# Patient Record
Sex: FEMALE | Race: BLACK OR AFRICAN AMERICAN | NOT HISPANIC OR LATINO | URBAN - METROPOLITAN AREA
[De-identification: names, ages, dates, MRNs, and addresses within clinical notes are randomized per-mention and may not be internally consistent; named-entity substitution may affect disease eponyms.]

---

## 2017-06-20 ENCOUNTER — INPATIENT (INPATIENT)
Facility: HOSPITAL | Age: 72
LOS: 4 days | Discharge: HOME | End: 2017-06-25
Attending: INTERNAL MEDICINE | Admitting: INTERNAL MEDICINE
Payer: MEDICARE

## 2017-06-20 VITALS
OXYGEN SATURATION: 90 % | RESPIRATION RATE: 17 BRPM | WEIGHT: 117.07 LBS | DIASTOLIC BLOOD PRESSURE: 61 MMHG | TEMPERATURE: 101 F | HEIGHT: 60 IN | HEART RATE: 94 BPM | SYSTOLIC BLOOD PRESSURE: 148 MMHG

## 2017-06-20 LAB
ALBUMIN SERPL ELPH-MCNC: 3.6 G/DL — SIGNIFICANT CHANGE UP (ref 3.3–5)
ALP SERPL-CCNC: 56 U/L — SIGNIFICANT CHANGE UP (ref 40–120)
ALT FLD-CCNC: 26 U/L — SIGNIFICANT CHANGE UP (ref 12–78)
ANION GAP SERPL CALC-SCNC: 12 MMOL/L — SIGNIFICANT CHANGE UP (ref 5–17)
APPEARANCE UR: ABNORMAL
AST SERPL-CCNC: 44 U/L — HIGH (ref 15–37)
BACTERIA # UR AUTO: ABNORMAL
BASOPHILS # BLD AUTO: 0.1 K/UL — SIGNIFICANT CHANGE UP (ref 0–0.2)
BASOPHILS NFR BLD AUTO: 1.2 % — SIGNIFICANT CHANGE UP (ref 0–2)
BILIRUB SERPL-MCNC: 1 MG/DL — SIGNIFICANT CHANGE UP (ref 0.2–1.2)
BILIRUB UR-MCNC: NEGATIVE — SIGNIFICANT CHANGE UP
BUN SERPL-MCNC: 14 MG/DL — SIGNIFICANT CHANGE UP (ref 7–23)
CALCIUM SERPL-MCNC: 8.9 MG/DL — SIGNIFICANT CHANGE UP (ref 8.5–10.1)
CHLORIDE SERPL-SCNC: 94 MMOL/L — LOW (ref 96–108)
CO2 SERPL-SCNC: 28 MMOL/L — SIGNIFICANT CHANGE UP (ref 22–31)
COLOR SPEC: YELLOW — SIGNIFICANT CHANGE UP
CREAT SERPL-MCNC: 0.98 MG/DL — SIGNIFICANT CHANGE UP (ref 0.5–1.3)
DIFF PNL FLD: ABNORMAL
EOSINOPHIL # BLD AUTO: 0 K/UL — SIGNIFICANT CHANGE UP (ref 0–0.5)
EOSINOPHIL NFR BLD AUTO: 0 % — SIGNIFICANT CHANGE UP (ref 0–6)
EPI CELLS # UR: SIGNIFICANT CHANGE UP
GLUCOSE SERPL-MCNC: 109 MG/DL — HIGH (ref 70–99)
GLUCOSE UR QL: NEGATIVE MG/DL — SIGNIFICANT CHANGE UP
HCT VFR BLD CALC: 36.7 % — SIGNIFICANT CHANGE UP (ref 34.5–45)
HGB BLD-MCNC: 12.5 G/DL — SIGNIFICANT CHANGE UP (ref 11.5–15.5)
KETONES UR-MCNC: ABNORMAL
LACTATE SERPL-SCNC: 1.6 MMOL/L — SIGNIFICANT CHANGE UP (ref 0.7–2)
LACTATE SERPL-SCNC: 3 MMOL/L — HIGH (ref 0.7–2)
LEUKOCYTE ESTERASE UR-ACNC: ABNORMAL
LYMPHOCYTES # BLD AUTO: 0.8 K/UL — LOW (ref 1–3.3)
LYMPHOCYTES # BLD AUTO: 10.5 % — LOW (ref 13–44)
MCHC RBC-ENTMCNC: 30.3 PG — SIGNIFICANT CHANGE UP (ref 27–34)
MCHC RBC-ENTMCNC: 34.2 GM/DL — SIGNIFICANT CHANGE UP (ref 32–36)
MCV RBC AUTO: 88.6 FL — SIGNIFICANT CHANGE UP (ref 80–100)
MONOCYTES # BLD AUTO: 0.9 K/UL — SIGNIFICANT CHANGE UP (ref 0–0.9)
MONOCYTES NFR BLD AUTO: 11.4 % — SIGNIFICANT CHANGE UP (ref 2–14)
NEUTROPHILS # BLD AUTO: 6.2 K/UL — SIGNIFICANT CHANGE UP (ref 1.8–7.4)
NEUTROPHILS NFR BLD AUTO: 76.9 % — SIGNIFICANT CHANGE UP (ref 43–77)
NITRITE UR-MCNC: NEGATIVE — SIGNIFICANT CHANGE UP
PH UR: 5 — SIGNIFICANT CHANGE UP (ref 5–8)
PLATELET # BLD AUTO: 225 K/UL — SIGNIFICANT CHANGE UP (ref 150–400)
POTASSIUM SERPL-MCNC: 3.2 MMOL/L — LOW (ref 3.5–5.3)
POTASSIUM SERPL-SCNC: 3.2 MMOL/L — LOW (ref 3.5–5.3)
PROT SERPL-MCNC: 7.9 GM/DL — SIGNIFICANT CHANGE UP (ref 6–8.3)
PROT UR-MCNC: 100 MG/DL
RBC # BLD: 4.14 M/UL — SIGNIFICANT CHANGE UP (ref 3.8–5.2)
RBC # FLD: 13.2 % — SIGNIFICANT CHANGE UP (ref 11–15)
RBC CASTS # UR COMP ASSIST: SIGNIFICANT CHANGE UP /HPF (ref 0–4)
SODIUM SERPL-SCNC: 134 MMOL/L — LOW (ref 135–145)
SP GR SPEC: 1.02 — SIGNIFICANT CHANGE UP (ref 1.01–1.02)
TROPONIN I SERPL-MCNC: 0.06 NG/ML — HIGH (ref 0.01–0.04)
UROBILINOGEN FLD QL: 1 MG/DL
WBC # BLD: 8 K/UL — SIGNIFICANT CHANGE UP (ref 3.8–10.5)
WBC # FLD AUTO: 8 K/UL — SIGNIFICANT CHANGE UP (ref 3.8–10.5)
WBC UR QL: SIGNIFICANT CHANGE UP

## 2017-06-20 PROCEDURE — 99285 EMERGENCY DEPT VISIT HI MDM: CPT

## 2017-06-20 PROCEDURE — 71010: CPT | Mod: 26

## 2017-06-20 RX ORDER — SODIUM CHLORIDE 9 MG/ML
500 INJECTION INTRAMUSCULAR; INTRAVENOUS; SUBCUTANEOUS ONCE
Qty: 0 | Refills: 0 | Status: COMPLETED | OUTPATIENT
Start: 2017-06-20 | End: 2017-06-20

## 2017-06-20 RX ORDER — IPRATROPIUM/ALBUTEROL SULFATE 18-103MCG
3 AEROSOL WITH ADAPTER (GRAM) INHALATION EVERY 6 HOURS
Qty: 0 | Refills: 0 | Status: DISCONTINUED | OUTPATIENT
Start: 2017-06-20 | End: 2017-06-25

## 2017-06-20 RX ORDER — CEFTRIAXONE 500 MG/1
1 INJECTION, POWDER, FOR SOLUTION INTRAMUSCULAR; INTRAVENOUS ONCE
Qty: 0 | Refills: 0 | Status: COMPLETED | OUTPATIENT
Start: 2017-06-20 | End: 2017-06-20

## 2017-06-20 RX ORDER — AZITHROMYCIN 500 MG/1
500 TABLET, FILM COATED ORAL ONCE
Qty: 0 | Refills: 0 | Status: COMPLETED | OUTPATIENT
Start: 2017-06-20 | End: 2017-06-20

## 2017-06-20 RX ORDER — POTASSIUM CHLORIDE 20 MEQ
10 PACKET (EA) ORAL
Qty: 0 | Refills: 0 | Status: COMPLETED | OUTPATIENT
Start: 2017-06-20 | End: 2017-06-20

## 2017-06-20 RX ORDER — ENOXAPARIN SODIUM 100 MG/ML
40 INJECTION SUBCUTANEOUS EVERY 24 HOURS
Qty: 0 | Refills: 0 | Status: DISCONTINUED | OUTPATIENT
Start: 2017-06-20 | End: 2017-06-25

## 2017-06-20 RX ORDER — ACETAMINOPHEN 500 MG
975 TABLET ORAL ONCE
Qty: 0 | Refills: 0 | Status: COMPLETED | OUTPATIENT
Start: 2017-06-20 | End: 2017-06-20

## 2017-06-20 RX ORDER — CEFTRIAXONE 500 MG/1
1 INJECTION, POWDER, FOR SOLUTION INTRAMUSCULAR; INTRAVENOUS EVERY 24 HOURS
Qty: 0 | Refills: 0 | Status: DISCONTINUED | OUTPATIENT
Start: 2017-06-21 | End: 2017-06-25

## 2017-06-20 RX ORDER — AZITHROMYCIN 500 MG/1
500 TABLET, FILM COATED ORAL EVERY 24 HOURS
Qty: 0 | Refills: 0 | Status: DISCONTINUED | OUTPATIENT
Start: 2017-06-20 | End: 2017-06-25

## 2017-06-20 RX ORDER — IPRATROPIUM/ALBUTEROL SULFATE 18-103MCG
3 AEROSOL WITH ADAPTER (GRAM) INHALATION ONCE
Qty: 0 | Refills: 0 | Status: COMPLETED | OUTPATIENT
Start: 2017-06-20 | End: 2017-06-20

## 2017-06-20 RX ORDER — CEFTRIAXONE 500 MG/1
INJECTION, POWDER, FOR SOLUTION INTRAMUSCULAR; INTRAVENOUS
Qty: 0 | Refills: 0 | Status: DISCONTINUED | OUTPATIENT
Start: 2017-06-20 | End: 2017-06-25

## 2017-06-20 RX ORDER — ASPIRIN/CALCIUM CARB/MAGNESIUM 324 MG
325 TABLET ORAL ONCE
Qty: 0 | Refills: 0 | Status: COMPLETED | OUTPATIENT
Start: 2017-06-20 | End: 2017-06-20

## 2017-06-20 RX ADMIN — Medication 100 MILLIEQUIVALENT(S): at 20:48

## 2017-06-20 RX ADMIN — Medication 100 MILLIEQUIVALENT(S): at 18:30

## 2017-06-20 RX ADMIN — ENOXAPARIN SODIUM 40 MILLIGRAM(S): 100 INJECTION SUBCUTANEOUS at 21:47

## 2017-06-20 RX ADMIN — Medication 3 MILLILITER(S): at 13:32

## 2017-06-20 RX ADMIN — Medication 325 MILLIGRAM(S): at 15:32

## 2017-06-20 RX ADMIN — Medication 975 MILLIGRAM(S): at 15:32

## 2017-06-20 RX ADMIN — AZITHROMYCIN 255 MILLIGRAM(S): 500 TABLET, FILM COATED ORAL at 16:09

## 2017-06-20 RX ADMIN — CEFTRIAXONE 100 GRAM(S): 500 INJECTION, POWDER, FOR SOLUTION INTRAMUSCULAR; INTRAVENOUS at 15:32

## 2017-06-20 RX ADMIN — Medication 3 MILLILITER(S): at 23:52

## 2017-06-20 RX ADMIN — SODIUM CHLORIDE 500 MILLILITER(S): 9 INJECTION INTRAMUSCULAR; INTRAVENOUS; SUBCUTANEOUS at 14:45

## 2017-06-20 RX ADMIN — SODIUM CHLORIDE 500 MILLILITER(S): 9 INJECTION INTRAMUSCULAR; INTRAVENOUS; SUBCUTANEOUS at 13:43

## 2017-06-20 RX ADMIN — Medication 100 MILLIEQUIVALENT(S): at 18:26

## 2017-06-20 NOTE — CONSULT NOTE ADULT - SUBJECTIVE AND OBJECTIVE BOX
Patient is a 72y old  Female who presents with a chief complaint of sob, cough.    HPI: 72 year female with Colon Cancer S/P Surgery with Colostomy in . Started with Cold about 4 days ago, Reports having productive cough, yellow Phlegm, poor appetite and SOB. Also reports having high fever up to 103 and poor appetite. At times it is difficult to bring up phlegm.  She is active smoker since age of 16.    PAST MEDICAL & SURGICAL HISTORY:  Rectal cancer  Port catheter in place: infusaport.  Chemotherapy follow-up examination: infusa port  S/P colostomy: for rectal ca. in 2/10/2012.  S/P colectomy: after colonoscopy in 10/4/2011.  S/P colonoscopy: .    FAMILY HISTORY: not contributory.    SOCIAL HISTORY: BMI (kg/m2): 22.9 . Active smoker since age 16.    Allergies  No Known Allergies    MEDICATIONS  (STANDING):  enoxaparin Injectable 40milliGRAM(s) Sub Cutaneous every 24 hours  potassium chloride  10 mEq/100 mL IVPB 10milliEquivalent(s) IV Intermittent every 1 hour    REVIEW OF SYSTEMS:    Constitutional:            fever, poor appeite  HEENT:                     No difficulty hearing, tinnitus, vertigo; No sinus or throat pain  Respiratory:               SOB and productive cough.  Cardiovascular:           No chest pain, palpitations  Gastrointestinal:        No abdominal or epigastric pain. No N/V/diarrhea or hematemesis  Genitourinary:            No dysuria, frequency, hematuria or incontinence  SKIN:                             no rash  Musculoskeletal:        No joint pain or swelling  Extremities:                No swelling  Neurological:              No headaches  Psychiatric:                 No depression, anxiety    Vital Signs Last 24 Hrs  T(C): 37, Max: 38.1 (- @ 12:24)  T(F): 98.6, Max: 100.5 (06-20 @ 12:24)  HR: 90 (90 - 94)  BP: 150/55 (148/61 - 150/55)  BP(mean): --  RR: 16 (16 - 17)  SpO2: 97% (90% - 97%)    PHYSICAL EXAM:  GEN:        Awake, responsive and comfortable.  HEENT:    Normal.    RESP:      no wheezing.  CVS:         Regular rate and rhythm.   ABD:         Colostomy  :             No costovertebral angle tenderness  SKIN:           Warm and dry.  EXTR:            No clubbing, cyanosis or edema  CNS:              Intact sensory and motor function.  PSYCH:        cooperative, no anxiety or depression    LABS:                        12.5   8.0   )-----------( 225      ( 2017 14:11 )             36.7     -    134<L>  |  94<L>  |  14  ----------------------------<  109<H>  3.2<L>   |  28  |  0.98    Ca    8.9      2017 14:11    TPro  7.9  /  Alb  3.6  /  TBili  1.0  /  DBili  x   /  AST  44<H>  /  ALT  26  /  AlkPhos  56          Urinalysis Basic - ( 2017 14:11 )    Color: Yellow / Appearance: Slightly Turbid / S.025 / pH: x  Gluc: x / Ketone: Small  / Bili: Negative / Urobili: 1 mg/dL   Blood: x / Protein: 100 mg/dL / Nitrite: Negative   Leuk Esterase: Moderate / RBC: 0-2 /HPF / WBC 3-5   Sq Epi: x / Non Sq Epi: Few / Bacteria: Few    EKG: sinus rhythm    RADIOLOGY & ADDITIONAL STUDIES:    EXAM:  CHEST SINGLE VIEW                            PROCEDURE DATE:  2017        INTERPRETATION:  cough    A frontal chest film demonstrates underlying interstitial lung disease   and chronic fibrotic change. No consolidation is seen.    The heart size and vascular markings are within normal limits for   technique.      No mediastinal or hilar adenopathy is suggested. .     The osseous structures appear intact intact.     IMPRESSION:  Interstitial lung disease throughout both lung fields.No consolidation   or pneumothorax.    RANDY RAO M.D., ATTENDING RADIOLOGIST  This document has been electronically signed. 2017  2:49PM      ASSESSMENT AND PLAN:  ·	SOB.  ·	Acute COPD exacerbation.  ·	Tobacco abuse.  ·	Suspect Pneumonia.  ·	Hypokalemia.  ·	Colon CA S/P Colostomy.    Admit to medical Floor.  IV antibiotics.  Nebulizer.  DVT prophylaxis.

## 2017-06-20 NOTE — ED PROVIDER NOTE - PRESENTING SYMPTOMS DETAILS
72F colorectal cancer s/p resection 2014 w a colostomy bag comes in w 4 days worrth of productive yellow cough, mild sob, fever. no cp, feels like she can't cough up the phlegm well. no n/v/d/abdpain  ROS: No fever/chills, No photophobia/eye pain/changes in vision, No ear pain/sore throat/dysphagia, No chest pain/palpitations, no wheeze/stridor, No abdominal pain/N/V/D, no dysuria/frequency/discharge, No neck/back pain, no rash, no changes in neurological status/function.

## 2017-06-20 NOTE — ED PROVIDER NOTE - CARE PLAN
Principal Discharge DX:	Pneumonia due to infectious organism, unspecified laterality, unspecified part of lung  Secondary Diagnosis:	Urinary tract infection without hematuria, site unspecified

## 2017-06-20 NOTE — ED ADULT NURSE NOTE - PSH
Chemotherapy follow-up examination  infusa port  Port catheter in place  infusaport.  S/P colectomy  after colonoscopy in 10/4/2011.  S/P colonoscopy  2011.  S/P colostomy  for rectal ca. in 2/10/2012.

## 2017-06-20 NOTE — ED ADULT NURSE REASSESSMENT NOTE - NS ED NURSE REASSESS COMMENT FT1
endorsed to [facility] next Krider given to her to send with pt awaiting transport
no c/o 2nd set orders pending
repeat lactate drawn and sent pt assisted to bedpan. colostomy minimal drainage no c/o

## 2017-06-20 NOTE — ED ADULT TRIAGE NOTE - CHIEF COMPLAINT QUOTE
SOB  , onset yesterday , cough , productive , yellowish and thick , fever 102  at home 2 hours ago, unsteady  gait

## 2017-06-20 NOTE — ED PROVIDER NOTE - PHYSICAL EXAMINATION
GEN: Alert, NAD  HEAD: atraumatic, EOMI, PERRL, normal lids/conjunctiva  ENT: normal hearing, patent oropharynx without erythema/exudate, uvula midline  NECK: +supple, no tenderness/meningismus, +Trachea midline  PULM: Bilateral BS, mild sob w mild rhonchi, no wheeze/stridor/retractions  CV: RRR, no M/R/G, +dist ext pulses  ABD: soft, NT/ND  BACK: no CVAT, no midline tenderness  MSK: no edema/erythema/cyanosis  SKIN: no rash  NEURO: AAOx3, no sensory/motor deficits, CN 2-12 intact

## 2017-06-21 DIAGNOSIS — J18.9 PNEUMONIA, UNSPECIFIED ORGANISM: ICD-10-CM

## 2017-06-21 DIAGNOSIS — C20 MALIGNANT NEOPLASM OF RECTUM: ICD-10-CM

## 2017-06-21 LAB
ALBUMIN SERPL ELPH-MCNC: 2.8 G/DL — LOW (ref 3.3–5)
ALP SERPL-CCNC: 43 U/L — SIGNIFICANT CHANGE UP (ref 40–120)
ALT FLD-CCNC: 20 U/L — SIGNIFICANT CHANGE UP (ref 12–78)
ANION GAP SERPL CALC-SCNC: 7 MMOL/L — SIGNIFICANT CHANGE UP (ref 5–17)
AST SERPL-CCNC: 43 U/L — HIGH (ref 15–37)
BASOPHILS # BLD AUTO: 0.1 K/UL — SIGNIFICANT CHANGE UP (ref 0–0.2)
BASOPHILS NFR BLD AUTO: 1.4 % — SIGNIFICANT CHANGE UP (ref 0–2)
BILIRUB SERPL-MCNC: 0.7 MG/DL — SIGNIFICANT CHANGE UP (ref 0.2–1.2)
BUN SERPL-MCNC: 8 MG/DL — SIGNIFICANT CHANGE UP (ref 7–23)
CALCIUM SERPL-MCNC: 8.6 MG/DL — SIGNIFICANT CHANGE UP (ref 8.5–10.1)
CHLORIDE SERPL-SCNC: 101 MMOL/L — SIGNIFICANT CHANGE UP (ref 96–108)
CO2 SERPL-SCNC: 30 MMOL/L — SIGNIFICANT CHANGE UP (ref 22–31)
CREAT SERPL-MCNC: 0.64 MG/DL — SIGNIFICANT CHANGE UP (ref 0.5–1.3)
CULTURE RESULTS: SIGNIFICANT CHANGE UP
EOSINOPHIL # BLD AUTO: 0 K/UL — SIGNIFICANT CHANGE UP (ref 0–0.5)
EOSINOPHIL NFR BLD AUTO: 0.1 % — SIGNIFICANT CHANGE UP (ref 0–6)
GLUCOSE SERPL-MCNC: 133 MG/DL — HIGH (ref 70–99)
HCT VFR BLD CALC: 31.6 % — LOW (ref 34.5–45)
HGB BLD-MCNC: 11 G/DL — LOW (ref 11.5–15.5)
LYMPHOCYTES # BLD AUTO: 0.5 K/UL — LOW (ref 1–3.3)
LYMPHOCYTES # BLD AUTO: 7 % — LOW (ref 13–44)
MAGNESIUM SERPL-MCNC: 1.9 MG/DL — SIGNIFICANT CHANGE UP (ref 1.6–2.6)
MCHC RBC-ENTMCNC: 30 PG — SIGNIFICANT CHANGE UP (ref 27–34)
MCHC RBC-ENTMCNC: 34.7 GM/DL — SIGNIFICANT CHANGE UP (ref 32–36)
MCV RBC AUTO: 86.7 FL — SIGNIFICANT CHANGE UP (ref 80–100)
MONOCYTES # BLD AUTO: 0.7 K/UL — SIGNIFICANT CHANGE UP (ref 0–0.9)
MONOCYTES NFR BLD AUTO: 10 % — SIGNIFICANT CHANGE UP (ref 2–14)
NEUTROPHILS # BLD AUTO: 5.7 K/UL — SIGNIFICANT CHANGE UP (ref 1.8–7.4)
NEUTROPHILS NFR BLD AUTO: 81.5 % — HIGH (ref 43–77)
PLATELET # BLD AUTO: 202 K/UL — SIGNIFICANT CHANGE UP (ref 150–400)
POTASSIUM SERPL-MCNC: 3.2 MMOL/L — LOW (ref 3.5–5.3)
POTASSIUM SERPL-SCNC: 3.2 MMOL/L — LOW (ref 3.5–5.3)
PROCALCITONIN SERPL-MCNC: 1.75 NG/ML — HIGH (ref 0–0.04)
PROT SERPL-MCNC: 6.5 GM/DL — SIGNIFICANT CHANGE UP (ref 6–8.3)
RBC # BLD: 3.65 M/UL — LOW (ref 3.8–5.2)
RBC # FLD: 13.7 % — SIGNIFICANT CHANGE UP (ref 11–15)
SODIUM SERPL-SCNC: 138 MMOL/L — SIGNIFICANT CHANGE UP (ref 135–145)
SPECIMEN SOURCE: SIGNIFICANT CHANGE UP
WBC # BLD: 7.1 K/UL — SIGNIFICANT CHANGE UP (ref 3.8–10.5)
WBC # FLD AUTO: 7.1 K/UL — SIGNIFICANT CHANGE UP (ref 3.8–10.5)

## 2017-06-21 RX ORDER — ACETAMINOPHEN 500 MG
650 TABLET ORAL EVERY 6 HOURS
Qty: 0 | Refills: 0 | Status: DISCONTINUED | OUTPATIENT
Start: 2017-06-21 | End: 2017-06-25

## 2017-06-21 RX ORDER — POTASSIUM CHLORIDE 20 MEQ
40 PACKET (EA) ORAL EVERY 4 HOURS
Qty: 0 | Refills: 0 | Status: COMPLETED | OUTPATIENT
Start: 2017-06-21 | End: 2017-06-21

## 2017-06-21 RX ADMIN — Medication 3 MILLILITER(S): at 05:50

## 2017-06-21 RX ADMIN — Medication 650 MILLIGRAM(S): at 01:00

## 2017-06-21 RX ADMIN — ENOXAPARIN SODIUM 40 MILLIGRAM(S): 100 INJECTION SUBCUTANEOUS at 22:09

## 2017-06-21 RX ADMIN — Medication 3 MILLILITER(S): at 17:11

## 2017-06-21 RX ADMIN — Medication 100 MILLIGRAM(S): at 18:33

## 2017-06-21 RX ADMIN — Medication 3 MILLILITER(S): at 23:53

## 2017-06-21 RX ADMIN — CEFTRIAXONE 100 GRAM(S): 500 INJECTION, POWDER, FOR SOLUTION INTRAMUSCULAR; INTRAVENOUS at 18:33

## 2017-06-21 RX ADMIN — AZITHROMYCIN 255 MILLIGRAM(S): 500 TABLET, FILM COATED ORAL at 16:02

## 2017-06-21 RX ADMIN — Medication 100 MILLIGRAM(S): at 12:51

## 2017-06-21 RX ADMIN — Medication 40 MILLIEQUIVALENT(S): at 18:32

## 2017-06-21 RX ADMIN — Medication 40 MILLIEQUIVALENT(S): at 12:57

## 2017-06-21 RX ADMIN — Medication 650 MILLIGRAM(S): at 12:54

## 2017-06-21 RX ADMIN — Medication 3 MILLILITER(S): at 11:01

## 2017-06-21 NOTE — H&P ADULT - HISTORY OF PRESENT ILLNESS
HPI: 72F colorectal cancer s/p resection 2014 w a colostomy bag comes in w 4 days worth of productive yellow cough, mild sob, fever. no cp, feels like she can't cough up the phlegm well. no n/v/d/abdpain.S/P chemotherapy for Ca colon.

## 2017-06-21 NOTE — H&P ADULT - ASSESSMENT
72F colorectal cancer s/p resection 2014 w a colostomy bag comes in w 4 days worth of productive yellow cough, mild sob, fever. no cp, feels like she can't cough up the phlegm well. no n/v/d/abdpain.S/P chemotherapy for Ca colon. Started on IV Rocephin and Azithromycin. Pulmonary consult noted.

## 2017-06-21 NOTE — H&P ADULT - NSHPREVIEWOFSYSTEMS_GEN_ALL_CORE
CONSTITUTIONAL: Mild fever, No weight loss, or fatigue  EYES: No eye pain, visual disturbances, or discharge  ENMT:  No difficulty hearing, tinnitus, vertigo; No sinus or throat pain  NECK: No pain or stiffness  BREASTS: No pain, masses, or nipple discharge  RESPIRATORY: Cough and wheezing, No chills or hemoptysis; Shortness of breath on exertion.  CARDIOVASCULAR: No chest pain, palpitations, dizziness, or leg swelling  GASTROINTESTINAL: No abdominal or epigastric pain. No nausea, vomiting, or hematemesis; No diarrhea or constipation. No melena or hematochezia.  GENITOURINARY: No dysuria, frequency, hematuria, or incontinence  NEUROLOGICAL: No headaches, memory loss, loss of strength, numbness, or tremors  SKIN: No itching, burning, rashes, or lesions   LYMPH NODES: No enlarged glands  ENDOCRINE: No heat or cold intolerance; No hair loss  MUSCULOSKELETAL: No joint pain or swelling; No muscle, back, or extremity pain  PSYCHIATRIC: No depression, anxiety, mood swings, or difficulty sleeping  HEME/LYMPH: No easy bruising, or bleeding gums  ALLERGY AND IMMUNOLOGIC: No hives or eczema

## 2017-06-21 NOTE — H&P ADULT - NSHPPHYSICALEXAM_GEN_ALL_CORE
GENERAL: NAD, well-groomed, Sparse built  HEAD:  Atraumatic, Normocephalic  EYES: EOMI, PERRLA, conjunctiva and sclera clear  ENMT:  Moist mucous membranes, Good dentition, No lesions  NECK: Supple, No JVD, Normal thyroid  NERVOUS SYSTEM:  Alert & Oriented X3, Good concentration; Motor Strength 5/5 B/L upper and lower extremities; DTRs 2+ intact and symmetric  CHEST/LUNG: Clear to percussion bilaterally; Bilateral Rales and rhonchi. Air flow equal bilaterally.  HEART: Regular rate and rhythm; No murmurs, rubs, or gallops  ABDOMEN: Soft, Nontender, Nondistended; Bowel sounds present. Colostomy in place, Functional.  EXTREMITIES:  2+ Peripheral Pulses, No clubbing, cyanosis, or edema  LYMPH: No lymphadenopathy noted  SKIN: No rashes or lesions

## 2017-06-22 LAB
GRAM STN FLD: SIGNIFICANT CHANGE UP
SPECIMEN SOURCE: SIGNIFICANT CHANGE UP

## 2017-06-22 PROCEDURE — 71250 CT THORAX DX C-: CPT | Mod: 26

## 2017-06-22 RX ADMIN — Medication 3 MILLILITER(S): at 18:51

## 2017-06-22 RX ADMIN — Medication 3 MILLILITER(S): at 23:16

## 2017-06-22 RX ADMIN — Medication 100 MILLIGRAM(S): at 21:53

## 2017-06-22 RX ADMIN — ENOXAPARIN SODIUM 40 MILLIGRAM(S): 100 INJECTION SUBCUTANEOUS at 21:53

## 2017-06-22 RX ADMIN — AZITHROMYCIN 255 MILLIGRAM(S): 500 TABLET, FILM COATED ORAL at 16:23

## 2017-06-22 RX ADMIN — CEFTRIAXONE 100 GRAM(S): 500 INJECTION, POWDER, FOR SOLUTION INTRAMUSCULAR; INTRAVENOUS at 17:49

## 2017-06-22 RX ADMIN — Medication 3 MILLILITER(S): at 12:18

## 2017-06-22 RX ADMIN — Medication 100 MILLIGRAM(S): at 06:19

## 2017-06-22 RX ADMIN — Medication 3 MILLILITER(S): at 05:43

## 2017-06-22 NOTE — PHYSICAL THERAPY INITIAL EVALUATION ADULT - MODIFIED CLINICAL TEST OF SENSORY INTEGRATION IN BALANCE TEST
Barthel Index: Feeding Score 10_/10__, Bathing Score 0_/5__, Grooming Score 5_/5__, Dressing Score 10_/10__, Bowels Score _10_/10_, Bladder Score 5_/10__, Toilet Score 10_/10__, Transfers Score 10_/15__, Mobility Score _0_/15_, Stairs Score 0_/10__,     Total Score __60_/100

## 2017-06-22 NOTE — PHYSICAL THERAPY INITIAL EVALUATION ADULT - TINETTI BALANCE TEST, REHAB EVAL
Sitting Balance - 1/1 , Rises From Chair - 1/2, Attempts to rise - 2/2 , Immediate Standing Balance - 1/2,  Standing Balance - 1/2, Nudged -  1/2, Eyes Closed -1 /1,  Turning 360 Deg -  1/2, Sitting down - 2/2, Balance Score - 11/16

## 2017-06-22 NOTE — PHYSICAL THERAPY INITIAL EVALUATION ADULT - IMPAIRMENTS FOUND, PT EVAL
aerobic capacity/endurance/posture/muscle strength/gait, locomotion, and balance/ergonomics and body mechanics

## 2017-06-22 NOTE — PHYSICAL THERAPY INITIAL EVALUATION ADULT - CRITERIA FOR SKILLED THERAPEUTIC INTERVENTIONS
anticipated equipment needs at discharge/impairments found/anticipated discharge recommendation/predicted duration of therapy intervention/functional limitations in following categories/therapy frequency/risk reduction/prevention

## 2017-06-22 NOTE — PHYSICAL THERAPY INITIAL EVALUATION ADULT - TINETTI GAIT TEST, REHAB EVAL
Indication of gait -1/1,   Step Length and height -2/2,   Foot Clearance -2/2,   Step Symmetry -1 /1,  Step Continuity -1/1,   Path -1/ 2,   Trunk -1/2,   Walking Time -0/1,   Total Score - 9/12

## 2017-06-23 LAB
ANION GAP SERPL CALC-SCNC: 7 MMOL/L — SIGNIFICANT CHANGE UP (ref 5–17)
BUN SERPL-MCNC: 4 MG/DL — LOW (ref 7–23)
CALCIUM SERPL-MCNC: 9 MG/DL — SIGNIFICANT CHANGE UP (ref 8.5–10.1)
CHLORIDE SERPL-SCNC: 100 MMOL/L — SIGNIFICANT CHANGE UP (ref 96–108)
CO2 SERPL-SCNC: 30 MMOL/L — SIGNIFICANT CHANGE UP (ref 22–31)
CREAT SERPL-MCNC: 0.44 MG/DL — LOW (ref 0.5–1.3)
GLUCOSE SERPL-MCNC: 103 MG/DL — HIGH (ref 70–99)
MAGNESIUM SERPL-MCNC: 1.9 MG/DL — SIGNIFICANT CHANGE UP (ref 1.6–2.6)
POTASSIUM SERPL-MCNC: 4.4 MMOL/L — SIGNIFICANT CHANGE UP (ref 3.5–5.3)
POTASSIUM SERPL-SCNC: 4.4 MMOL/L — SIGNIFICANT CHANGE UP (ref 3.5–5.3)
SODIUM SERPL-SCNC: 137 MMOL/L — SIGNIFICANT CHANGE UP (ref 135–145)

## 2017-06-23 RX ORDER — DIPHENHYDRAMINE HCL 50 MG
25 CAPSULE ORAL ONCE
Qty: 0 | Refills: 0 | Status: COMPLETED | OUTPATIENT
Start: 2017-06-23 | End: 2017-06-23

## 2017-06-23 RX ADMIN — Medication 100 MILLIGRAM(S): at 19:00

## 2017-06-23 RX ADMIN — Medication 3 MILLILITER(S): at 05:31

## 2017-06-23 RX ADMIN — Medication 3 MILLILITER(S): at 17:19

## 2017-06-23 RX ADMIN — ENOXAPARIN SODIUM 40 MILLIGRAM(S): 100 INJECTION SUBCUTANEOUS at 22:05

## 2017-06-23 RX ADMIN — Medication 25 MILLIGRAM(S): at 22:21

## 2017-06-23 RX ADMIN — AZITHROMYCIN 255 MILLIGRAM(S): 500 TABLET, FILM COATED ORAL at 16:33

## 2017-06-23 RX ADMIN — Medication 3 MILLILITER(S): at 11:56

## 2017-06-23 RX ADMIN — CEFTRIAXONE 100 GRAM(S): 500 INJECTION, POWDER, FOR SOLUTION INTRAMUSCULAR; INTRAVENOUS at 19:00

## 2017-06-24 LAB
CULTURE RESULTS: SIGNIFICANT CHANGE UP
GRAM STN FLD: SIGNIFICANT CHANGE UP
SPECIMEN SOURCE: SIGNIFICANT CHANGE UP

## 2017-06-24 RX ORDER — ZOLPIDEM TARTRATE 10 MG/1
5 TABLET ORAL AT BEDTIME
Qty: 0 | Refills: 0 | Status: DISCONTINUED | OUTPATIENT
Start: 2017-06-24 | End: 2017-06-25

## 2017-06-24 RX ORDER — IBUPROFEN 200 MG
400 TABLET ORAL EVERY 6 HOURS
Qty: 0 | Refills: 0 | Status: DISCONTINUED | OUTPATIENT
Start: 2017-06-24 | End: 2017-06-25

## 2017-06-24 RX ADMIN — AZITHROMYCIN 255 MILLIGRAM(S): 500 TABLET, FILM COATED ORAL at 16:25

## 2017-06-24 RX ADMIN — Medication 400 MILLIGRAM(S): at 19:49

## 2017-06-24 RX ADMIN — Medication 3 MILLILITER(S): at 17:11

## 2017-06-24 RX ADMIN — Medication 400 MILLIGRAM(S): at 19:06

## 2017-06-24 RX ADMIN — ZOLPIDEM TARTRATE 5 MILLIGRAM(S): 10 TABLET ORAL at 21:32

## 2017-06-24 RX ADMIN — Medication 3 MILLILITER(S): at 00:52

## 2017-06-24 RX ADMIN — Medication 3 MILLILITER(S): at 06:23

## 2017-06-24 RX ADMIN — Medication 3 MILLILITER(S): at 23:54

## 2017-06-24 RX ADMIN — CEFTRIAXONE 100 GRAM(S): 500 INJECTION, POWDER, FOR SOLUTION INTRAMUSCULAR; INTRAVENOUS at 17:32

## 2017-06-24 RX ADMIN — Medication 3 MILLILITER(S): at 11:19

## 2017-06-24 RX ADMIN — ENOXAPARIN SODIUM 40 MILLIGRAM(S): 100 INJECTION SUBCUTANEOUS at 21:32

## 2017-06-24 RX ADMIN — Medication 100 MILLIGRAM(S): at 17:36

## 2017-06-25 VITALS
SYSTOLIC BLOOD PRESSURE: 148 MMHG | TEMPERATURE: 97 F | OXYGEN SATURATION: 93 % | RESPIRATION RATE: 16 BRPM | DIASTOLIC BLOOD PRESSURE: 75 MMHG | HEART RATE: 80 BPM

## 2017-06-25 LAB
CULTURE RESULTS: SIGNIFICANT CHANGE UP
CULTURE RESULTS: SIGNIFICANT CHANGE UP
SPECIMEN SOURCE: SIGNIFICANT CHANGE UP
SPECIMEN SOURCE: SIGNIFICANT CHANGE UP

## 2017-06-25 RX ADMIN — Medication 400 MILLIGRAM(S): at 09:55

## 2017-06-25 RX ADMIN — Medication 400 MILLIGRAM(S): at 08:58

## 2017-06-25 RX ADMIN — Medication 100 MILLIGRAM(S): at 00:43

## 2017-06-25 NOTE — DISCHARGE NOTE ADULT - PATIENT PORTAL LINK FT
“You can access the [facility] Patient Portal, offered by [facility], by registering with the following website: [email or web address]”

## 2017-06-25 NOTE — DISCHARGE NOTE ADULT - CARE PROVIDER_API CALL
Karan Hannon (DO), Family Medicine  [address] [place] [address]  Phone: [phone]  Fax: [phone]    Lacho Montenegro (SONJA), Medicine  [address] [place] [address]  Phone: [phone]  Fax: [phone]

## 2017-06-25 NOTE — PROGRESS NOTE ADULT - PROBLEM SELECTOR PROBLEM 1
Pneumonia due to infectious organism, unspecified laterality, unspecified part of lung

## 2017-06-25 NOTE — PROGRESS NOTE ADULT - SUBJECTIVE AND OBJECTIVE BOX
INTERVAL HPI:    72 year female with Colon Cancer S/P Surgery with Colostomy in 2014. Started with Cold about 4 days ago, Reports having productive cough, yellow Phlegm, poor appetite and SOB. Also reports having high fever up to 103 and poor appetite. At times it is difficult to bring up phlegm.  She is active smoker since age of 16. Admitted with COPD exacerbation and suspected Pneumonia.    OVERNIGHT EVENTS:  Feels better, no distress.    Vital Signs Last 24 Hrs  T(C): 36.7, Max: 37.7 (06-23 @ 17:38)  T(F): 98, Max: 99.8 (06-23 @ 17:38)  HR: 82 (70 - 83)  BP: 133/63 (111/52 - 147/68)  BP(mean): --  RR: 17 (17 - 18)  SpO2: 98% (94% - 100%)    PHYSICAL EXAM:  GEN:         Awake, responsive and comfortable.  HEENT:    Normal.    RESP:      no wheezing.  CVS:         Regular rate and rhythm.   ABD:         Soft, non-tender, non-distended;   :             No costovertebral angle tenderness    MEDICATIONS  (STANDING):  enoxaparin Injectable 40milliGRAM(s) SubCutaneous every 24 hours  cefTRIAXone   IVPB  IV Intermittent   azithromycin  IVPB 500milliGRAM(s) IV Intermittent every 24 hours  cefTRIAXone   IVPB 1Gram(s) IV Intermittent every 24 hours  ALBUTerol/ipratropium for Nebulization 3milliLiter(s) Nebulizer every 6 hours    MEDICATIONS  (PRN):  acetaminophen   Tablet 650milliGRAM(s) Oral every 6 hours PRN For Temp greater than 38 C (100.4 F)  guaiFENesin    Syrup 100milliGRAM(s) Oral every 6 hours PRN Cough  zolpidem 5milliGRAM(s) Oral at bedtime PRN Insomnia    LABS:    06-23    137  |  100  |  4<L>  ----------------------------<  103<H>  4.4   |  30  |  0.44<L>    Ca    9.0      23 Jun 2017 07:40  Mg     1.9     06-23    ASSESSMENT AND PLAN:  ·	SOB.  ·	Multifocal Pneumonia.  ·	Acute COPD exacerbation.  ·	Tobacco abuse.  ·	Suspect Pneumonia.  ·	Hypokalemia.  ·	Colon CA S/P Colostomy.    Continue antibiotics and nebulizer.
INTERVAL HPI:   72 year female with Colon Cancer S/P Surgery with Colostomy in 2014. Started with Cold about 4 days ago, Reports having productive cough, yellow Phlegm, poor appetite and SOB. Also reports having high fever up to 103 and poor appetite. At times it is difficult to bring up phlegm.  She is active smoker since age of 16. Admitted with COPD exacerbation and suspected Pneumonia.    OVERNIGHT EVENTS:  comfortable and better.    Vital Signs Last 24 Hrs  T(C): 37.1, Max: 37.3 (06-22 @ 17:32)  T(F): 98.8, Max: 99.2 (06-22 @ 17:32)  HR: 83 (79 - 87)  BP: 130/60 (130/60 - 148/78)  BP(mean): --  RR: 19 (17 - 19)  SpO2: 98% (96% - 100%)    PHYSICAL EXAM:  GEN:         Awake, responsive and comfortable.  HEENT:    Normal.    RESP:       no wheezing.  CVS:             Regular rate and rhythm.   ABD:         Soft, non-tender, non-distended;   :             No costovertebral angle tenderness  EXTR:            No clubbing, cyanosis or edema  CNS:              Intact sensory and motor function.    MEDICATIONS  (STANDING):  enoxaparin Injectable 40milliGRAM(s) Sub Cutaneous every 24 hours  cefTRIAXone   IVPB  IV Intermittent   azithromycin  IVPB 500milliGRAM(s) IV Intermittent every 24 hours  cefTRIAXone   IVPB 1Gram(s) IV Intermittent every 24 hours  ALBUTerol/ipratropium for Nebulization 3milliLiter(s) Nebulizer every 6 hours    MEDICATIONS  (PRN):  acetaminophen   Tablet 650milliGRAM(s) Oral every 6 hours PRN For Temp greater than 38 C (100.4 F)  guaiFENesin    Syrup 100milliGRAM(s) Oral every 6 hours PRN Cough    LABS:    06-23    137  |  100  |  4<L>  ----------------------------<  103<H>  4.4   |  30  |  0.44<L>    Ca    9.0      23 Jun 2017 07:40  Mg     1.9     06-23    ASSESSMENT AND PLAN:  ·	SOB.  ·	Multifocal Pneumonia.  ·	Acute COPD exacerbation.  ·	Tobacco abuse.  ·	Suspect Pneumonia.  ·	Hypokalemia.  ·	Colon CA S/P Colostomy.    on antibiotics.  Follow up CT chest in 6-8 weeks.
INTERVAL HPI:  72 year female with Colon Cancer S/P Surgery with Colostomy in . Started with Cold about 4 days ago, Reports having productive cough, yellow Phlegm, poor appetite and SOB. Also reports having high fever up to 103 and poor appetite. At times it is difficult to bring up phlegm.  She is active smoker since age of 16. Admitted with COPD exacerbation and suspected Pneumonia.    OVERNIGHT EVENTS:  Awake, responsive, does not want to keep O2 on.    Vital Signs Last 24 Hrs  T(C): 38.7, Max: 39 ( @ 01:07)  T(F): 101.6, Max: 102.2 ( @ 01:07)  HR: 85 (74 - 96)  BP: 141/42 (117/48 - 165/63)  BP(mean): --  RR: 16 (16 - 18)  SpO2: 94% (93% - 98%)    PHYSICAL EXAM:  GEN:         Awake, responsive and comfortable.  HEENT:    Normal.    RESP:      no crackles.  CVS:          Regular rate and rhythm.   ABD:         Soft, non-tender, non-distended;     MEDICATIONS  (STANDING):  enoxaparin Injectable 40milliGRAM(s) SubCutaneous every 24 hours  cefTRIAXone   IVPB  IV Intermittent   azithromycin  IVPB 500milliGRAM(s) IV Intermittent every 24 hours  cefTRIAXone   IVPB 1Gram(s) IV Intermittent every 24 hours  ALBUTerol/ipratropium for Nebulization 3milliLiter(s) Nebulizer every 6 hours  potassium chloride    Tablet ER 40milliEquivalent(s) Oral every 4 hours    MEDICATIONS  (PRN):  acetaminophen   Tablet 650milliGRAM(s) Oral every 6 hours PRN For Temp greater than 38 C (100.4 F)  guaiFENesin    Syrup 100milliGRAM(s) Oral every 6 hours PRN Cough    LABS:                        11.0   7.1   )-----------( 202      ( 2017 08:30 )             31.6     -    138  |  101  |  8   ----------------------------<  133<H>  3.2<L>   |  30  |  0.64    Ca    8.6      2017 08:30  Mg     1.9         TPro  6.5  /  Alb  2.8<L>  /  TBili  0.7  /  DBili  x   /  AST  43<H>  /  ALT  20  /  AlkPhos  43  06-21    Urinalysis Basic - ( 2017 14:11 )    Color: Yellow / Appearance: Slightly Turbid / S.025 / pH: x  Gluc: x / Ketone: Small  / Bili: Negative / Urobili: 1 mg/dL   Blood: x / Protein: 100 mg/dL / Nitrite: Negative   Leuk Esterase: Moderate / RBC: 0-2 /HPF / WBC 3-5   Sq Epi: x / Non Sq Epi: Few / Bacteria: Few    ASSESSMENT AND PLAN:  ·	SOB.  ·	Acute COPD exacerbation.  ·	Tobacco abuse.  ·	Suspect Pneumonia.  ·	Hypokalemia.  ·	Colon CA S/P Colostomy.    Continue to have fever, will obtain CT chest.  Sent procalcitonin.  Continue nebulizer and antibiotics.
INTERVAL HPI: 72 year female with Colon Cancer S/P Surgery with Colostomy in 2014. Started with Cold about 4 days ago, Reports having productive cough, yellow Phlegm, poor appetite and SOB. Also reports having high fever up to 103 and poor appetite. At times it is difficult to bring up phlegm.  She is active smoker since age of 16. Admitted with COPD exacerbation and suspected Pneumonia.    OVERNIGHT EVENTS:  Feels better.    Vital Signs Last 24 Hrs  T(C): 37.3, Max: 37.7 (06-22 @ 00:57)  T(F): 99.2, Max: 99.8 (06-22 @ 00:57)  HR: 82 (79 - 88)  BP: 132/69 (129/53 - 148/56)  BP(mean): --  RR: 17 (16 - 17)  SpO2: 98% (93% - 100%)    PHYSICAL EXAM:  GEN:         Awake, responsive and comfortable.  HEENT:    Normal.    RESP:      no wheezing.  CVS:          Regular rate and rhythm.   ABD:         Soft, non-tender, non-distended;     MEDICATIONS  (STANDING):  enoxaparin Injectable 40milliGRAM(s) SubCutaneous every 24 hours  cefTRIAXone   IVPB  IV Intermittent   azithromycin  IVPB 500milliGRAM(s) IV Intermittent every 24 hours  cefTRIAXone   IVPB 1Gram(s) IV Intermittent every 24 hours  ALBUTerol/ipratropium for Nebulization 3milliLiter(s) Nebulizer every 6 hours    MEDICATIONS  (PRN):  acetaminophen   Tablet 650milliGRAM(s) Oral every 6 hours PRN For Temp greater than 38 C (100.4 F)  guaiFENesin    Syrup 100milliGRAM(s) Oral every 6 hours PRN Cough    LABS:                        11.0   7.1   )-----------( 202      ( 21 Jun 2017 08:30 )             31.6     06-21    138  |  101  |  8   ----------------------------<  133<H>  3.2<L>   |  30  |  0.64    Ca    8.6      21 Jun 2017 08:30  Mg     1.9     06-21    TPro  6.5  /  Alb  2.8<L>  /  TBili  0.7  /  DBili  x   /  AST  43<H>  /  ALT  20  /  AlkPhos  43  06-21      EXAM:  CT CHEST                            PROCEDURE DATE:  06/22/2017        INTERPRETATION:  CLINICAL INFORMATION: Pneumonia    COMPARISON: No prior CT studies are available for comparison.    PROCEDURE:     Serial axial sections through the chest were obtained without   administration of nonionic intravenous contrast. Sagittal, MIP and   coronal reformats were then generated from the axial images.    FINDINGS:     LUNG AND LARGE AIRWAYS: There are patchy and groundglass opacities   throughout both lungs.  PLEURA: No pleural effusion.  VESSELS: Marked atherosclerotic changes of the aorta and branching   vessels.  HEART: Normal size. Coronary artery calcifications are present. Trace   pericardial fluid, physiologic.  MEDIASTINUM AND LESLYE: A few subcentimeter mediastinal nodes are likely   reactive.  CHEST WALL AND LOWER NECK: Within normal limits.    UPPER ABDOMEN: Calcified gallstone is present. Marked atherosclerotic   changes of the abdominal aorta.    BONES: Within normal limits.    IMPRESSION:     Findings a metal multiple pneumonia.    Additional incidental findings as above.    CHAPITO ASHBY M.D., ATTENDING RADIOLOGIST  This document has been electronically signed. Jun 22 2017  9:20AM      ASSESSMENT AND PLAN:  ·	SOB.  ·	Multifocal Pneumonia.  ·	Acute COPD exacerbation.  ·	Tobacco abuse.  ·	Suspect Pneumonia.  ·	Hypokalemia.  ·	Colon CA S/P Colostomy.    Continue antibiotics and nebulizer.
Patient is a 72y old  Female who presents with a chief complaint of Cough, SOB (21 Jun 2017 10:16)      INTERVAL HPI/OVERNIGHT EVENTS: asymptomatic    MEDICATIONS  (STANDING):  enoxaparin Injectable 40milliGRAM(s) SubCutaneous every 24 hours  cefTRIAXone   IVPB  IV Intermittent   azithromycin  IVPB 500milliGRAM(s) IV Intermittent every 24 hours  cefTRIAXone   IVPB 1Gram(s) IV Intermittent every 24 hours  ALBUTerol/ipratropium for Nebulization 3milliLiter(s) Nebulizer every 6 hours    MEDICATIONS  (PRN):  acetaminophen   Tablet 650milliGRAM(s) Oral every 6 hours PRN For Temp greater than 38 C (100.4 F)  guaiFENesin    Syrup 100milliGRAM(s) Oral every 6 hours PRN Cough  zolpidem 5milliGRAM(s) Oral at bedtime PRN Insomnia      Allergies    No Known Allergies    Intolerances        REVIEW OF SYSTEMS:  CONSTITUTIONAL: No fever, weight loss, or fatigue  EYES: No eye pain, visual disturbances, or discharge  ENMT:  No difficulty hearing, tinnitus, vertigo; No sinus or throat pain  NECK: No pain or stiffness  BREASTS: No pain, masses, or nipple discharge  RESPIRATORY: No cough, wheezing, chills or hemoptysis; No shortness of breath  CARDIOVASCULAR: No chest pain, palpitations, dizziness, or leg swelling  GASTROINTESTINAL: No abdominal or epigastric pain. No nausea, vomiting, or hematemesis; No diarrhea or constipation. No melena or hematochezia.  GENITOURINARY: No dysuria, frequency, hematuria, or incontinence  NEUROLOGICAL: No headaches, memory loss, loss of strength, numbness, or tremors  SKIN: No itching, burning, rashes, or lesions   LYMPH NODES: No enlarged glands  ENDOCRINE: No heat or cold intolerance; No hair loss  MUSCULOSKELETAL: No joint pain or swelling; No muscle, back, or extremity pain  PSYCHIATRIC: No depression, anxiety, mood swings, or difficulty sleeping  HEME/LYMPH: No easy bruising, or bleeding gums  ALLERGY AND IMMUNOLOGIC: No hives or eczema    Vital Signs Last 24 Hrs  T(C): 36.7, Max: 37.7 (06-23 @ 17:38)  T(F): 98, Max: 99.8 (06-23 @ 17:38)  HR: 82 (70 - 83)  BP: 133/63 (111/52 - 147/68)  BP(mean): --  RR: 17 (17 - 18)  SpO2: 98% (97% - 100%)    PHYSICAL EXAM:  GENERAL: NAD, well-groomed, well-developed  HEAD:  Atraumatic, Normocephalic  EYES: EOMI, PERRLA, conjunctiva and sclera clear  ENMT:  Moist mucous membranes, Good dentition, No lesions  NECK: Supple, No JVD, Normal thyroid  NERVOUS SYSTEM:  Alert & Oriented X3, Good concentration; Motor Strength 5/5 B/L upper and lower extremities; DTRs 2+ intact and symmetric  CHEST/LUNG: Clear to percussion bilaterally; No rales, rhonchi, wheezing, or rubs  HEART: Regular rate and rhythm; No murmurs, rubs, or gallops  ABDOMEN: Soft, Nontender, Nondistended; Bowel sounds present  EXTREMITIES:  2+ Peripheral Pulses, No clubbing, cyanosis, or edema  LYMPH: No lymphadenopathy noted  SKIN: No rashes or lesions    LABS:    06-23    137  |  100  |  4<L>  ----------------------------<  103<H>  4.4   |  30  |  0.44<L>    Ca    9.0      23 Jun 2017 07:40  Mg     1.9     06-23          CAPILLARY BLOOD GLUCOSE          Procalcitonin, Serum: 1.75 ng/mL (06-21 @ 14:41)        Urine Culture:  06-22 @ 08:17    --      Normal Respiratory Charity present    .Sputum Sputum cup      Blood Culture:  06-22 @ 08:17    --      Normal Respiratory Charity present    .Sputum    RADIOLOGY & ADDITIONAL TESTS:    EXAM:  CT CHEST                            PROCEDURE DATE:  06/22/2017        INTERPRETATION:  CLINICAL INFORMATION: Pneumonia    COMPARISON: No prior CT studies are available for comparison.    PROCEDURE:     Serial axial sections through the chest were obtained without   administration of nonionic intravenous contrast. Sagittal, MIP and   coronal reformats were then generated from the axial images.    FINDINGS:     LUNG AND LARGE AIRWAYS: There are patchy and groundglass opacities   throughout both lungs.  PLEURA: No pleural effusion.  VESSELS: Marked atherosclerotic changes of the aorta and branching   vessels.  HEART: Normal size. Coronary artery calcifications are present. Trace   pericardial fluid, physiologic.  MEDIASTINUM AND LESLYE: A few subcentimeter mediastinal nodes are likely   reactive.  CHEST WALL AND LOWER NECK: Within normal limits.    UPPER ABDOMEN: Calcified gallstone is present. Marked atherosclerotic   changes of the abdominal aorta.    BONES: Within normal limits.    IMPRESSION:     Findings a metal multiple pneumonia.    Additional incidental findings as above.                  CHAPITO ASHBY M.D., ATTENDING RADIOLOGIST  This document has been electronically signed. Jun 22 2017  9:20AM              Imaging Personally Reviewed:  [x ] YES  [ ] NO    Consultant(s) Notes Reviewed:  [x ] YES  [ ] NO    Care Discussed with Consultants/Other Providers [ ] YES  [ ] NO    PROBLEMS:  UNSPECIFIED PNEUMONIA/ UTI WITHOUT HEMATURIA  WEAK COUGHING CHEST CONGESTION  Pneumonia due to infectious organism, unspecified laterality, unspecified part of lung  Rectal cancer  Pneumonia due to infectious organism, unspecified laterality, unspecified part of lung      Care discussed with family,         [  ]   yes  [  ]  No    imp:    stable[ ]    unstable[  ]     improving [   ]       unchanged  [  ]                Plans:  Continue present plans  [  ]               New consult [  ]   specialty  .......               order renetta[  ]    test name.                  Discharge Planning  [  ]
Patient is a 72y old  Female who presents with a chief complaint of Cough, SOB (21 Jun 2017 10:16)      INTERVAL HPI/OVERNIGHT EVENTS: asymptomatic    MEDICATIONS  (STANDING):  enoxaparin Injectable 40milliGRAM(s) SubCutaneous every 24 hours  cefTRIAXone   IVPB  IV Intermittent   azithromycin  IVPB 500milliGRAM(s) IV Intermittent every 24 hours  cefTRIAXone   IVPB 1Gram(s) IV Intermittent every 24 hours  ALBUTerol/ipratropium for Nebulization 3milliLiter(s) Nebulizer every 6 hours    MEDICATIONS  (PRN):  acetaminophen   Tablet 650milliGRAM(s) Oral every 6 hours PRN For Temp greater than 38 C (100.4 F)  guaiFENesin    Syrup 100milliGRAM(s) Oral every 6 hours PRN Cough  zolpidem 5milliGRAM(s) Oral at bedtime PRN Insomnia  ibuprofen  Tablet 400milliGRAM(s) Oral every 6 hours PRN pain      Allergies    No Known Allergies    Intolerances        REVIEW OF SYSTEMS:  CONSTITUTIONAL: No fever, weight loss, or fatigue  EYES: No eye pain, visual disturbances, or discharge  ENMT:  No difficulty hearing, tinnitus, vertigo; No sinus or throat pain  NECK: No pain or stiffness  BREASTS: No pain, masses, or nipple discharge  RESPIRATORY: No cough, wheezing, chills or hemoptysis; No shortness of breath  CARDIOVASCULAR: No chest pain, palpitations, dizziness, or leg swelling  GASTROINTESTINAL: No abdominal or epigastric pain. No nausea, vomiting, or hematemesis; No diarrhea or constipation. No melena or hematochezia.  GENITOURINARY: No dysuria, frequency, hematuria, or incontinence  NEUROLOGICAL: No headaches, memory loss, loss of strength, numbness, or tremors  SKIN: No itching, burning, rashes, or lesions   LYMPH NODES: No enlarged glands  ENDOCRINE: No heat or cold intolerance; No hair loss  MUSCULOSKELETAL: No joint pain or swelling; No muscle, back, or extremity pain  PSYCHIATRIC: No depression, anxiety, mood swings, or difficulty sleeping  HEME/LYMPH: No easy bruising, or bleeding gums  ALLERGY AND IMMUNOLOGIC: No hives or eczema    Vital Signs Last 24 Hrs  T(C): 36.6, Max: 37.7 (06-24 @ 17:36)  T(F): 97.8, Max: 99.8 (06-24 @ 17:36)  HR: 73 (73 - 87)  BP: 159/63 (133/63 - 168/74)  BP(mean): --  RR: 16 (16 - 18)  SpO2: 94% (90% - 98%)    PHYSICAL EXAM:  GENERAL: NAD, well-groomed, Sparse built  HEAD:  Atraumatic, Normocephalic  EYES: EOMI, PERRLA, conjunctiva and sclera clear  ENMT:  Moist mucous membranes, Good dentition, No lesions  NECK: Supple, No JVD, Normal thyroid  NERVOUS SYSTEM:  Alert & Oriented X3, Good concentration; Motor Strength 5/5 B/L upper and lower extremities; DTRs 2+ intact and symmetric  CHEST/LUNG: Clear to percussion bilaterally; No rales, rhonchi, wheezing, or rubs  HEART: Regular rate and rhythm; No murmurs, rubs, or gallops  ABDOMEN: Soft, Nontender, Nondistended; Bowel sounds present. Colostomy functional, No diarrheal stool.  EXTREMITIES:  2+ Peripheral Pulses, No clubbing, cyanosis, or edema  LYMPH: No lymphadenopathy noted  SKIN: No rashes or lesions    LABS:              CAPILLARY BLOOD GLUCOSE          Procalcitonin, Serum: 1.75 ng/mL (06-21 @ 14:41)        Urine Culture:  06-22 @ 08:17    --      Normal Respiratory Charity present    .Sputum Sputum cup      Blood Culture:  06-22 @ 08:17    --      Normal Respiratory Charity present    .Sputum Sputum cup      RADIOLOGY & ADDITIONAL TESTS:    EXAM:  CT CHEST                            PROCEDURE DATE:  06/22/2017        INTERPRETATION:  CLINICAL INFORMATION: Pneumonia    COMPARISON: No prior CT studies are available for comparison.    PROCEDURE:     Serial axial sections through the chest were obtained without   administration of nonionic intravenous contrast. Sagittal, MIP and   coronal reformats were then generated from the axial images.    FINDINGS:     LUNG AND LARGE AIRWAYS: There are patchy and groundglass opacities   throughout both lungs.  PLEURA: No pleural effusion.  VESSELS: Marked atherosclerotic changes of the aorta and branching   vessels.  HEART: Normal size. Coronary artery calcifications are present. Trace   pericardial fluid, physiologic.  MEDIASTINUM AND LESLYE: A few subcentimeter mediastinal nodes are likely   reactive.  CHEST WALL AND LOWER NECK: Within normal limits.    UPPER ABDOMEN: Calcified gallstone is present. Marked atherosclerotic   changes of the abdominal aorta.    BONES: Within normal limits.    IMPRESSION:     Findings a metal multiple pneumonia.    Additional incidental findings as above.      CHAPITO ASHBY M.D., ATTENDING RADIOLOGIST  This document has been electronically signed. Jun 22 2017  9:20AM      Imaging Personally Reviewed:  [x ] YES  [ ] NO    Consultant(s) Notes Reviewed:  [x ] YES  [ ] NO    Care Discussed with Consultants/Other Providers [x ] YES  [ ] NO    PROBLEMS:  UNSPECIFIED PNEUMONIA/ UTI WITHOUT HEMATURIA  WEAK COUGHING CHEST CONGESTION  Pneumonia due to infectious organism, unspecified laterality, unspecified part of lung  Rectal cancer  Pneumonia due to infectious organism, unspecified laterality, unspecified part of lung      Care discussed with family,         [  ]   yes  [  ]  No    imp:    stable[xx ]    unstable[  ]     improving [   ]       unchanged  [  ]                Plans:  Continue present plans  [  ]               New consult [  ]   specialty  .......               order renetta[  ]    test name.                  Discharge Planning  [xx  ]
Patient is a 72y old  Female who presents with a chief complaint of Cough, SOB (21 Jun 2017 10:16)      INTERVAL HPI/OVERNIGHT EVENTS: Pt. seen on 0/22/17 and 06/23/17    MEDICATIONS  (STANDING):  enoxaparin Injectable 40milliGRAM(s) SubCutaneous every 24 hours  cefTRIAXone   IVPB  IV Intermittent   azithromycin  IVPB 500milliGRAM(s) IV Intermittent every 24 hours  cefTRIAXone   IVPB 1Gram(s) IV Intermittent every 24 hours  ALBUTerol/ipratropium for Nebulization 3milliLiter(s) Nebulizer every 6 hours    MEDICATIONS  (PRN):  acetaminophen   Tablet 650milliGRAM(s) Oral every 6 hours PRN For Temp greater than 38 C (100.4 F)  guaiFENesin    Syrup 100milliGRAM(s) Oral every 6 hours PRN Cough      Allergies    No Known Allergies    Intolerances        REVIEW OF SYSTEMS:  CONSTITUTIONAL: No fever, weight loss, or fatigue  EYES: No eye pain, visual disturbances, or discharge  ENMT:  No difficulty hearing, tinnitus, vertigo; No sinus or throat pain  NECK: No pain or stiffness  BREASTS: No pain, masses, or nipple discharge  RESPIRATORY: Mild cough, wheezing, chills or hemoptysis; No shortness of breath  CARDIOVASCULAR: No chest pain, palpitations, dizziness, or leg swelling  GASTROINTESTINAL: No abdominal or epigastric pain. No nausea, vomiting, or hematemesis; No diarrhea or constipation. No melena or hematochezia.  GENITOURINARY: No dysuria, frequency, hematuria, or incontinence  NEUROLOGICAL: No headaches, memory loss, loss of strength, numbness, or tremors  SKIN: No itching, burning, rashes, or lesions   LYMPH NODES: No enlarged glands  ENDOCRINE: No heat or cold intolerance; No hair loss  MUSCULOSKELETAL: No joint pain or swelling; No muscle, back, or extremity pain  PSYCHIATRIC: No depression, anxiety, mood swings, or difficulty sleeping  HEME/LYMPH: No easy bruising, or bleeding gums  ALLERGY AND IMMUNOLOGIC: No hives or eczema    Vital Signs Last 24 Hrs  T(C): 37.1, Max: 37.3 (06-22 @ 17:32)  T(F): 98.8, Max: 99.2 (06-22 @ 17:32)  HR: 83 (79 - 87)  BP: 130/60 (130/60 - 148/78)  BP(mean): --  RR: 19 (17 - 19)  SpO2: 98% (96% - 100%)    PHYSICAL EXAM:  GENERAL: NAD, well-groomed, well-developed  HEAD:  Atraumatic, Normocephalic  EYES: EOMI, PERRLA, conjunctiva and sclera clear  ENMT:  Moist mucous membranes, Good dentition, No lesions  NECK: Supple, No JVD, Normal thyroid  NERVOUS SYSTEM:  Alert & Oriented X3, Good concentration; Motor Strength 5/5 B/L upper and lower extremities; DTRs 2+ intact and symmetric  CHEST/LUNG: Clear to percussion bilaterally; Rales left, rhonchi, wheezing, or rubs  HEART: Regular rate and rhythm; No murmurs, rubs, or gallops  ABDOMEN: Soft, Nontender, Nondistended; Bowel sounds present. Colostomy functional.  EXTREMITIES:  2+ Peripheral Pulses, No clubbing, cyanosis, or edema  LYMPH: No lymphadenopathy noted  SKIN: No rashes or lesions    LABS:    06-23    137  |  100  |  4<L>  ----------------------------<  103<H>  4.4   |  30  |  0.44<L>    Ca    9.0      23 Jun 2017 07:40  Mg     1.9     06-23          CAPILLARY BLOOD GLUCOSE          Procalcitonin, Serum: 1.75 ng/mL (06-21 @ 14:41)        Urine Culture:  06-22 @ 08:17    --      Normal Respiratory Charity present    .Sputum Sputum cup      Blood Culture:  06-22 @ 08:17    --      Normal Respiratory Charity present    .Sputum Sputum cup      RADIOLOGY & ADDITIONAL TESTS:    Imaging Personally Reviewed:  [ ] YES  [ ] NO    Consultant(s) Notes Reviewed:  [ ] YES  [ ] NO    Care Discussed with Consultants/Other Providers [ ] YES  [ ] NO    PROBLEMS:  UNSPECIFIED PNEUMONIA/ UTI WITHOUT HEMATURIA  WEAK COUGHING CHEST CONGESTION  Pneumonia due to infectious organism, unspecified laterality, unspecified part of lung  Rectal cancer  Pneumonia due to infectious organism, unspecified laterality, unspecified part of lung      Care discussed with family,         [  ]   yes  [  ]  No    imp:    stable[ ]    unstable[  ]     improving [   ]       unchanged  [  ]                Plans:  Continue present plans  [  ]               New consult [  ]   specialty  .......               order renetta[  ]    test name.                  Discharge Planning  [  ]

## 2017-06-25 NOTE — DISCHARGE NOTE ADULT - MEDICATION SUMMARY - MEDICATIONS TO TAKE
I will START or STAY ON the medications listed below when I get home from the hospital:    amoxicillin-clavulanate 875 mg-125 mg oral tablet  -- 1 tab(s) by mouth every 12 hours  -- Indication: For Resolving Pneumonia

## 2017-06-25 NOTE — DISCHARGE NOTE ADULT - CARE PLAN
Principal Discharge DX:	Pneumonia due to infectious organism, unspecified laterality, unspecified part of lung  Goal:	Resolved. F/U with PMD and Pulmonary. Repeat CAT scan Chest in 3-4 weeks.  Instructions for follow-up, activity and diet:	Augmentin x 7 days  Secondary Diagnosis:	COPD exacerbation  Goal:	Resolved  Secondary Diagnosis:	S/P colectomy  Secondary Diagnosis:	S/P colostomy  Secondary Diagnosis:	Rectal cancer

## 2017-06-25 NOTE — DISCHARGE NOTE ADULT - PLAN OF CARE
Resolved. F/U with PMD and Pulmonary. Repeat CAT scan Chest in 3-4 weeks. Resolved Augmentin x 7 days

## 2017-06-25 NOTE — PROGRESS NOTE ADULT - ASSESSMENT
72F colorectal cancer s/p resection 2014 w a colostomy bag comes in w 4 days worth of productive yellow cough, mild sob, fever. no cp, feels like she can't cough up the phlegm well. no n/v/d/abdpain.S/P chemotherapy for Ca colon. Started on IV Rocephin and Azithromycin. Pulmonary consult noted. Continue iv Antibiotics. Clinically much improved. Pulmonary f/u noted. Colostomy has liquid stool.
72F colorectal cancer s/p resection 2014 w a colostomy bag comes in w 4 days worth of productive yellow cough, mild sob, fever. no cp, feels like she can't cough up the phlegm well. no n/v/d/abdpain.S/P chemotherapy for Ca colon. Started on IV Rocephin and Azithromycin. Pulmonary consult noted. Continue iv Antibiotics. Clinically much improved. Pulmonary f/u noted. Colostomy has liquid stool. Continue IV antibiotics.
72F colorectal cancer s/p resection 2014 w a colostomy bag comes in w 4 days worth of productive yellow cough, mild sob, fever. no cp, feels like she can't cough up the phlegm well. no n/v/d/abdpain.S/P chemotherapy for Ca colon. Started on IV Rocephin and Azithromycin. Pulmonary consult noted. Continue iv Antibiotics. Clinically much improved. Pulmonary f/u noted. Colostomy has liquid stool. Continue IV antibiotics.Pt. asynptomatic, Ambulating well. No cough or SOB. Plan: discharge home on Augmentin, F/U with PMD. Advise repeat CAT scan In 3-4 weeks.

## 2017-06-25 NOTE — DISCHARGE NOTE ADULT - HOSPITAL COURSE
72F colorectal cancer s/p resection 2014 w a colostomy bag comes in w 4 days worth of productive yellow cough, mild sob, fever. no cp, feels like she can't cough up the phlegm well. no n/v/d/abdpain.S/P chemotherapy for Ca colon. Started on IV Rocephin and Azithromycin. Pulmonary consult noted. Continue iv Antibiotics. Clinically much improved. Pulmonary f/u noted. Colostomy has liquid stool. Continue IV antibiotics.Pt. asynptomatic, Ambulating well. No cough or SOB. Plan: discharge home on Augmentin, F/U with PMD. Advise repeat CAT scan In 3-4 weeks. Discussed with the daughter.

## 2017-06-28 DIAGNOSIS — F17.200 NICOTINE DEPENDENCE, UNSPECIFIED, UNCOMPLICATED: ICD-10-CM

## 2017-06-28 DIAGNOSIS — Z92.21 PERSONAL HISTORY OF ANTINEOPLASTIC CHEMOTHERAPY: ICD-10-CM

## 2017-06-28 DIAGNOSIS — J44.0 CHRONIC OBSTRUCTIVE PULMONARY DISEASE WITH ACUTE LOWER RESPIRATORY INFECTION: ICD-10-CM

## 2017-06-28 DIAGNOSIS — E87.6 HYPOKALEMIA: ICD-10-CM

## 2017-06-28 DIAGNOSIS — J18.9 PNEUMONIA, UNSPECIFIED ORGANISM: ICD-10-CM

## 2017-06-28 DIAGNOSIS — Z85.038 PERSONAL HISTORY OF OTHER MALIGNANT NEOPLASM OF LARGE INTESTINE: ICD-10-CM

## 2017-06-28 DIAGNOSIS — J44.1 CHRONIC OBSTRUCTIVE PULMONARY DISEASE WITH (ACUTE) EXACERBATION: ICD-10-CM

## 2017-06-28 DIAGNOSIS — Z93.3 COLOSTOMY STATUS: ICD-10-CM

## 2017-06-28 DIAGNOSIS — Z90.49 ACQUIRED ABSENCE OF OTHER SPECIFIED PARTS OF DIGESTIVE TRACT: ICD-10-CM

## 2017-06-28 DIAGNOSIS — N39.0 URINARY TRACT INFECTION, SITE NOT SPECIFIED: ICD-10-CM

## 2019-01-28 ENCOUNTER — OUTPATIENT (OUTPATIENT)
Dept: OUTPATIENT SERVICES | Facility: HOSPITAL | Age: 74
LOS: 1 days | Discharge: HOME | End: 2019-01-28
Payer: MEDICARE

## 2019-01-28 VITALS
RESPIRATION RATE: 18 BRPM | WEIGHT: 130.07 LBS | TEMPERATURE: 97 F | OXYGEN SATURATION: 99 % | SYSTOLIC BLOOD PRESSURE: 161 MMHG | HEIGHT: 63 IN | HEART RATE: 70 BPM | DIASTOLIC BLOOD PRESSURE: 88 MMHG

## 2019-01-28 VITALS
RESPIRATION RATE: 18 BRPM | DIASTOLIC BLOOD PRESSURE: 102 MMHG | WEIGHT: 130.07 LBS | TEMPERATURE: 98 F | HEIGHT: 63 IN | OXYGEN SATURATION: 99 % | HEART RATE: 70 BPM | SYSTOLIC BLOOD PRESSURE: 165 MMHG

## 2019-01-28 DIAGNOSIS — R03.0 ELEVATED BLOOD-PRESSURE READING, WITHOUT DIAGNOSIS OF HYPERTENSION: ICD-10-CM

## 2019-01-28 DIAGNOSIS — Z01.812 ENCOUNTER FOR PREPROCEDURAL LABORATORY EXAMINATION: ICD-10-CM

## 2019-01-28 DIAGNOSIS — B02.23 POSTHERPETIC POLYNEUROPATHY: ICD-10-CM

## 2019-01-28 DIAGNOSIS — K63.5 POLYP OF COLON: ICD-10-CM

## 2019-01-28 DIAGNOSIS — Z01.818 ENCOUNTER FOR OTHER PREPROCEDURAL EXAMINATION: ICD-10-CM

## 2019-01-28 LAB
ALBUMIN SERPL ELPH-MCNC: 4 G/DL — SIGNIFICANT CHANGE UP (ref 3.3–5)
ALP SERPL-CCNC: 72 U/L — SIGNIFICANT CHANGE UP (ref 40–120)
ALT FLD-CCNC: 16 U/L — SIGNIFICANT CHANGE UP (ref 12–78)
ANION GAP SERPL CALC-SCNC: 6 MMOL/L — SIGNIFICANT CHANGE UP (ref 5–17)
AST SERPL-CCNC: 15 U/L — SIGNIFICANT CHANGE UP (ref 15–37)
BASOPHILS # BLD AUTO: 0.06 K/UL — SIGNIFICANT CHANGE UP (ref 0–0.2)
BASOPHILS NFR BLD AUTO: 1.3 % — SIGNIFICANT CHANGE UP (ref 0–2)
BILIRUB SERPL-MCNC: 0.3 MG/DL — SIGNIFICANT CHANGE UP (ref 0.2–1.2)
BUN SERPL-MCNC: 24 MG/DL — HIGH (ref 7–23)
CALCIUM SERPL-MCNC: 9.5 MG/DL — SIGNIFICANT CHANGE UP (ref 8.5–10.1)
CHLORIDE SERPL-SCNC: 106 MMOL/L — SIGNIFICANT CHANGE UP (ref 96–108)
CO2 SERPL-SCNC: 29 MMOL/L — SIGNIFICANT CHANGE UP (ref 22–31)
CREAT SERPL-MCNC: 1.01 MG/DL — SIGNIFICANT CHANGE UP (ref 0.5–1.3)
EOSINOPHIL # BLD AUTO: 0.11 K/UL — SIGNIFICANT CHANGE UP (ref 0–0.5)
EOSINOPHIL NFR BLD AUTO: 2.4 % — SIGNIFICANT CHANGE UP (ref 0–6)
GLUCOSE SERPL-MCNC: 97 MG/DL — SIGNIFICANT CHANGE UP (ref 70–99)
HCT VFR BLD CALC: 34.1 % — LOW (ref 34.5–45)
HGB BLD-MCNC: 10.7 G/DL — LOW (ref 11.5–15.5)
IMM GRANULOCYTES NFR BLD AUTO: 0.2 % — SIGNIFICANT CHANGE UP (ref 0–1.5)
LYMPHOCYTES # BLD AUTO: 1.43 K/UL — SIGNIFICANT CHANGE UP (ref 1–3.3)
LYMPHOCYTES # BLD AUTO: 31.1 % — SIGNIFICANT CHANGE UP (ref 13–44)
MCHC RBC-ENTMCNC: 28.9 PG — SIGNIFICANT CHANGE UP (ref 27–34)
MCHC RBC-ENTMCNC: 31.4 GM/DL — LOW (ref 32–36)
MCV RBC AUTO: 92.2 FL — SIGNIFICANT CHANGE UP (ref 80–100)
MONOCYTES # BLD AUTO: 0.6 K/UL — SIGNIFICANT CHANGE UP (ref 0–0.9)
MONOCYTES NFR BLD AUTO: 13 % — SIGNIFICANT CHANGE UP (ref 2–14)
NEUTROPHILS # BLD AUTO: 2.39 K/UL — SIGNIFICANT CHANGE UP (ref 1.8–7.4)
NEUTROPHILS NFR BLD AUTO: 52 % — SIGNIFICANT CHANGE UP (ref 43–77)
NRBC # BLD: 0 /100 WBCS — SIGNIFICANT CHANGE UP (ref 0–0)
PLATELET # BLD AUTO: 281 K/UL — SIGNIFICANT CHANGE UP (ref 150–400)
POTASSIUM SERPL-MCNC: 4.3 MMOL/L — SIGNIFICANT CHANGE UP (ref 3.5–5.3)
POTASSIUM SERPL-SCNC: 4.3 MMOL/L — SIGNIFICANT CHANGE UP (ref 3.5–5.3)
PROT SERPL-MCNC: 8.2 GM/DL — SIGNIFICANT CHANGE UP (ref 6–8.3)
RBC # BLD: 3.7 M/UL — LOW (ref 3.8–5.2)
RBC # FLD: 14.5 % — SIGNIFICANT CHANGE UP (ref 10.3–14.5)
SODIUM SERPL-SCNC: 141 MMOL/L — SIGNIFICANT CHANGE UP (ref 135–145)
WBC # BLD: 4.6 K/UL — SIGNIFICANT CHANGE UP (ref 3.8–10.5)
WBC # FLD AUTO: 4.6 K/UL — SIGNIFICANT CHANGE UP (ref 3.8–10.5)

## 2019-01-28 PROCEDURE — 93010 ELECTROCARDIOGRAM REPORT: CPT | Mod: NC

## 2019-01-28 RX ORDER — SODIUM CHLORIDE 9 MG/ML
1000 INJECTION, SOLUTION INTRAVENOUS
Qty: 0 | Refills: 0 | Status: DISCONTINUED | OUTPATIENT
Start: 2019-02-04 | End: 2019-02-19

## 2019-01-28 RX ORDER — SODIUM CHLORIDE 9 MG/ML
3 INJECTION INTRAMUSCULAR; INTRAVENOUS; SUBCUTANEOUS EVERY 8 HOURS
Qty: 0 | Refills: 0 | Status: DISCONTINUED | OUTPATIENT
Start: 2019-02-04 | End: 2019-02-19

## 2019-01-28 NOTE — H&P PST ADULT - VISION (WITH CORRECTIVE LENSES IF THE PATIENT USUALLY WEARS THEM):
wears/Partially impaired: cannot see medication labels or newsprint, but can see obstacles in path, and the surrounding layout; can count fingers at arm's length

## 2019-01-28 NOTE — H&P PST ADULT - HISTORY OF PRESENT ILLNESS
72yo female with PMH of rectal cancer, and underwent rectal surgery, colostomy placement, radiation and chemotherapy 2011. Pt presents today for presurgical testing for Colonoscopy via Ostomy scheduled for 2/4/2019. 74yo female with PMH of rectal cancer, reports she underwent rectal surgery, colostomy placement, radiation and chemotherapy in 2011. Pt presents today for presurgical testing for Colonoscopy via Ostomy scheduled for 2/4/2019.

## 2019-01-28 NOTE — H&P PST ADULT - PROBLEM SELECTOR PLAN 1
Colonoscopy via Ostomy scheduled for 2/4/2019. Colonoscopy via Ostomy scheduled for 2/4/2019.  Pre-op instructions given. Pt verbalized understanding  chlorhexidine wash instructions given  Pending: Medical clearance - EKG elevated with no h/o HTN

## 2019-01-28 NOTE — H&P PST ADULT - PSH
Chemotherapy follow-up examination  infusa port  S/P colostomy  for rectal ca. in 2/10/2012.  S/P colectomy  after colonoscopy in 10/4/2011.  S/P colonoscopy  2011.

## 2019-01-28 NOTE — H&P PST ADULT - NEGATIVE GASTROINTESTINAL SYMPTOMS
no diarrhea/no flatulence/no abdominal pain/no change in bowel habits/no nausea/no melena/no constipation/no vomiting

## 2019-01-28 NOTE — H&P PST ADULT - MUSCULOSKELETAL
negative detailed exam no joint warmth/ROM intact/normal strength/no joint swelling/no joint erythema/no calf tenderness

## 2019-02-04 ENCOUNTER — OUTPATIENT (OUTPATIENT)
Dept: OUTPATIENT SERVICES | Facility: HOSPITAL | Age: 74
LOS: 1 days | Discharge: HOME | End: 2019-02-04
Payer: MEDICARE

## 2019-02-04 ENCOUNTER — RESULT REVIEW (OUTPATIENT)
Age: 74
End: 2019-02-04

## 2019-02-04 VITALS
HEART RATE: 90 BPM | SYSTOLIC BLOOD PRESSURE: 155 MMHG | OXYGEN SATURATION: 97 % | RESPIRATION RATE: 18 BRPM | DIASTOLIC BLOOD PRESSURE: 75 MMHG | TEMPERATURE: 99 F | WEIGHT: 130.07 LBS | HEIGHT: 63 IN

## 2019-02-04 VITALS
OXYGEN SATURATION: 100 % | SYSTOLIC BLOOD PRESSURE: 181 MMHG | TEMPERATURE: 97 F | RESPIRATION RATE: 19 BRPM | HEART RATE: 98 BPM | DIASTOLIC BLOOD PRESSURE: 83 MMHG

## 2019-02-04 DIAGNOSIS — Z85.048 PERSONAL HISTORY OF OTHER MALIGNANT NEOPLASM OF RECTUM, RECTOSIGMOID JUNCTION, AND ANUS: ICD-10-CM

## 2019-02-04 DIAGNOSIS — K57.30 DIVERTICULOSIS OF LARGE INTESTINE WITHOUT PERFORATION OR ABSCESS WITHOUT BLEEDING: ICD-10-CM

## 2019-02-04 DIAGNOSIS — D12.4 BENIGN NEOPLASM OF DESCENDING COLON: ICD-10-CM

## 2019-02-04 PROCEDURE — 88305 TISSUE EXAM BY PATHOLOGIST: CPT | Mod: 26

## 2019-02-04 RX ORDER — SODIUM CHLORIDE 9 MG/ML
1000 INJECTION, SOLUTION INTRAVENOUS
Qty: 0 | Refills: 0 | Status: DISCONTINUED | OUTPATIENT
Start: 2019-02-04 | End: 2019-02-04

## 2019-02-04 NOTE — PROGRESS NOTE ADULT - SUBJECTIVE AND OBJECTIVE BOX
Procedure:           Colonoscopy    Indications:             Monitored Anesthesia Care provided by :     Prep Quality :  ____________________________________________________________________________________________________  Procedure:           Pre-Anesthesia Assessment:                       - Prior to the procedure, a History and Physical was performed, and patient                        medications and allergies were reviewed. The patient is competent. The risks                        and benefits of the procedure and the sedation options and risks were                        discussed with the patient. All questions were answered and informed consent                        was obtained. Patient identification and proposed procedure were verified by                        the physician, the nurse and the anesthesiologist in the procedure room.                        Mental Status Examination: alert and oriented. Airway Examination: normal                        oropharyngeal airway and neck mobility. Respiratory Examination: clear to                        auscultation. CV Examination: normal. Prophylactic Antibiotics: The patient                        does not require prophylactic antibiotics.                        Grade Assessment: III - A patient with severe systemic disease. After                        reviewing the risks and benefits, the patient was deemed in satisfactory                        condition to undergo the procedure. The anesthesia plan was to use monitored                        anesthesia care (MAC). Immediately prior to administration of medications,                        the patient was re-assessed for adequacy to receive sedatives. The heart                        rate, respiratory rate, oxygen saturations, blood pressure, adequacy of                        pulmonary ventilation, and response to care were monitored throughout the                        procedure. The physical status of the patient was re-assessed after the   	 	     procedure.                       After I obtained informed consent, the scope was passed under direct vision.                        Throughout the procedure, the patient's blood pressure, pulse, and oxygen                        saturations were monitored continuously. The Colonoscope was introduced                        through the OSTOMY  and advanced to theCECUM with identification of                        the appendiceal orifice and IC valve. The colonoscopy was performed without                        difficulty. The patient tolerated the procedure well. The quality of the                        bowel preparation was good.  Withdrawal Time :     17 min    PREP : FAIR    DESCENDING COLON: From Ostomy at 5cm proximally a 2CM pedunculated polyp snared and removed in piece meal fashion     TRANSVERSE COLON: SEVERE DIVERTICULOSIS     ASCENDING COLON:  SEVERE DIVERTICULOSIS     CECUM:  SEVERE DIVERTICULOSIS     TERMINAL ILEUM: not entered    The patient tolerated the procedure well.

## 2019-02-05 LAB — SURGICAL PATHOLOGY STUDY: SIGNIFICANT CHANGE UP

## 2019-02-06 DIAGNOSIS — Z12.11 ENCOUNTER FOR SCREENING FOR MALIGNANT NEOPLASM OF COLON: ICD-10-CM

## 2019-02-06 DIAGNOSIS — Z90.49 ACQUIRED ABSENCE OF OTHER SPECIFIED PARTS OF DIGESTIVE TRACT: ICD-10-CM

## 2019-02-06 DIAGNOSIS — Z87.891 PERSONAL HISTORY OF NICOTINE DEPENDENCE: ICD-10-CM

## 2019-02-06 DIAGNOSIS — Z85.048 PERSONAL HISTORY OF OTHER MALIGNANT NEOPLASM OF RECTUM, RECTOSIGMOID JUNCTION, AND ANUS: ICD-10-CM

## 2019-02-06 DIAGNOSIS — I10 ESSENTIAL (PRIMARY) HYPERTENSION: ICD-10-CM

## 2019-02-06 DIAGNOSIS — D12.6 BENIGN NEOPLASM OF COLON, UNSPECIFIED: ICD-10-CM

## 2019-02-06 DIAGNOSIS — Z93.3 COLOSTOMY STATUS: ICD-10-CM

## 2019-02-06 DIAGNOSIS — K57.30 DIVERTICULOSIS OF LARGE INTESTINE WITHOUT PERFORATION OR ABSCESS WITHOUT BLEEDING: ICD-10-CM

## 2019-02-06 DIAGNOSIS — G62.9 POLYNEUROPATHY, UNSPECIFIED: ICD-10-CM

## 2019-02-06 NOTE — H&P ADULT - NSHPLABSRESULTS_GEN_ALL_CORE
11.0   7.1   )-----------( 202      ( 2017 08:30 )             31.6     06-21    138  |  101  |  8   ----------------------------<  133<H>  3.2<L>   |  30  |  0.64    Ca    8.6      2017 08:30    TPro  6.5  /  Alb  2.8<L>  /  TBili  0.7  /  DBili  x   /  AST  43<H>  /  ALT  20  /  AlkPhos  43  06-21      Urinalysis Basic - ( 2017 14:11 )    Color: Yellow / Appearance: Slightly Turbid / S.025 / pH: x  Gluc: x / Ketone: Small  / Bili: Negative / Urobili: 1 mg/dL   Blood: x / Protein: 100 mg/dL / Nitrite: Negative   Leuk Esterase: Moderate / RBC: 0-2 /HPF / WBC 3-5   Sq Epi: x / Non Sq Epi: Few / Bacteria: Few      CAPILLARY BLOOD GLUCOSE      CARDIAC MARKERS ( 2017 14:11 )  .057 ng/mL / x     / x     / x     / x            Urine Culture:      Blood Culture:    Chest X-Ray: Bilateral chronic changes. no

## 2019-04-22 NOTE — PATIENT PROFILE ADULT. - ASSIST WITH
Subjective:       Patient ID: Fatoumata Lal 36 y.o.     Chief Complaint:  No chief complaint on file.      History of Present Illness  Pt here for wound check/staple removal.  Pt doing well with normal postop complaints.        OB History   No data available       Review of Systems  Review of Systems   Constitutional: Negative for activity change, appetite change, chills, diaphoresis and fever.   Eyes: Negative for visual disturbance.   Respiratory: Negative for shortness of breath and wheezing.    Cardiovascular: Negative for chest pain.   Gastrointestinal: Negative for blood in stool, constipation, diarrhea, nausea and vomiting.   Genitourinary: Negative for dysuria, hematuria and menorrhagia.   Neurological: Negative for headaches.   Psychiatric/Behavioral: Negative for depression. The patient is not nervous/anxious.    Breast: Negative for lump, skin changes and tenderness          Objective:    Physical Exam:   Constitutional: She appears well-developed and well-nourished. No distress.    HENT:   Head: Normocephalic and atraumatic.      Cardiovascular: Exam reveals no clubbing and no cyanosis.     Pulmonary/Chest: Effort normal. No respiratory distress.        Abdominal: Soft. She exhibits abdominal incision (clean dry and intact). She exhibits no distension. There is no rebound and no guarding.             Musculoskeletal: Normal range of motion and moves all extremeties.        Skin: Skin is warm and dry. She is not diaphoretic. No cyanosis or erythema. Nails show no clubbing.    Psychiatric: She has a normal mood and affect. Her behavior is normal.          Assessment:     SP csection 1 week  Wound check normal          Plan:      Keep wound clean and dry  Pain bleeding fever precautions given  rtc for 6 wk exam       
walking

## 2019-05-30 ENCOUNTER — INPATIENT (INPATIENT)
Facility: HOSPITAL | Age: 74
LOS: 18 days | Discharge: NURSING, REHAB OR LONG-TERM CARE | End: 2019-06-18
Attending: FAMILY MEDICINE | Admitting: FAMILY MEDICINE
Payer: MEDICARE

## 2019-05-30 VITALS — RESPIRATION RATE: 21 BRPM | HEART RATE: 106 BPM | OXYGEN SATURATION: 88 %

## 2019-05-30 LAB — GLUCOSE BLDC GLUCOMTR-MCNC: 148 MG/DL — HIGH (ref 70–99)

## 2019-05-30 PROCEDURE — 99053 MED SERV 10PM-8AM 24 HR FAC: CPT

## 2019-05-30 PROCEDURE — 70450 CT HEAD/BRAIN W/O DYE: CPT | Mod: 26

## 2019-05-30 PROCEDURE — 99282 EMERGENCY DEPT VISIT SF MDM: CPT | Mod: 25

## 2019-05-30 NOTE — ED PROVIDER NOTE - CARE PLAN
Principal Discharge DX:	CVA (cerebral vascular accident)  Secondary Diagnosis:	Elevated troponin I level

## 2019-05-30 NOTE — ED PROVIDER NOTE - OBJECTIVE STATEMENT
Pertinent PMH/PSH/FHx/SHx and Review of Systems contained within:    75yo F w PMH of rectal cancer s/p surgery w colostomy presents to ED for eval of R sided weakness.  Daughter states she spoke w pt on the phone at noon and noted her speech was not the same.  Daughter then called mult times after, pt did not  phone.  Pt found at home laying on ground, unknown down time.  Pt unable to give history.

## 2019-05-30 NOTE — ED PROVIDER NOTE - CLINICAL SUMMARY MEDICAL DECISION MAKING FREE TEXT BOX
Pt w above dx, unable to give ASA, pt s/p rectal ca & failed swallow study.  Admitted for further eval/mgmt.

## 2019-05-31 DIAGNOSIS — Z29.9 ENCOUNTER FOR PROPHYLACTIC MEASURES, UNSPECIFIED: ICD-10-CM

## 2019-05-31 DIAGNOSIS — C20 MALIGNANT NEOPLASM OF RECTUM: ICD-10-CM

## 2019-05-31 DIAGNOSIS — I63.9 CEREBRAL INFARCTION, UNSPECIFIED: ICD-10-CM

## 2019-05-31 DIAGNOSIS — R74.8 ABNORMAL LEVELS OF OTHER SERUM ENZYMES: ICD-10-CM

## 2019-05-31 PROBLEM — B02.23 POSTHERPETIC POLYNEUROPATHY: Chronic | Status: ACTIVE | Noted: 2019-01-28

## 2019-05-31 LAB
ALBUMIN SERPL ELPH-MCNC: 4.1 G/DL — SIGNIFICANT CHANGE UP (ref 3.3–5)
ALP SERPL-CCNC: 58 U/L — SIGNIFICANT CHANGE UP (ref 40–120)
ALT FLD-CCNC: 31 U/L — SIGNIFICANT CHANGE UP (ref 12–78)
ANION GAP SERPL CALC-SCNC: 11 MMOL/L — SIGNIFICANT CHANGE UP (ref 5–17)
ANION GAP SERPL CALC-SCNC: 12 MMOL/L — SIGNIFICANT CHANGE UP (ref 5–17)
APTT BLD: 26.7 SEC — LOW (ref 28.5–37)
AST SERPL-CCNC: 29 U/L — SIGNIFICANT CHANGE UP (ref 15–37)
BASOPHILS # BLD AUTO: 0.04 K/UL — SIGNIFICANT CHANGE UP (ref 0–0.2)
BASOPHILS # BLD AUTO: 0.04 K/UL — SIGNIFICANT CHANGE UP (ref 0–0.2)
BASOPHILS NFR BLD AUTO: 0.4 % — SIGNIFICANT CHANGE UP (ref 0–2)
BASOPHILS NFR BLD AUTO: 0.5 % — SIGNIFICANT CHANGE UP (ref 0–2)
BILIRUB SERPL-MCNC: 0.6 MG/DL — SIGNIFICANT CHANGE UP (ref 0.2–1.2)
BUN SERPL-MCNC: 33 MG/DL — HIGH (ref 7–23)
BUN SERPL-MCNC: 37 MG/DL — HIGH (ref 7–23)
CALCIUM SERPL-MCNC: 10.3 MG/DL — HIGH (ref 8.5–10.1)
CALCIUM SERPL-MCNC: 9.8 MG/DL — SIGNIFICANT CHANGE UP (ref 8.5–10.1)
CHLORIDE SERPL-SCNC: 102 MMOL/L — SIGNIFICANT CHANGE UP (ref 96–108)
CHLORIDE SERPL-SCNC: 104 MMOL/L — SIGNIFICANT CHANGE UP (ref 96–108)
CHOLEST SERPL-MCNC: 202 MG/DL — HIGH (ref 10–199)
CK MB BLD-MCNC: 1.1 % — SIGNIFICANT CHANGE UP (ref 0–3.5)
CK MB BLD-MCNC: 1.6 % — SIGNIFICANT CHANGE UP (ref 0–3.5)
CK MB BLD-MCNC: 1.9 % — SIGNIFICANT CHANGE UP (ref 0–3.5)
CK MB CFR SERPL CALC: 4 NG/ML — HIGH (ref 0.5–3.6)
CK MB CFR SERPL CALC: 4.1 NG/ML — HIGH (ref 0.5–3.6)
CK MB CFR SERPL CALC: 5.7 NG/ML — HIGH (ref 0.5–3.6)
CK SERPL-CCNC: 215 U/L — HIGH (ref 26–192)
CK SERPL-CCNC: 362 U/L — HIGH (ref 26–192)
CK SERPL-CCNC: 363 U/L — HIGH (ref 26–192)
CO2 SERPL-SCNC: 21 MMOL/L — LOW (ref 22–31)
CO2 SERPL-SCNC: 25 MMOL/L — SIGNIFICANT CHANGE UP (ref 22–31)
CREAT SERPL-MCNC: 1.42 MG/DL — HIGH (ref 0.5–1.3)
CREAT SERPL-MCNC: 1.91 MG/DL — HIGH (ref 0.5–1.3)
EOSINOPHIL # BLD AUTO: 0 K/UL — SIGNIFICANT CHANGE UP (ref 0–0.5)
EOSINOPHIL # BLD AUTO: 0.02 K/UL — SIGNIFICANT CHANGE UP (ref 0–0.5)
EOSINOPHIL NFR BLD AUTO: 0 % — SIGNIFICANT CHANGE UP (ref 0–6)
EOSINOPHIL NFR BLD AUTO: 0.3 % — SIGNIFICANT CHANGE UP (ref 0–6)
GLUCOSE SERPL-MCNC: 104 MG/DL — HIGH (ref 70–99)
GLUCOSE SERPL-MCNC: 135 MG/DL — HIGH (ref 70–99)
HBA1C BLD-MCNC: 5.4 % — SIGNIFICANT CHANGE UP (ref 4–5.6)
HCT VFR BLD CALC: 33 % — LOW (ref 34.5–45)
HCT VFR BLD CALC: 35 % — SIGNIFICANT CHANGE UP (ref 34.5–45)
HDLC SERPL-MCNC: 94 MG/DL — SIGNIFICANT CHANGE UP
HGB BLD-MCNC: 10.9 G/DL — LOW (ref 11.5–15.5)
HGB BLD-MCNC: 11.5 G/DL — SIGNIFICANT CHANGE UP (ref 11.5–15.5)
IMM GRANULOCYTES NFR BLD AUTO: 0.4 % — SIGNIFICANT CHANGE UP (ref 0–1.5)
IMM GRANULOCYTES NFR BLD AUTO: 0.4 % — SIGNIFICANT CHANGE UP (ref 0–1.5)
INR BLD: 1.02 RATIO — SIGNIFICANT CHANGE UP (ref 0.88–1.16)
LIPID PNL WITH DIRECT LDL SERPL: 100 MG/DL — SIGNIFICANT CHANGE UP
LYMPHOCYTES # BLD AUTO: 0.66 K/UL — LOW (ref 1–3.3)
LYMPHOCYTES # BLD AUTO: 0.92 K/UL — LOW (ref 1–3.3)
LYMPHOCYTES # BLD AUTO: 11.5 % — LOW (ref 13–44)
LYMPHOCYTES # BLD AUTO: 6.1 % — LOW (ref 13–44)
MCHC RBC-ENTMCNC: 29.5 PG — SIGNIFICANT CHANGE UP (ref 27–34)
MCHC RBC-ENTMCNC: 29.5 PG — SIGNIFICANT CHANGE UP (ref 27–34)
MCHC RBC-ENTMCNC: 32.9 GM/DL — SIGNIFICANT CHANGE UP (ref 32–36)
MCHC RBC-ENTMCNC: 33 GM/DL — SIGNIFICANT CHANGE UP (ref 32–36)
MCV RBC AUTO: 89.4 FL — SIGNIFICANT CHANGE UP (ref 80–100)
MCV RBC AUTO: 89.7 FL — SIGNIFICANT CHANGE UP (ref 80–100)
MONOCYTES # BLD AUTO: 0.74 K/UL — SIGNIFICANT CHANGE UP (ref 0–0.9)
MONOCYTES # BLD AUTO: 0.96 K/UL — HIGH (ref 0–0.9)
MONOCYTES NFR BLD AUTO: 8.8 % — SIGNIFICANT CHANGE UP (ref 2–14)
MONOCYTES NFR BLD AUTO: 9.3 % — SIGNIFICANT CHANGE UP (ref 2–14)
NEUTROPHILS # BLD AUTO: 6.25 K/UL — SIGNIFICANT CHANGE UP (ref 1.8–7.4)
NEUTROPHILS # BLD AUTO: 9.18 K/UL — HIGH (ref 1.8–7.4)
NEUTROPHILS NFR BLD AUTO: 78 % — HIGH (ref 43–77)
NEUTROPHILS NFR BLD AUTO: 84.3 % — HIGH (ref 43–77)
NRBC # BLD: 0 /100 WBCS — SIGNIFICANT CHANGE UP (ref 0–0)
NRBC # BLD: 0 /100 WBCS — SIGNIFICANT CHANGE UP (ref 0–0)
PLATELET # BLD AUTO: 223 K/UL — SIGNIFICANT CHANGE UP (ref 150–400)
PLATELET # BLD AUTO: SIGNIFICANT CHANGE UP K/UL (ref 150–400)
POTASSIUM SERPL-MCNC: 4.4 MMOL/L — SIGNIFICANT CHANGE UP (ref 3.5–5.3)
POTASSIUM SERPL-MCNC: 4.7 MMOL/L — SIGNIFICANT CHANGE UP (ref 3.5–5.3)
POTASSIUM SERPL-SCNC: 4.4 MMOL/L — SIGNIFICANT CHANGE UP (ref 3.5–5.3)
POTASSIUM SERPL-SCNC: 4.7 MMOL/L — SIGNIFICANT CHANGE UP (ref 3.5–5.3)
PROT SERPL-MCNC: 7.9 GM/DL — SIGNIFICANT CHANGE UP (ref 6–8.3)
PROTHROM AB SERPL-ACNC: 11.4 SEC — SIGNIFICANT CHANGE UP (ref 10–12.9)
RBC # BLD: 3.69 M/UL — LOW (ref 3.8–5.2)
RBC # BLD: 3.9 M/UL — SIGNIFICANT CHANGE UP (ref 3.8–5.2)
RBC # FLD: 14.9 % — HIGH (ref 10.3–14.5)
RBC # FLD: 14.9 % — HIGH (ref 10.3–14.5)
SODIUM SERPL-SCNC: 137 MMOL/L — SIGNIFICANT CHANGE UP (ref 135–145)
SODIUM SERPL-SCNC: 138 MMOL/L — SIGNIFICANT CHANGE UP (ref 135–145)
TOTAL CHOLESTEROL/HDL RATIO MEASUREMENT: 2.2 RATIO — LOW (ref 3.3–7.1)
TRIGL SERPL-MCNC: 42 MG/DL — SIGNIFICANT CHANGE UP (ref 10–149)
TROPONIN I SERPL-MCNC: 0.52 NG/ML — HIGH (ref 0.01–0.04)
TROPONIN I SERPL-MCNC: 0.54 NG/ML — HIGH (ref 0.01–0.04)
TROPONIN I SERPL-MCNC: 0.73 NG/ML — HIGH (ref 0.01–0.04)
WBC # BLD: 10.88 K/UL — HIGH (ref 3.8–10.5)
WBC # BLD: 8 K/UL — SIGNIFICANT CHANGE UP (ref 3.8–10.5)
WBC # FLD AUTO: 10.88 K/UL — HIGH (ref 3.8–10.5)
WBC # FLD AUTO: 8 K/UL — SIGNIFICANT CHANGE UP (ref 3.8–10.5)

## 2019-05-31 PROCEDURE — 72125 CT NECK SPINE W/O DYE: CPT | Mod: 26

## 2019-05-31 PROCEDURE — 71045 X-RAY EXAM CHEST 1 VIEW: CPT | Mod: 26

## 2019-05-31 PROCEDURE — 70551 MRI BRAIN STEM W/O DYE: CPT | Mod: 26

## 2019-05-31 PROCEDURE — 73090 X-RAY EXAM OF FOREARM: CPT | Mod: 26,RT

## 2019-05-31 PROCEDURE — 93880 EXTRACRANIAL BILAT STUDY: CPT | Mod: 26

## 2019-05-31 PROCEDURE — 76377 3D RENDER W/INTRP POSTPROCES: CPT | Mod: 26

## 2019-05-31 PROCEDURE — 99223 1ST HOSP IP/OBS HIGH 75: CPT

## 2019-05-31 PROCEDURE — 93010 ELECTROCARDIOGRAM REPORT: CPT

## 2019-05-31 RX ORDER — SODIUM CHLORIDE 9 MG/ML
1000 INJECTION, SOLUTION INTRAVENOUS
Refills: 0 | Status: DISCONTINUED | OUTPATIENT
Start: 2019-05-31 | End: 2019-06-18

## 2019-05-31 RX ORDER — HEPARIN SODIUM 5000 [USP'U]/ML
5000 INJECTION INTRAVENOUS; SUBCUTANEOUS EVERY 8 HOURS
Refills: 0 | Status: DISCONTINUED | OUTPATIENT
Start: 2019-05-31 | End: 2019-06-18

## 2019-05-31 RX ORDER — GLUCAGON INJECTION, SOLUTION 0.5 MG/.1ML
1 INJECTION, SOLUTION SUBCUTANEOUS ONCE
Refills: 0 | Status: DISCONTINUED | OUTPATIENT
Start: 2019-05-31 | End: 2019-06-18

## 2019-05-31 RX ORDER — GABAPENTIN 400 MG/1
1 CAPSULE ORAL
Qty: 0 | Refills: 0 | DISCHARGE

## 2019-05-31 RX ORDER — DEXTROSE 50 % IN WATER 50 %
25 SYRINGE (ML) INTRAVENOUS ONCE
Refills: 0 | Status: DISCONTINUED | OUTPATIENT
Start: 2019-05-31 | End: 2019-06-18

## 2019-05-31 RX ORDER — DEXTROSE 50 % IN WATER 50 %
15 SYRINGE (ML) INTRAVENOUS ONCE
Refills: 0 | Status: DISCONTINUED | OUTPATIENT
Start: 2019-05-31 | End: 2019-06-18

## 2019-05-31 RX ORDER — DEXTROSE 50 % IN WATER 50 %
12.5 SYRINGE (ML) INTRAVENOUS ONCE
Refills: 0 | Status: DISCONTINUED | OUTPATIENT
Start: 2019-05-31 | End: 2019-06-18

## 2019-05-31 RX ORDER — ASPIRIN/CALCIUM CARB/MAGNESIUM 324 MG
81 TABLET ORAL DAILY
Refills: 0 | Status: DISCONTINUED | OUTPATIENT
Start: 2019-05-31 | End: 2019-06-01

## 2019-05-31 RX ORDER — SODIUM CHLORIDE 9 MG/ML
1000 INJECTION, SOLUTION INTRAVENOUS
Refills: 0 | Status: DISCONTINUED | OUTPATIENT
Start: 2019-05-31 | End: 2019-06-01

## 2019-05-31 RX ADMIN — HEPARIN SODIUM 5000 UNIT(S): 5000 INJECTION INTRAVENOUS; SUBCUTANEOUS at 13:33

## 2019-05-31 RX ADMIN — Medication 2 MILLIGRAM(S): at 04:51

## 2019-05-31 RX ADMIN — Medication 2 MILLIGRAM(S): at 23:07

## 2019-05-31 RX ADMIN — Medication 1 MILLIGRAM(S): at 17:35

## 2019-05-31 RX ADMIN — SODIUM CHLORIDE 80 MILLILITER(S): 9 INJECTION, SOLUTION INTRAVENOUS at 13:34

## 2019-05-31 RX ADMIN — HEPARIN SODIUM 5000 UNIT(S): 5000 INJECTION INTRAVENOUS; SUBCUTANEOUS at 05:17

## 2019-05-31 RX ADMIN — HEPARIN SODIUM 5000 UNIT(S): 5000 INJECTION INTRAVENOUS; SUBCUTANEOUS at 21:15

## 2019-05-31 NOTE — SWALLOW BEDSIDE ASSESSMENT ADULT - PHARYNGEAL PHASE
Decreased laryngeal elevation/Delayed pharyngeal swallow Within functional limits Delayed pharyngeal swallow/Decreased laryngeal elevation

## 2019-05-31 NOTE — ED ADULT NURSE NOTE - OBJECTIVE STATEMENT
pt BIBA presents to ED with alt ment status.  As per daughter her mother had slurred speech since 12pm.  Pt does not follow all commands and incoherent speech.  Pt hs rt arm bruising, rt eye swelling and crusted blood on lip. Pt BIBA presents to ED with alt ment status.  As per daughter her mother had slurred speech around 12pm.  Pt did not answer phone calls around 7pm.  When daughter got to the house, her mother was found on the floor ewith dried blood on lips.    Pt does not follow all commands and incoherent speech.  Pt hs rt arm bruising, rt eye swelling and crusted blood on lip.   As per daughter, mother lives independently in a senior citizen housing apartment. Pt BIBA presents to ED with alt ment status.  As per daughter her mother had slurred speech around 12pm.  Pt did not answer phone calls around 7pm.  When daughter got to the house, pt was found on the floor.  Pt lives independently in a senior citizen housing apartment.    Pt does not follow commands and presents with incoherent speech.  Pt hs rt arm bruising, rt eye swelling and crusted blood on lips.

## 2019-05-31 NOTE — PROGRESS NOTE ADULT - SUBJECTIVE AND OBJECTIVE BOX
Patient is a 74y old  Female who presents with a chief complaint of CVA (31 May 2019 03:45)      INTERVAL HPI/OVERNIGHT EVENTS:    MEDICATIONS  (STANDING):  aspirin enteric coated 81 milliGRAM(s) Oral daily  dextrose 5% + sodium chloride 0.45%. 1000 milliLiter(s) (80 mL/Hr) IV Continuous <Continuous>  heparin  Injectable 5000 Unit(s) SubCutaneous every 8 hours    MEDICATIONS  (PRN):      Allergies    No Known Allergies    Intolerances        REVIEW OF SYSTEMS:        HEENT - wnl  RESPIRATORY: No cough, wheezing, chills or hemoptysis; No shortness of breath  CARDIOVASCULAR: No chest pain, palpitations, dizziness, or leg swelling  GASTROINTESTINAL: No abdominal or epigastric pain. No nausea, vomiting, or hematemesis; No diarrhea or constipation. No melena or hematochezia.  GENITOURINARY: No dysuria, frequency, hematuria, or incontinence  SKIN: No itching, burning, rashes, or lesions   MUSCULOSKELETAL: No joint pain or swelling; No muscle, back, or extremity pain  PSYCHIATRIC: No depression, anxiety, mood swings, or difficulty sleeping        Vital Signs Last 24 Hrs  T(C): 37.1 (31 May 2019 04:44), Max: 37.1 (31 May 2019 04:44)  T(F): 98.7 (31 May 2019 04:44), Max: 98.7 (31 May 2019 04:44)  HR: 92 (31 May 2019 03:52) (92 - 106)  BP: 184/77 (31 May 2019 04:44) (142/62 - 184/77)  BP(mean): --  RR: 18 (31 May 2019 04:44) (17 - 21)  SpO2: 95% (31 May 2019 04:44) (88% - 95%)    PHYSICAL EXAM:  general       HEENT wnl  CHEST/LUNG: Clear to percussion bilaterally; No rales, rhonchi, wheezing, or rubs  HEART: Regular rate and rhythm; No murmurs, rubs, or gallops  ABDOMEN: Soft, Nontender, Nondistended; Bowel sounds present  EXTREMITIES:  2+ Peripheral Pulses, No clubbing, cyanosis, or edema  LYMPH: No lymphadenopathy noted  SKIN: No rashes or lesions    LABS:                        11.5   8.00  )-----------( CLUMPED    ( 31 May 2019 07:40 )             35.0     05-31    138  |  102  |  33<H>  ----------------------------<  104<H>  4.4   |  25  |  1.42<H>    Ca    10.3<H>      31 May 2019 07:40    TPro  7.9  /  Alb  4.1  /  TBili  0.6  /  DBili  x   /  AST  29  /  ALT  31  /  AlkPhos  58  05-31    PT/INR - ( 31 May 2019 01:21 )   PT: 11.4 sec;   INR: 1.02 ratio         PTT - ( 31 May 2019 01:21 )  PTT:26.7 sec    CAPILLARY BLOOD GLUCOSE      POCT Blood Glucose.: 148 mg/dL (30 May 2019 23:21)      CARDIAC MARKERS ( 31 May 2019 07:40 )  .726 ng/mL / x     / 362 U/L / x     / 5.7 ng/mL  CARDIAC MARKERS ( 31 May 2019 00:32 )  .517 ng/mL / x     / 215 U/L / x     / 4.1 ng/mL            RADIOLOGY & ADDITIONAL TESTS:    Imaging Personally Reviewed:  [y ] YES  [ ] NO    Consultant(s) Notes Reviewed:  [y ] YES  [ ] NO    Care Discussed with Consultants/Other Providers [ ] YES  [ ] NO    PROBLEMS:  CEREBRAL VASCULAR ACCIDENT,ELEVATED TROPONIN LEVEL ONE  WEAKNESS  CVA (cerebral vascular accident)  Preventive measure  Rectal cancer 2013  hx herpes Zoster  Elevated troponin I level  Cerebrovascular accident (CVA), unspecified mechanism  > troponin      Care discussed with family,         [x  ]   yes  [  ]  No c daughter at length    imp:    stable[ ]    unstable[x  ]     improving [   ]       unchanged  [  ]                Plans:  EKG stat  Neurology  Cardiology  MRI brain  Carotid doppler  ECHO  Stattin ASA heparin SC Patient is a 74y old  Female who presents with a chief complaint of CVA (31 May 2019 03:45)      INTERVAL HPI/OVERNIGHT EVENTS:    MEDICATIONS  (STANDING):  aspirin enteric coated 81 milliGRAM(s) Oral daily  dextrose 5% + sodium chloride 0.45%. 1000 milliLiter(s) (80 mL/Hr) IV Continuous <Continuous>  heparin  Injectable 5000 Unit(s) SubCutaneous every 8 hours    MEDICATIONS  (PRN):      Allergies    No Known Allergies    Intolerances        REVIEW OF SYSTEMS:        none verbal opening L eye      Vital Signs Last 24 Hrs  T(C): 37.1 (31 May 2019 04:44), Max: 37.1 (31 May 2019 04:44)  T(F): 98.7 (31 May 2019 04:44), Max: 98.7 (31 May 2019 04:44)  HR: 92 (31 May 2019 03:52) (92 - 106)  BP: 184/77 (31 May 2019 04:44) (142/62 - 184/77)  BP(mean): --  RR: 18 (31 May 2019 04:44) (17 - 21)  SpO2: 95% (31 May 2019 04:44) (88% - 95%)    PHYSICAL EXAM:  general       HEENT wnl  CHEST/LUNG: Clear to percussion bilaterally; No rales, rhonchi, wheezing, or rubs  HEART: Regular rate and rhythm; No murmurs, rubs, or gallops  ABDOMEN: Soft, Nontender, Nondistended; Bowel sounds present  EXTREMITIES:  2+ Peripheral Pulses, No clubbing, cyanosis, or edema  LYMPH: No lymphadenopathy noted  SKIN: No rashes or lesions  R cheek droop  R hemiparesis  LABS:                        11.5   8.00  )-----------( CLUMPED    ( 31 May 2019 07:40 )             35.0     05-31    138  |  102  |  33<H>  ----------------------------<  104<H>  4.4   |  25  |  1.42<H>    Ca    10.3<H>      31 May 2019 07:40    TPro  7.9  /  Alb  4.1  /  TBili  0.6  /  DBili  x   /  AST  29  /  ALT  31  /  AlkPhos  58  05-31    PT/INR - ( 31 May 2019 01:21 )   PT: 11.4 sec;   INR: 1.02 ratio         PTT - ( 31 May 2019 01:21 )  PTT:26.7 sec    CAPILLARY BLOOD GLUCOSE      POCT Blood Glucose.: 148 mg/dL (30 May 2019 23:21)      CARDIAC MARKERS ( 31 May 2019 07:40 )  .726 ng/mL / x     / 362 U/L / x     / 5.7 ng/mL  CARDIAC MARKERS ( 31 May 2019 00:32 )  .517 ng/mL / x     / 215 U/L / x     / 4.1 ng/mL            RADIOLOGY & ADDITIONAL TESTS:    Imaging Personally Reviewed:  [y ] YES  [ ] NO    Consultant(s) Notes Reviewed:  [y ] YES  [ ] NO    Care Discussed with Consultants/Other Providers [ ] YES  [ ] NO    PROBLEMS:  CEREBRAL VASCULAR ACCIDENT,ELEVATED TROPONIN LEVEL ONE  WEAKNESS  CVA (cerebral vascular accident)  Preventive measure  Rectal cancer 2013  hx herpes Zoster  Elevated troponin I level  Cerebrovascular accident (CVA), unspecified mechanism  > troponin      Care discussed with family,         [x  ]   yes  [  ]  No c daughter at length    imp:    stable[ ]    unstable[x  ]     improving [   ]       unchanged  [  ]                Plans:  EKG stat  Neurology  Cardiology  MRI brain  Carotid doppler  ECHO  Stattin ASA heparin SC

## 2019-05-31 NOTE — H&P ADULT - PROBLEM SELECTOR PLAN 4
DVT Prophylaxis with Heparin 5000units sc q8hrs  General precautions  *medications need to be reconciled, daughter will bring from home*

## 2019-05-31 NOTE — H&P ADULT - HISTORY OF PRESENT ILLNESS
Pt admitted for CVA, in progress. 74 year old woman with PMH rectal cancer s/p colostomy BIBEMS and found to have CVA.  Pt cannot provide history due to neuro deficit.  Per daughter at bedside, patient had complained of right-sided weakness earlier in the day.  She did not  her phone when called then was found lying on the floor at home.  Pt not a candidate for tPA due to unknown timing of symptoms.  Pt cannot provide history and is very agitated, yelling and crying, pulling at IV lines, climbing from bed.    In the ED, CT head shows evidence of acute infarct involving the left posterior insula and high left parietal cortex.

## 2019-05-31 NOTE — SWALLOW BEDSIDE ASSESSMENT ADULT - SWALLOW EVAL: DIAGNOSIS
pt presented with oropharyngeal phases of swallow marked by reduced cognition and refusal of po trials therefore limiting the eval, however noted decreased oral opening, slow AP transport, reduced mastication, possible delay in swallow trigger with decreased laryngeal elevation. no overt signs of aspiration with consistencies trialed

## 2019-05-31 NOTE — SWALLOW BEDSIDE ASSESSMENT ADULT - SLP GENERAL OBSERVATIONS
braintree 1 pt seen bedside, alert and oriented to self. pt inconsistently responded to autobiographical questions however most verbalizations jargon except for a few spontaneous utterances "that's enough" and "OK" or gestures for yes/no- to refuse or request po trials. she did not follow one step directions/models and noted head/visual gaze turned to the left- pt unable to visually locate to SLP

## 2019-05-31 NOTE — H&P ADULT - NSHPLABSRESULTS_GEN_ALL_CORE
Vital Signs Last 24 Hrs  T(C): 36.9 (31 May 2019 01:17), Max: 36.9 (31 May 2019 01:17)  T(F): 98.5 (31 May 2019 01:17), Max: 98.5 (31 May 2019 01:17)  HR: 92 (31 May 2019 03:52) (92 - 106)  BP: 142/62 (31 May 2019 03:52) (142/62 - 155/63)  BP(mean): --  RR: 17 (31 May 2019 03:52) (17 - 21)  SpO2: 95% (31 May 2019 03:52) (88% - 95%)          LABS:                        10.9   10.88 )-----------( 223      ( 31 May 2019 00:32 )             33.0     05-31    137  |  104  |  37<H>  ----------------------------<  135<H>  4.7   |  21<L>  |  1.91<H>    Ca    9.8      31 May 2019 00:32    TPro  7.9  /  Alb  4.1  /  TBili  0.6  /  DBili  x   /  AST  29  /  ALT  31  /  AlkPhos  58  05-31    PT/INR - ( 31 May 2019 01:21 )   PT: 11.4 sec;   INR: 1.02 ratio         PTT - ( 31 May 2019 01:21 )  PTT:26.7 sec      RADIOLOGY & ADDITIONAL STUDIES:    CT head:  IMPRESSION:  -No acute intracranial hemorrhage.  -Hyperdensity in the left sylvian inferior division middle cerebral artery   M2 branches reflective of thrombus. Evidence of acute infarct involving   the left posterior insula and high left parietal cortex.  -Moderate to severe chronic small vessel ischemic changes in the   frontoparietal white matter.

## 2019-05-31 NOTE — SWALLOW BEDSIDE ASSESSMENT ADULT - ORAL PREPARATORY PHASE
Decreased mastication ability Refuses to accept bolus into oral cavity/decreased oral opening which improved as eval progressed

## 2019-05-31 NOTE — CONSULT NOTE ADULT - SUBJECTIVE AND OBJECTIVE BOX
PATIENT OFF FLOOR AT MRI  CHART REVIEWED  CONSULT TO FOLLOW ELDERLY FEMALE CVA ?TIME OF ONSET/NO TPA  EQUIVOCAL TROPONIN MARKER(S)  ECG NORMAL SINUS RHYTHM; NO DYSRHYTHMIA AGE INDETERMINANT AWMI  ECHO PENDING        VARIABLE BLOOD PRESSURE  TELEMETRY: NORMAL SINUS RHYTHM     DYSARTHRIC  NO JVD  REGULAR RATE_&_RHYTHM;NORMAL_S1&S2_NO_SIGNIFICANT_MURMURS,RUBS,OR_GALLOPS.   COARSE BREATH SOUNDS  ABDOMEN SOFT, NONTENDER, NO DISTENSION   NO CLUBBING CYANOSIS OR EDEMA    TO REVIEW ECHO  CONTINUE TELEMETRY:   BLOOD PRESSURE CONTROL AS NEEDED  STATIN  NEURO  RECOMMENDATIONS  PENDING HOSPITAL COURSE     SAVANNAH POOL MD, FACC

## 2019-05-31 NOTE — H&P ADULT - NSHPPHYSICALEXAM_GEN_ALL_CORE
GENERAL: Elderly agitated woman screaming and moving frequently in bed, attempting to pull at IV lines and climbing  HEAD:  Atraumatic, Normocephalic  EYES: conjunctiva and sclera clear  ENMT: No tonsillar erythema, exudates, or enlargement; Moist mucous membranes, No lesions  NECK: Supple, No JVD, Normal thyroid  NERVOUS SYSTEM:  Awake, alert, not oriented, cannot participate in exam  CHEST/LUNG: Clear to ascultation bilaterally; No rales, rhonchi, wheezing, or rubs  HEART: Regular rate and rhythm; No murmurs, rubs, or gallops  ABDOMEN: Soft, Nondistended, colostomy in place  EXTREMITIES: no clubbing, cyanosis, or edema  SKIN: no rashes or lesions  PSYCH: agitated, anxious, tearful

## 2019-05-31 NOTE — SWALLOW BEDSIDE ASSESSMENT ADULT - COMMENTS
CT head no contrast 5/30/2019 IMPRESSION:No acute intracranial hemorrhage.  Hyperdensity in the left sylvian inferior division middle cerebral artery M2 branches reflective of thrombus. Evidence of acute infarct involving   the left posterior insula and high left parietal cortex.Moderate to severe chronic small vessel ischemic changes in the frontoparietal white matter.    CXR 5/30/2019 IMPRESSION: Clear lungs at this time.

## 2019-05-31 NOTE — H&P ADULT - PROBLEM SELECTOR PLAN 1
Neurochecks Q 4 hrly.  Aspirin daily  TTE, carotid doppler, MRI brain  Neurology consult.  PT Evaluation.  Speech & swallow evaluation in am.

## 2019-05-31 NOTE — SWALLOW BEDSIDE ASSESSMENT ADULT - H & P REVIEW
yes/74 year old woman with PMH rectal cancer s/p colostomy BIBEMS and found to have CVA.  Pt cannot provide history due to neuro deficit.  Per daughter at bedside, patient had complained of right-sided weakness earlier in the day.  She did not  her phone when called then was found lying on the floor at home.  Pt not a candidate for tPA due to unknown timing of symptoms.  Pt cannot provide history and is very agitated, yelling and crying, pulling at IV lines, climbing from bed

## 2019-05-31 NOTE — H&P ADULT - ASSESSMENT
74 year old woman with PMH rectal cancer s/p colostomy BIBEMS and found to have CVA.  Pt cannot provide history due to neuro deficit.  Patient will require admission for at least 2 midnights as detailed below:    IMPROVE VTE Individual Risk Assessment          RISK                                                          Points    [  ] Previous VTE                                                3    [  ] Thrombophilia                                             2    [  ] Lower limb paralysis                                    2        (unable to hold up >15 seconds)      [  ] Current Cancer                                             2         (within 6 months)    [x  ] Immobilization > 24 hrs                              1    [  ] ICU/CCU stay > 24 hours                            1    [x  ] Age > 60                                                    1    IMPROVE VTE Score ______2___

## 2019-06-01 LAB
ANION GAP SERPL CALC-SCNC: 12 MMOL/L — SIGNIFICANT CHANGE UP (ref 5–17)
BUN SERPL-MCNC: 19 MG/DL — SIGNIFICANT CHANGE UP (ref 7–23)
CALCIUM SERPL-MCNC: 9.7 MG/DL — SIGNIFICANT CHANGE UP (ref 8.5–10.1)
CHLORIDE SERPL-SCNC: 103 MMOL/L — SIGNIFICANT CHANGE UP (ref 96–108)
CO2 SERPL-SCNC: 23 MMOL/L — SIGNIFICANT CHANGE UP (ref 22–31)
CREAT SERPL-MCNC: 1.03 MG/DL — SIGNIFICANT CHANGE UP (ref 0.5–1.3)
GLUCOSE BLDC GLUCOMTR-MCNC: 100 MG/DL — HIGH (ref 70–99)
GLUCOSE BLDC GLUCOMTR-MCNC: 121 MG/DL — HIGH (ref 70–99)
GLUCOSE BLDC GLUCOMTR-MCNC: 131 MG/DL — HIGH (ref 70–99)
GLUCOSE BLDC GLUCOMTR-MCNC: 153 MG/DL — HIGH (ref 70–99)
GLUCOSE BLDC GLUCOMTR-MCNC: 97 MG/DL — SIGNIFICANT CHANGE UP (ref 70–99)
GLUCOSE SERPL-MCNC: 105 MG/DL — HIGH (ref 70–99)
HCT VFR BLD CALC: 34.3 % — LOW (ref 34.5–45)
HGB BLD-MCNC: 11.3 G/DL — LOW (ref 11.5–15.5)
MCHC RBC-ENTMCNC: 29.3 PG — SIGNIFICANT CHANGE UP (ref 27–34)
MCHC RBC-ENTMCNC: 32.9 GM/DL — SIGNIFICANT CHANGE UP (ref 32–36)
MCV RBC AUTO: 88.9 FL — SIGNIFICANT CHANGE UP (ref 80–100)
NRBC # BLD: 0 /100 WBCS — SIGNIFICANT CHANGE UP (ref 0–0)
PLATELET # BLD AUTO: 231 K/UL — SIGNIFICANT CHANGE UP (ref 150–400)
POTASSIUM SERPL-MCNC: 3.8 MMOL/L — SIGNIFICANT CHANGE UP (ref 3.5–5.3)
POTASSIUM SERPL-SCNC: 3.8 MMOL/L — SIGNIFICANT CHANGE UP (ref 3.5–5.3)
RBC # BLD: 3.86 M/UL — SIGNIFICANT CHANGE UP (ref 3.8–5.2)
RBC # FLD: 14.9 % — HIGH (ref 10.3–14.5)
SODIUM SERPL-SCNC: 138 MMOL/L — SIGNIFICANT CHANGE UP (ref 135–145)
WBC # BLD: 5.92 K/UL — SIGNIFICANT CHANGE UP (ref 3.8–10.5)
WBC # FLD AUTO: 5.92 K/UL — SIGNIFICANT CHANGE UP (ref 3.8–10.5)

## 2019-06-01 RX ORDER — ASPIRIN/CALCIUM CARB/MAGNESIUM 324 MG
300 TABLET ORAL DAILY
Refills: 0 | Status: DISCONTINUED | OUTPATIENT
Start: 2019-06-02 | End: 2019-06-18

## 2019-06-01 RX ORDER — LISINOPRIL 2.5 MG/1
10 TABLET ORAL DAILY
Refills: 0 | Status: DISCONTINUED | OUTPATIENT
Start: 2019-06-01 | End: 2019-06-04

## 2019-06-01 RX ORDER — ASPIRIN/CALCIUM CARB/MAGNESIUM 324 MG
600 TABLET ORAL ONCE
Refills: 0 | Status: COMPLETED | OUTPATIENT
Start: 2019-06-01 | End: 2019-06-01

## 2019-06-01 RX ORDER — METOPROLOL TARTRATE 50 MG
25 TABLET ORAL
Refills: 0 | Status: DISCONTINUED | OUTPATIENT
Start: 2019-06-01 | End: 2019-06-18

## 2019-06-01 RX ORDER — SODIUM CHLORIDE 9 MG/ML
1000 INJECTION, SOLUTION INTRAVENOUS
Refills: 0 | Status: DISCONTINUED | OUTPATIENT
Start: 2019-06-01 | End: 2019-06-05

## 2019-06-01 RX ADMIN — HEPARIN SODIUM 5000 UNIT(S): 5000 INJECTION INTRAVENOUS; SUBCUTANEOUS at 21:21

## 2019-06-01 RX ADMIN — HEPARIN SODIUM 5000 UNIT(S): 5000 INJECTION INTRAVENOUS; SUBCUTANEOUS at 13:10

## 2019-06-01 RX ADMIN — Medication 2 MILLIGRAM(S): at 21:21

## 2019-06-01 RX ADMIN — HEPARIN SODIUM 5000 UNIT(S): 5000 INJECTION INTRAVENOUS; SUBCUTANEOUS at 05:01

## 2019-06-01 RX ADMIN — Medication 0.5 MILLIGRAM(S): at 15:55

## 2019-06-01 RX ADMIN — Medication 600 MILLIGRAM(S): at 11:10

## 2019-06-01 RX ADMIN — Medication 25 MILLIGRAM(S): at 17:08

## 2019-06-01 RX ADMIN — SODIUM CHLORIDE 60 MILLILITER(S): 9 INJECTION, SOLUTION INTRAVENOUS at 08:50

## 2019-06-01 NOTE — PHYSICAL THERAPY INITIAL EVALUATION ADULT - PATIENT/FAMILY/SIGNIFICANT OTHER GOALS STATEMENT, PT EVAL
Pt did not state any goals, her daughter wants to see her mother be able to walk by herself if possible using walking device..

## 2019-06-01 NOTE — PHYSICAL THERAPY INITIAL EVALUATION ADULT - IMPAIRMENTS CONTRIBUTING TO GAIT DEVIATIONS, PT EVAL
impaired motor control/ataxic/abnormal muscle tone/impaired balance/decreased flexibility/impaired coordination/decreased strength

## 2019-06-01 NOTE — PHYSICAL THERAPY INITIAL EVALUATION ADULT - GENERAL OBSERVATIONS, REHAB EVAL
Pt found supine, alert, slurred speech, incoherent, confused, not following instructions consistenly.

## 2019-06-01 NOTE — PHYSICAL THERAPY INITIAL EVALUATION ADULT - IMPAIRMENTS FOUND, PT EVAL
gross motor/ROM/reflex integrity/gait, locomotion, and balance/muscle strength/ergonomics and body mechanics/aerobic capacity/endurance/arousal, attention, and cognition/fine motor

## 2019-06-01 NOTE — PHYSICAL THERAPY INITIAL EVALUATION ADULT - STRENGTHENING, PT EVAL
Improve strength in the all extremities to 5/5 and be able to perform functional tasks-bed mobility, sitting, standing, transfers and ambulate in a safe manner using assistive device if needed and prevent falls.

## 2019-06-01 NOTE — PHYSICAL THERAPY INITIAL EVALUATION ADULT - PERTINENT HX OF CURRENT PROBLEM, REHAB EVAL
Pt admitted with dx CVA 5/31 MRI: Acute Ischemia is documented with diffusion restriction in the Left MCA;

## 2019-06-01 NOTE — PHYSICAL THERAPY INITIAL EVALUATION ADULT - IMPAIRED TRANSFERS: SIT/STAND, REHAB EVAL
decreased flexibility/impaired balance/impaired coordination/narrow base of support/impaired postural control

## 2019-06-01 NOTE — PHYSICAL THERAPY INITIAL EVALUATION ADULT - COORDINATION ASSESSED, REHAB EVAL
unable to test patient not following directions/finger to nose/heel to shin heel to shin/finger to nose/unable to test patient not following directions

## 2019-06-01 NOTE — PHYSICAL THERAPY INITIAL EVALUATION ADULT - GAIT TRAINING, PT EVAL
Improve standing and ambulation balance with contact guard to min assist  up to 100 feet using rolling walker or cane with

## 2019-06-01 NOTE — CONSULT NOTE ADULT - SUBJECTIVE AND OBJECTIVE BOX
Subjective Complaints:  Historian:       Consult requested by ER doctor:                  Attending:     HPI:  74 year old woman with PMH rectal cancer s/p colostomy BIBEMS and found to have CVA.  Pt cannot provide history due to neuro deficit.  Per daughter at bedside, patient had complained of right-sided weakness earlier in the day.  She did not  her phone when called then was found lying on the floor at home.  Pt not a candidate for tPA due to unknown timing of symptoms.  Pt cannot provide history and is very agitated, yelling and crying, pulling at IV lines, climbing from bed.Patient has difficulty in word findings     In the ED, CT head shows evidence of acute infarct involving the left posterior insula and high left parietal cortex. (31 May 2019 03:45)    RENAN DE LEON    PAST MEDICAL & SURGICAL HISTORY:  Shingles (herpes zoster) polyneuropathy  Rectal cancer  Port catheter in place: infusaport.  Chemotherapy follow-up examination: infusa port  S/P colostomy: for rectal ca. in 2/10/2012.  S/P colectomy: after colonoscopy in 10/4/2011.  S/P colonoscopy: 2011.  74yFemale    MEDICATIONS  (STANDING):  aspirin Suppository 600 milliGRAM(s) Rectal daily  dextrose 5% + sodium chloride 0.9%. 1000 milliLiter(s) (60 mL/Hr) IV Continuous <Continuous>  dextrose 5%. 1000 milliLiter(s) (50 mL/Hr) IV Continuous <Continuous>  dextrose 50% Injectable 12.5 Gram(s) IV Push once  dextrose 50% Injectable 25 Gram(s) IV Push once  dextrose 50% Injectable 25 Gram(s) IV Push once  heparin  Injectable 5000 Unit(s) SubCutaneous every 8 hours  lisinopril 10 milliGRAM(s) Oral daily  metoprolol tartrate 25 milliGRAM(s) Oral two times a day    MEDICATIONS  (PRN):  dextrose 40% Gel 15 Gram(s) Oral once PRN Blood Glucose LESS THAN 70 milliGRAM(s)/deciLiter  glucagon  Injectable 1 milliGRAM(s) IntraMuscular once PRN Glucose <70 milliGRAM(s)/deciLiter      Allergies    No Known Allergies    Intolerances      FAMILY HISTORY:  No pertinent family history in first degree relatives      REVIEW OF SYSTEMS:  General:  No wt loss, fevers, chills, night sweats  Eyes:  Good vision, no reported pain  ENT:  No sore throat, pain, runny nose, dysphagia  CV:  No pain, palpitatioins, hypo/hypertension  Resp:  No dyspnea, cough, tachypnea, wheezing  GI:  No pain, nausea, vomiting, diarrhea, constipatiion  :  No pain, bleeding, incontinence, nocturia  Muscle:  No pain, weakness  Breast:  No pain, abscess, mass, discharge  Neuro:  No weakness, tingling, memory problems  Psych:  No fatigue, insomnia, mood problems, depression  Endocrine:  No polyuria, polydypsia, cold/heat intolerance  Heme:  No petechiae, ecchymosis, easy bruisability  Skin:  No rash, tattoos, scars, edema      Vital Signs Last 24 Hrs  T(C): 36.3 (01 Jun 2019 05:20), Max: 36.8 (31 May 2019 17:37)  T(F): 97.3 (01 Jun 2019 05:20), Max: 98.2 (31 May 2019 17:37)  HR: 95 (01 Jun 2019 05:20) (82 - 95)  BP: 161/69 (01 Jun 2019 05:20) (152/69 - 175/79)  BP(mean): --  RR: 17 (01 Jun 2019 05:20) (17 - 18)  SpO2: 93% (01 Jun 2019 05:20) (93% - 97%)    GENERAL PHYSICAL EXAM:  General:  Appears stated age, well-groomed, well-nourished, no distress  HEENT:  NC/AT, patent nares w/ pink mucosa, OP clear w/o lesions, PERRL, EOMI, conjunctivae clear, no thyromegaly, nodules, adenopathy, no JVD  Chest:  Full & symmetric excursion, no increased effort, breath sounds clear  Cardiovascular:  Regular rhythm, S1, S2, no murmur/rub/S3/S4, no carotid/femoral/abdominal bruit, radial/pedal pulses 2+, no edema  Abdomen:  Soft, non-tender, non-distended, normoactive bowel sounds, no HSM  Extremities:  Gait & station:   Digits:   Nails:   Joints, Bones, Muscles:   ROM:   Stability:  Skin:  No rash/erythema/ecchymoses/petechiae/wounds/abscess/warm/dry  Musculoskeletal:  Full ROM in all joints w/o swelling/tenderness/effusion    NEUROLOGICAL EXAM:  HENT:  Normocephalic head; atraumatic head.  Neck supple.  ENT: normal looking.  Mental State:    Awake,  oriented to person,only  Speech incoherent with word finding difficulty   Cooperative.  Responds appropriately.    Cranial Nerves:  II-XII:   Pupils round and reactive to light and accommodation.  Extraocular movements full.  Visual fields full (no homonymous hemianopsia).  Visual acuity wnl.  Facial symmetry intact.  Tongue midline.Swallowing reflexe is impaired   Motor Functions:  Motor strength is 5/5 on the left and 4+/5 on the right   Sensory Functions:   Intact to touch and pinprick to face and extremities.    Reflexes:  Deep tendon reflexes normoactive to biceps, knees and ankles.  Babinski absent (present).  Cerebellar Testing:    Finger to nose intact.  Nystagmus absent.  Gait: difficulty standing     LABS:                        11.3   5.92  )-----------( 231      ( 01 Jun 2019 08:10 )             34.3     06-01    138  |  103  |  19  ----------------------------<  105<H>  3.8   |  23  |  1.03    Ca    9.7      01 Jun 2019 08:10    TPro  7.9  /  Alb  4.1  /  TBili  0.6  /  DBili  x   /  AST  29  /  ALT  31  /  AlkPhos  58  05-31    PT/INR - ( 31 May 2019 01:21 )   PT: 11.4 sec;   INR: 1.02 ratio         PTT - ( 31 May 2019 01:21 )  PTT:26.7 sec        RADIOLOGY & ADDITIONAL STUDIES:    Complete Blood Count: AM Sched. Collection: 01-Jun-2019 05:00 (05-31 @ 09:57)  Basic Metabolic Panel: AM Sched. Collection: 01-Jun-2019 05:00 (05-31 @ 09:57)  Blood Glucose Point Of Care Testing:     Frequency:  every 6 hours    Additional Instructions:  while npo (05-31 @ 10:33)  dextrose 5%.: Solution, 1000 milliLiter(s) infuse at 50 mL/Hr  Special Instructions: Conditional Order: RESPONSIVE patient and Blood Glucose LESS THAN 70 milliGRAM(s)/deciLiter AFTER 2 oral (PO) treatments. NOTIFY Provider and Recheck Blood Glucose every 15 minutes until Blood Glucose is GREATER THAN or EQUAL  milliGRAM(s)/deciLiter  Provider's Contact #: [phone] (05-31 @ 10:34)  Administer Carbohydrates:     Additional Instructions:  RESPONSIVE patient and Blood Glucose is LESS THAN 70 milliGRAM(s)/deciLiter: Administer 15 grams of fast acting carbohydrate [e.g., Give 4 ounces of APPLE Juice OR 6 ounces of NON-DIET soda OR 1 tube (15 grams) oral glucose gel (available in Automated Dispensing Machine)] and recheck capillary blood glucose in 15 minutes.  Repeat treatment and recheck glucose every 15 minutes until Blood Glucose is GREATER THAN or EQUAL  milliGRAM(s)/deciLiter and NOTIFY Provider.  HYPOGLYCEMIA PROTOCOL (05-31 @ 10:34)  dextrose 40% Gel: [Known as GLUTOSE 15]  15 Gram(s), Oral, once, PRN for Blood Glucose LESS THAN 70 milliGRAM(s)/deciLiter, Stop After 1 Doses  Special Instructions: Conditional Order: RESPONSIVE patient and Blood Glucose is LESS THAN 70 milliGRAM(s)/deciLiter: Administer 1 tube (15 grams) oral glucose gel (available in Automated Dispensing Machine) and recheck capillary blood glucose in 15 minutes.  Repeat treatment  Administration Instructions: Contents of 37.5 Gram(s) tube delivers 15 Gram(s) dextrose  Provider's Contact #: [phone] (05-31 @ 10:34)  dextrose 50% Injectable:   12.5 Gram(s), IV Push, once, Stop After 1 Doses  Special Instructions: Conditional Order: INITIAL Dose: RESPONSIVE and NPO patient WITH intravenous (IV) access and Blood Glucose BETWEEN 50-69 milliGRAM(s)/deciLiter.  NOTIFY Provider and recheck Blood Glucose in 15 minutes.  HYPOGLYCEMIA PROTOCOL  Provider's Contact #: [phone] (05-31 @ 10:34)  dextrose 50% Injectable:   25 Gram(s), IV Push, once, Stop After 1 Doses  Special Instructions: Conditional Order: INITIAL Dose: RESPONSIVE and NPO patient WITH intravenous (IV) access and Blood Glucose LESS THAN 50 milliGRAM(s)/deciLiter.  NOTIFY Provider and recheck Blood Glucose in 15 minutes.  HYPOGLYCEMIA PROTOCOL  Provider's Contact #: [phone] (05-31 @ 10:34)  dextrose 50% Injectable:   25 Gram(s), IV Push, once, Stop After 1 Doses  Special Instructions: Conditional Order: REPEAT Dose: RESPONSIVE and NPO patient WITH intravenous (IV) access and REPEAT Blood Glucose LESS THAN 100  milliGRAM(s)/deciLiter AFTER First treatment of D50.  NOTIFY Provider and recheck Blood Glucose in 15 minutes.  HYPOGLYCEMIA NM  Provider's Contact #: [phone] (05-31 @ 10:34)  glucagon  Injectable:   1 milliGRAM(s), IntraMuscular, once, PRN for Glucose <70 milliGRAM(s)/deciLiter, Stop After 1 Doses  Special Instructions: Conditional Order: RESPONSIVE and NPO patient with NO intravenous (IV) access and Blood Glucose LESS THAN 70 milliGRAM(s)/deciLiter  and NOTIFY Provider immediately for further instructions. Recheck Blood Glucose in 15 minutes.  HYPOGLYCEMIA PROTOCOL  Provider's Contact #: [phone] (05-31 @ 10:34)  Provider to RN:       UNRESPONSIVE patient and Blood Glucose LESS THAN 70 milliGRAM(s)/deciLiter call Rapid Response. HYPOGLYCEMIA PROTOCOL (05-31 @ 10:34)  LORazepam   Injectable: [Ordered as ATIVAN Injectable]  1 milliGRAM(s), IntraMuscular, once, Stop After 1 Doses  Indication: agitation  Administration Instructions: This is a Look-alike/Sound-alike Medication  Provider's Contact #: [phone] (05-31 @ 17:14)  LORazepam   Injectable: [Known as ATIVAN Injectable]  2 milliGRAM(s), IV Push, once, PRN for Agitation, Stop After 1 Doses  Administration Instructions: This is a Look-alike/Sound-alike Medication  Provider's Contact #: [phone] (05-31 @ 22:59)  POCT  Blood Glucose: 02:56 (06-01 @ 02:58)  POCT  Blood Glucose: 07:34 (06-01 @ 07:37)  lisinopril: [Known as ZESTRIL]  10 milliGRAM(s), Oral, daily  Provider's Contact #: [phone] (06-01 @ 08:03)  metoprolol tartrate: [Known as LOPRESSOR]  25 milliGRAM(s), Oral, two times a day  Special Instructions: hold SBP < 110 HR < 60  Provider's Contact #: [phone] (06-01 @ 08:03)  Provider to RN:       Acu checks q 6 hours. Call if BS < 75 or > 250 (06-01 @ 08:03)  Complete Blood Count: AM Sched. Collection: 02-Jun-2019 05:00 (06-01 @ 08:03)  Basic Metabolic Panel: AM Sched. Collection: 02-Jun-2019 05:00 (06-01 @ 08:03)  Consult- Registered Dietitian:    Reason for Consult: Signif. decrease of oral intake > 5d prior to admit (06-01 @ 08:14)  dextrose 5% + sodium chloride 0.9%.: Solution, 1000 milliLiter(s) infuse at 60 mL/Hr  Provider's Contact #: [phone] (06-01 @ 08:24)  aspirin Suppository:   600 milliGRAM(s), Rectal, daily  Administration Instructions: for rectal use  Provider's Contact #: [phone] (06-01 @ 09:55)      Assessment & Opinion:74 y o woman admitted for cva is found to have left M2 OCCLUSION IN THE INSULAR CORTEX ,WAS NOT A CANDIDATE FOR TPA BECAUSE TIME OF ONSET OF SYMPTOMS CAN NOT BE ASSESSED     Recommendations:  Brain MRI.  Carotid doppler.  Echocardiogram.    DVT prophylaxis as ordered. FORMAL swallowing evaluation   Medications:  ASA SUPPOSITORY

## 2019-06-01 NOTE — PHYSICAL THERAPY INITIAL EVALUATION ADULT - GAIT DEVIATIONS NOTED, PT EVAL
decreased velocity of limb motion/decreased stride length/decreased dawna/decreased step length/increased stride width

## 2019-06-01 NOTE — PHYSICAL THERAPY INITIAL EVALUATION ADULT - BED MOBILITY TRAINING, PT EVAL
Contact guard to min  assist of 1 in bed mobility- rolling side to side, supine to sit and vice versa.

## 2019-06-01 NOTE — PHYSICAL THERAPY INITIAL EVALUATION ADULT - FOLLOWS COMMANDS/ANSWERS QUESTIONS, REHAB EVAL
unable to follow multi-step instructions/incoherent, confused/unable to answer questions/unable to follow commands

## 2019-06-01 NOTE — PROGRESS NOTE ADULT - SUBJECTIVE AND OBJECTIVE BOX
Patient is a 74y old  Female who presents with a chief complaint of CVA (31 May 2019 15:37)      INTERVAL HPI/OVERNIGHT EVENTS: AA aphasia    MEDICATIONS  (STANDING):  aspirin enteric coated 81 milliGRAM(s) Oral daily  dextrose 5% + sodium chloride 0.45%. 1000 milliLiter(s) (80 mL/Hr) IV Continuous <Continuous>  dextrose 5%. 1000 milliLiter(s) (50 mL/Hr) IV Continuous <Continuous>  dextrose 50% Injectable 12.5 Gram(s) IV Push once  dextrose 50% Injectable 25 Gram(s) IV Push once  dextrose 50% Injectable 25 Gram(s) IV Push once  heparin  Injectable 5000 Unit(s) SubCutaneous every 8 hours  lisinopril 10 milliGRAM(s) Oral daily  metoprolol tartrate 25 milliGRAM(s) Oral two times a day    MEDICATIONS  (PRN):  dextrose 40% Gel 15 Gram(s) Oral once PRN Blood Glucose LESS THAN 70 milliGRAM(s)/deciLiter  glucagon  Injectable 1 milliGRAM(s) IntraMuscular once PRN Glucose <70 milliGRAM(s)/deciLiter      Allergies    No Known Allergies    Intolerances        REVIEW OF SYSTEMS:    impossible, appears comfortable          Vital Signs Last 24 Hrs  T(C): 36.3 (01 Jun 2019 05:20), Max: 36.8 (31 May 2019 17:37)  T(F): 97.3 (01 Jun 2019 05:20), Max: 98.2 (31 May 2019 17:37)  HR: 95 (01 Jun 2019 05:20) (82 - 95)  BP: 161/69 (01 Jun 2019 05:20) (152/69 - 175/79)  BP(mean): --  RR: 17 (01 Jun 2019 05:20) (17 - 18)  SpO2: 93% (01 Jun 2019 05:20) (93% - 97%)    PHYSICAL EXAM:  general       HEENT wnl  CHEST/LUNG: Clear to percussion bilaterally; No rales, rhonchi, wheezing, or rubs  HEART: Regular rate and rhythm; No murmurs, rubs, or gallops  ABDOMEN: Soft, Nontender, Nondistended; Bowel sounds present  EXTREMITIES:  2+ Peripheral Pulses, No clubbing, cyanosis, or edema  LYMPH: No lymphadenopathy noted  SKIN: No rashes or lesions      MTI L MCA infarction  LABS:                        11.5   8.00  )-----------( CLUMPED    ( 31 May 2019 07:40 )             35.0     05-31    138  |  102  |  33<H>  ----------------------------<  104<H>  4.4   |  25  |  1.42<H>    Ca    10.3<H>      31 May 2019 07:40    TPro  7.9  /  Alb  4.1  /  TBili  0.6  /  DBili  x   /  AST  29  /  ALT  31  /  AlkPhos  58  05-31    PT/INR - ( 31 May 2019 01:21 )   PT: 11.4 sec;   INR: 1.02 ratio         PTT - ( 31 May 2019 01:21 )  PTT:26.7 sec    CAPILLARY BLOOD GLUCOSE      POCT Blood Glucose.: 97 mg/dL (01 Jun 2019 07:34)  POCT Blood Glucose.: 121 mg/dL (01 Jun 2019 02:56)      CARDIAC MARKERS ( 31 May 2019 15:04 )  .544 ng/mL / x     / 363 U/L / x     / 4.0 ng/mL  CARDIAC MARKERS ( 31 May 2019 07:40 )  .726 ng/mL / x     / 362 U/L / x     / 5.7 ng/mL  CARDIAC MARKERS ( 31 May 2019 00:32 )  .517 ng/mL / x     / 215 U/L / x     / 4.1 ng/mL      Hemoglobin A1C, Whole Blood: 5.4 % (05-31 @ 11:32)    Cholesterol, Serum: 202 mg/dL (05-31 @ 11:37)  HDL Cholesterol, Serum: 94 mg/dL (05-31 @ 11:37)  Triglycerides, Serum: 42 mg/dL (05-31 @ 11:37)    Carotid doppler neg  ECHO nl EF wnl    RADIOLOGY & ADDITIONAL TESTS:    Imaging Personally Reviewed:  [y ] YES  [ ] NO    Consultant(s) Notes Reviewed:  [y ] YES  [ ] NO    Care Discussed with Consultants/Other Providers [ ] YES  [ ] NO    PROBLEMS:  CEREBRAL VASCULAR ACCIDENT,ELEVATED TROPONIN LEVEL ONE  WEAKNESS  CVA (cerebral vascular accident)  Preventive measure  Rectal cancer  Elevated troponin I level  Cerebrovascular accident (CVA), unspecified mechanism  HTN  > troponin      Care discussed with family,         [ x ]   yes  [  ]  No    imp:    stable[ ]    unstable[ x ]     improving [   ]       unchanged  [  ]                  Plans:  Continue present plans  [  ]  Lisinopril 10mg po qd  Metoprolol 25 mg po bid  Neurology consult P

## 2019-06-02 LAB
ANION GAP SERPL CALC-SCNC: 9 MMOL/L — SIGNIFICANT CHANGE UP (ref 5–17)
BUN SERPL-MCNC: 13 MG/DL — SIGNIFICANT CHANGE UP (ref 7–23)
CALCIUM SERPL-MCNC: 9 MG/DL — SIGNIFICANT CHANGE UP (ref 8.5–10.1)
CHLORIDE SERPL-SCNC: 109 MMOL/L — HIGH (ref 96–108)
CO2 SERPL-SCNC: 24 MMOL/L — SIGNIFICANT CHANGE UP (ref 22–31)
CREAT SERPL-MCNC: 0.85 MG/DL — SIGNIFICANT CHANGE UP (ref 0.5–1.3)
GLUCOSE BLDC GLUCOMTR-MCNC: 102 MG/DL — HIGH (ref 70–99)
GLUCOSE BLDC GLUCOMTR-MCNC: 107 MG/DL — HIGH (ref 70–99)
GLUCOSE BLDC GLUCOMTR-MCNC: 118 MG/DL — HIGH (ref 70–99)
GLUCOSE BLDC GLUCOMTR-MCNC: 122 MG/DL — HIGH (ref 70–99)
GLUCOSE BLDC GLUCOMTR-MCNC: 95 MG/DL — SIGNIFICANT CHANGE UP (ref 70–99)
GLUCOSE SERPL-MCNC: 114 MG/DL — HIGH (ref 70–99)
HCT VFR BLD CALC: 30.7 % — LOW (ref 34.5–45)
HGB BLD-MCNC: 9.9 G/DL — LOW (ref 11.5–15.5)
MCHC RBC-ENTMCNC: 29.2 PG — SIGNIFICANT CHANGE UP (ref 27–34)
MCHC RBC-ENTMCNC: 32.2 GM/DL — SIGNIFICANT CHANGE UP (ref 32–36)
MCV RBC AUTO: 90.6 FL — SIGNIFICANT CHANGE UP (ref 80–100)
NRBC # BLD: 0 /100 WBCS — SIGNIFICANT CHANGE UP (ref 0–0)
PLATELET # BLD AUTO: 224 K/UL — SIGNIFICANT CHANGE UP (ref 150–400)
POTASSIUM SERPL-MCNC: 3.3 MMOL/L — LOW (ref 3.5–5.3)
POTASSIUM SERPL-SCNC: 3.3 MMOL/L — LOW (ref 3.5–5.3)
RBC # BLD: 3.39 M/UL — LOW (ref 3.8–5.2)
RBC # FLD: 14.9 % — HIGH (ref 10.3–14.5)
SODIUM SERPL-SCNC: 142 MMOL/L — SIGNIFICANT CHANGE UP (ref 135–145)
WBC # BLD: 5.45 K/UL — SIGNIFICANT CHANGE UP (ref 3.8–10.5)
WBC # FLD AUTO: 5.45 K/UL — SIGNIFICANT CHANGE UP (ref 3.8–10.5)

## 2019-06-02 RX ORDER — METOPROLOL TARTRATE 50 MG
5 TABLET ORAL ONCE
Refills: 0 | Status: COMPLETED | OUTPATIENT
Start: 2019-06-02 | End: 2019-06-02

## 2019-06-02 RX ORDER — POTASSIUM CHLORIDE 20 MEQ
10 PACKET (EA) ORAL
Refills: 0 | Status: COMPLETED | OUTPATIENT
Start: 2019-06-02 | End: 2019-06-02

## 2019-06-02 RX ADMIN — HEPARIN SODIUM 5000 UNIT(S): 5000 INJECTION INTRAVENOUS; SUBCUTANEOUS at 12:59

## 2019-06-02 RX ADMIN — Medication 5 MILLIGRAM(S): at 06:14

## 2019-06-02 RX ADMIN — Medication 100 MILLIEQUIVALENT(S): at 21:52

## 2019-06-02 RX ADMIN — Medication 1 MILLIGRAM(S): at 16:13

## 2019-06-02 RX ADMIN — HEPARIN SODIUM 5000 UNIT(S): 5000 INJECTION INTRAVENOUS; SUBCUTANEOUS at 21:53

## 2019-06-02 RX ADMIN — Medication 1 MILLIGRAM(S): at 19:51

## 2019-06-02 RX ADMIN — SODIUM CHLORIDE 60 MILLILITER(S): 9 INJECTION, SOLUTION INTRAVENOUS at 16:13

## 2019-06-02 RX ADMIN — Medication 1 MILLIGRAM(S): at 23:40

## 2019-06-02 RX ADMIN — Medication 300 MILLIGRAM(S): at 11:30

## 2019-06-02 RX ADMIN — Medication 100 MILLIEQUIVALENT(S): at 22:52

## 2019-06-02 RX ADMIN — Medication 100 MILLIEQUIVALENT(S): at 23:43

## 2019-06-02 RX ADMIN — Medication 25 MILLIGRAM(S): at 18:06

## 2019-06-02 RX ADMIN — Medication 2 MILLIGRAM(S): at 02:46

## 2019-06-02 RX ADMIN — SODIUM CHLORIDE 60 MILLILITER(S): 9 INJECTION, SOLUTION INTRAVENOUS at 02:45

## 2019-06-02 RX ADMIN — SODIUM CHLORIDE 60 MILLILITER(S): 9 INJECTION, SOLUTION INTRAVENOUS at 23:49

## 2019-06-02 RX ADMIN — Medication 1 MILLIGRAM(S): at 10:15

## 2019-06-02 RX ADMIN — HEPARIN SODIUM 5000 UNIT(S): 5000 INJECTION INTRAVENOUS; SUBCUTANEOUS at 06:14

## 2019-06-02 NOTE — PROGRESS NOTE ADULT - SUBJECTIVE AND OBJECTIVE BOX
TELEMETRY: NORMAL SINUS RHYTHM SINUS BRADYCARDIA   ECHO NORMAL LVEF  VARIABLE BLOOD PRESSURE : ON ACE INHIBITOR ,BB   RESTING AFTER SEDATION    CVA  BLOOD PRESSURE MGT AS PER NEURO (PERMISSIVE)  NO EVIDENCE OF ATRIAL FIBRILLATION   SUPPORTIVE CARE  FAMILY PRESENT AT BEDSIDE     SAVANNAH POOL MD, FACC

## 2019-06-02 NOTE — PROGRESS NOTE ADULT - SUBJECTIVE AND OBJECTIVE BOX
Patient is a 74y old  Female who presents with a chief complaint of CVA (01 Jun 2019 09:56)      INTERVAL HPI/OVERNIGHT EVENTS: pt was agitated all night sedated p Ativan 2mg IVP  daughter at bedside    MEDICATIONS  (STANDING):  aspirin Suppository 300 milliGRAM(s) Rectal daily  dextrose 5% + sodium chloride 0.9%. 1000 milliLiter(s) (60 mL/Hr) IV Continuous <Continuous>  dextrose 5%. 1000 milliLiter(s) (50 mL/Hr) IV Continuous <Continuous>  dextrose 50% Injectable 12.5 Gram(s) IV Push once  dextrose 50% Injectable 25 Gram(s) IV Push once  dextrose 50% Injectable 25 Gram(s) IV Push once  heparin  Injectable 5000 Unit(s) SubCutaneous every 8 hours  lisinopril 10 milliGRAM(s) Oral daily  metoprolol tartrate 25 milliGRAM(s) Oral two times a day    MEDICATIONS  (PRN):  dextrose 40% Gel 15 Gram(s) Oral once PRN Blood Glucose LESS THAN 70 milliGRAM(s)/deciLiter  glucagon  Injectable 1 milliGRAM(s) IntraMuscular once PRN Glucose <70 milliGRAM(s)/deciLiter      Allergies    No Known Allergies    Intolerances        REVIEW OF SYSTEMS:        Vital Signs Last 24 Hrs  T(C): 36.8 (02 Jun 2019 05:11), Max: 37 (01 Jun 2019 11:10)  T(F): 98.2 (02 Jun 2019 05:11), Max: 98.6 (01 Jun 2019 11:10)  HR: 67 (02 Jun 2019 05:11) (67 - 94)  BP: 185/60 (02 Jun 2019 05:11) (134/74 - 185/60)  BP(mean): --  RR: 19 (02 Jun 2019 05:11) (17 - 19)  SpO2: 93% (02 Jun 2019 05:11) (93% - 99%)    PHYSICAL EXAM:  general       HEENT wnl  CHEST/LUNG: b/l clear  HEART: Regular rate and rhythm  ABDOMEN: Soft, Nontender, Nondistended; Bowel sounds present  EXTREMITIES:  2+ Peripheral Pulses,   LYMPH: No lymphadenopathy noted  SKIN: No rashes or lesions    LABS:                        9.9    5.45  )-----------( 224      ( 02 Jun 2019 07:29 )             30.7     06-02    142  |  109<H>  |  13  ----------------------------<  114<H>  3.3<L>   |  24  |  0.85    Ca    9.0      02 Jun 2019 07:29          CAPILLARY BLOOD GLUCOSE      POCT Blood Glucose.: 102 mg/dL (02 Jun 2019 06:36)  POCT Blood Glucose.: 122 mg/dL (02 Jun 2019 00:47)  POCT Blood Glucose.: 100 mg/dL (01 Jun 2019 22:33)  POCT Blood Glucose.: 131 mg/dL (01 Jun 2019 16:27)  POCT Blood Glucose.: 153 mg/dL (01 Jun 2019 11:18)      CARDIAC MARKERS ( 31 May 2019 15:04 )  .544 ng/mL / x     / 363 U/L / x     / 4.0 ng/mL      Hemoglobin A1C, Whole Blood: 5.4 % (05-31 @ 11:32)    Cholesterol, Serum: 202 mg/dL (05-31 @ 11:37)  HDL Cholesterol, Serum: 94 mg/dL (05-31 @ 11:37)  Triglycerides, Serum: 42 mg/dL (05-31 @ 11:37)      RADIOLOGY & ADDITIONAL TESTS:    Imaging Personally Reviewed:  [y ] YES  [ ] NO    Consultant(s) Notes Reviewed:  [y ] YES  [ ] NO    Care Discussed with Consultants/Other Providers [ ] YES  [ ] NO    PROBLEMS:  CEREBRAL VASCULAR ACCIDENT,ELEVATED TROPONIN LEVEL ONE  WEAKNESS  CVA (cerebral vascular accident)  Preventive measure  Rectal cancer  Elevated troponin I level  Cerebrovascular accident (CVA), unspecified mechanism  Acute psychosis      Care discussed with family,         [ x ]   yes  [  ]  No long discussion c daughter at bedside    imp:    stable[ ]    unstable[x  ]     improving [   ]       unchanged  [  ]                Plans:  Ativan 1mg IVP q 6 h prn agitation  Psychiatry consult  neurology f/u

## 2019-06-03 DIAGNOSIS — F05 DELIRIUM DUE TO KNOWN PHYSIOLOGICAL CONDITION: ICD-10-CM

## 2019-06-03 LAB
ANION GAP SERPL CALC-SCNC: 9 MMOL/L — SIGNIFICANT CHANGE UP (ref 5–17)
BUN SERPL-MCNC: 8 MG/DL — SIGNIFICANT CHANGE UP (ref 7–23)
CALCIUM SERPL-MCNC: 8.6 MG/DL — SIGNIFICANT CHANGE UP (ref 8.5–10.1)
CHLORIDE SERPL-SCNC: 113 MMOL/L — HIGH (ref 96–108)
CO2 SERPL-SCNC: 22 MMOL/L — SIGNIFICANT CHANGE UP (ref 22–31)
CREAT SERPL-MCNC: 0.91 MG/DL — SIGNIFICANT CHANGE UP (ref 0.5–1.3)
GLUCOSE BLDC GLUCOMTR-MCNC: 100 MG/DL — HIGH (ref 70–99)
GLUCOSE BLDC GLUCOMTR-MCNC: 105 MG/DL — HIGH (ref 70–99)
GLUCOSE BLDC GLUCOMTR-MCNC: 113 MG/DL — HIGH (ref 70–99)
GLUCOSE BLDC GLUCOMTR-MCNC: 91 MG/DL — SIGNIFICANT CHANGE UP (ref 70–99)
GLUCOSE BLDC GLUCOMTR-MCNC: 97 MG/DL — SIGNIFICANT CHANGE UP (ref 70–99)
GLUCOSE SERPL-MCNC: 106 MG/DL — HIGH (ref 70–99)
HCT VFR BLD CALC: 31.3 % — LOW (ref 34.5–45)
HGB BLD-MCNC: 10.1 G/DL — LOW (ref 11.5–15.5)
MAGNESIUM SERPL-MCNC: 1.7 MG/DL — SIGNIFICANT CHANGE UP (ref 1.6–2.6)
MCHC RBC-ENTMCNC: 29 PG — SIGNIFICANT CHANGE UP (ref 27–34)
MCHC RBC-ENTMCNC: 32.3 GM/DL — SIGNIFICANT CHANGE UP (ref 32–36)
MCV RBC AUTO: 89.9 FL — SIGNIFICANT CHANGE UP (ref 80–100)
NRBC # BLD: 0 /100 WBCS — SIGNIFICANT CHANGE UP (ref 0–0)
PLATELET # BLD AUTO: 246 K/UL — SIGNIFICANT CHANGE UP (ref 150–400)
POTASSIUM SERPL-MCNC: 3.5 MMOL/L — SIGNIFICANT CHANGE UP (ref 3.5–5.3)
POTASSIUM SERPL-SCNC: 3.5 MMOL/L — SIGNIFICANT CHANGE UP (ref 3.5–5.3)
RBC # BLD: 3.48 M/UL — LOW (ref 3.8–5.2)
RBC # FLD: 14.8 % — HIGH (ref 10.3–14.5)
SODIUM SERPL-SCNC: 144 MMOL/L — SIGNIFICANT CHANGE UP (ref 135–145)
WBC # BLD: 6.44 K/UL — SIGNIFICANT CHANGE UP (ref 3.8–10.5)
WBC # FLD AUTO: 6.44 K/UL — SIGNIFICANT CHANGE UP (ref 3.8–10.5)

## 2019-06-03 PROCEDURE — 90792 PSYCH DIAG EVAL W/MED SRVCS: CPT

## 2019-06-03 RX ORDER — ACETAMINOPHEN 500 MG
650 TABLET ORAL EVERY 6 HOURS
Refills: 0 | Status: DISCONTINUED | OUTPATIENT
Start: 2019-06-03 | End: 2019-06-17

## 2019-06-03 RX ORDER — MIRTAZAPINE 45 MG/1
7.5 TABLET, ORALLY DISINTEGRATING ORAL AT BEDTIME
Refills: 0 | Status: DISCONTINUED | OUTPATIENT
Start: 2019-06-03 | End: 2019-06-05

## 2019-06-03 RX ORDER — METOPROLOL TARTRATE 50 MG
5 TABLET ORAL ONCE
Refills: 0 | Status: COMPLETED | OUTPATIENT
Start: 2019-06-03 | End: 2019-06-03

## 2019-06-03 RX ADMIN — Medication 1 MILLIGRAM(S): at 05:32

## 2019-06-03 RX ADMIN — HEPARIN SODIUM 5000 UNIT(S): 5000 INJECTION INTRAVENOUS; SUBCUTANEOUS at 05:31

## 2019-06-03 RX ADMIN — Medication 5 MILLIGRAM(S): at 05:31

## 2019-06-03 RX ADMIN — Medication 650 MILLIGRAM(S): at 05:50

## 2019-06-03 RX ADMIN — Medication 650 MILLIGRAM(S): at 04:50

## 2019-06-03 RX ADMIN — Medication 300 MILLIGRAM(S): at 11:47

## 2019-06-03 RX ADMIN — Medication 1 MILLIGRAM(S): at 14:58

## 2019-06-03 RX ADMIN — HEPARIN SODIUM 5000 UNIT(S): 5000 INJECTION INTRAVENOUS; SUBCUTANEOUS at 22:08

## 2019-06-03 RX ADMIN — SODIUM CHLORIDE 60 MILLILITER(S): 9 INJECTION, SOLUTION INTRAVENOUS at 11:48

## 2019-06-03 RX ADMIN — Medication 1 MILLIGRAM(S): at 20:55

## 2019-06-03 RX ADMIN — HEPARIN SODIUM 5000 UNIT(S): 5000 INJECTION INTRAVENOUS; SUBCUTANEOUS at 13:11

## 2019-06-03 NOTE — BEHAVIORAL HEALTH ASSESSMENT NOTE - NSBHCHARTREVIEWIMAGING_PSY_A_CORE FT
MRI of head diffusion restriction in the left MCA distribution. CTA imaging may be considered for further assessment. 2)  chronic ischemic changes also noted in both hemispheres with volume loss.  CT head Hyperdensity in the left sylvian inferior division middle cerebral artery M2 branches reflective of thrombus. Evidence of acute infarct involving   the left posterior insula and high left parietal cortex.

## 2019-06-03 NOTE — DIETITIAN INITIAL EVALUATION ADULT. - OTHER INFO
PT seen for nutrition consult, decreased PO intake. Pt with aphagia and polypharmcy unable to communicate. Daughter at bedside. Per daughter. Pt live alone and independent with ADL PTA. PT did not have any N/V with colostomy with no difficulties. Pt s/p CVA; SLP 05-31 dx Pureed with nectar thick liquids. Per RN & daughter pt with agitation unable to feed. Discussed with daughter nutrition support. Daughter agreed with MD's plan for PEG placement for supplemental feedings.

## 2019-06-03 NOTE — DIETITIAN INITIAL EVALUATION ADULT. - PERTINENT LABORATORY DATA
06-03 Na 144 mmol/L Glu 106 mg/dL<H> K+ 3.5 mmol/L Cr  0.91 mg/dL BUN 8 mg/dL Phos n/a   Alb n/a   PAB n/a   Hgb 10.1 g/dL<L> Hct 31.3 %<L>

## 2019-06-03 NOTE — BEHAVIORAL HEALTH ASSESSMENT NOTE - PRIMARY DX
Delirium due to another medical condition, acute, hyperactive Cerebrovascular accident (CVA), unspecified mechanism

## 2019-06-03 NOTE — DIETITIAN INITIAL EVALUATION ADULT. - PERTINENT MEDS FT
MEDICATIONS  (STANDING):  aspirin Suppository 300 milliGRAM(s) Rectal daily  dextrose 5% + sodium chloride 0.9%. 1000 milliLiter(s) (60 mL/Hr) IV Continuous <Continuous>  dextrose 5%. 1000 milliLiter(s) (50 mL/Hr) IV Continuous <Continuous>  dextrose 50% Injectable 12.5 Gram(s) IV Push once  dextrose 50% Injectable 25 Gram(s) IV Push once  dextrose 50% Injectable 25 Gram(s) IV Push once  heparin  Injectable 5000 Unit(s) SubCutaneous every 8 hours  lisinopril 10 milliGRAM(s) Oral daily  metoprolol tartrate 25 milliGRAM(s) Oral two times a day    MEDICATIONS  (PRN):  acetaminophen  Suppository .. 650 milliGRAM(s) Rectal every 6 hours PRN Temp greater or equal to 38C (100.4F), Mild Pain (1 - 3)  dextrose 40% Gel 15 Gram(s) Oral once PRN Blood Glucose LESS THAN 70 milliGRAM(s)/deciLiter  glucagon  Injectable 1 milliGRAM(s) IntraMuscular once PRN Glucose <70 milliGRAM(s)/deciLiter  LORazepam   Injectable 1 milliGRAM(s) IV Push every 6 hours PRN Agitation

## 2019-06-03 NOTE — BEHAVIORAL HEALTH ASSESSMENT NOTE - SUMMARY
73 yo female with hyperactive delirium secondary to acute CVA.  PLAN:  - tolerating Ativan IVP w/ reduction in agitation when used  - continue Ativan 1mg IVP q6hrs PRN for severe agitation  - Ativan 0.5mg PO q6hrs prn for anxiety / mild agitation   - Remeorn 7.5mg PO qhs prn for sleep

## 2019-06-03 NOTE — PROGRESS NOTE ADULT - SUBJECTIVE AND OBJECTIVE BOX
VARIABLE BLOOD PRESSURE  TELEMETRY: NORMAL SINUS RHYTHM  RIGHT SIDED PLEGIA  NO JVD  COARSE BREATH SOUNDS  DISTANT HEART SOUNDS  ABDOMEN SOFT, NONTENDER, NO DISTENSION  NO CLUBBING CYANOSIS OR EDEMA    STABLE RHYTHM  ELEVATED BLOOD PRESSURE ; MGT AS PER NEUROLOGY STATUS  POST CVA    SAVANNAH POOL MD, FACC

## 2019-06-03 NOTE — DIETITIAN INITIAL EVALUATION ADULT. - NS AS NUTRI INTERV ENTERAL NUTRITION
Volume/Schedule/Route/Composition/Insert enteral feeding tube/Concentration/Rate/If PO not medically feasible consider EN via PEG: Jevity 1.2; total volume 1320mL/24hr; Goal Rate 55mL/hr.; Start at rate 30mL increase 10mL q8hr until goal rate reached. (Goal provides 1580kcal,  80 grams of pro, 1100mL free H2O) + Provided 150mL H2O q6h

## 2019-06-03 NOTE — DIETITIAN INITIAL EVALUATION ADULT. - ETIOLOGY
altered GI status- s/p CVA  dysphagia & altered mental status in the context of confusion and agitation

## 2019-06-03 NOTE — PROGRESS NOTE ADULT - SUBJECTIVE AND OBJECTIVE BOX
Patient is a 74y old  Female who presents with a chief complaint of CVA (03 Jun 2019 07:42)      INTERVAL HPI/OVERNIGHT EVENTS: afebrile. less agitated, confused, garbled speech    MEDICATIONS  (STANDING):  aspirin Suppository 300 milliGRAM(s) Rectal daily  dextrose 5% + sodium chloride 0.9%. 1000 milliLiter(s) (60 mL/Hr) IV Continuous <Continuous>  dextrose 5%. 1000 milliLiter(s) (50 mL/Hr) IV Continuous <Continuous>  dextrose 50% Injectable 12.5 Gram(s) IV Push once  dextrose 50% Injectable 25 Gram(s) IV Push once  dextrose 50% Injectable 25 Gram(s) IV Push once  heparin  Injectable 5000 Unit(s) SubCutaneous every 8 hours  lisinopril 10 milliGRAM(s) Oral daily  metoprolol tartrate 25 milliGRAM(s) Oral two times a day    MEDICATIONS  (PRN):  acetaminophen  Suppository .. 650 milliGRAM(s) Rectal every 6 hours PRN Temp greater or equal to 38C (100.4F), Mild Pain (1 - 3)  dextrose 40% Gel 15 Gram(s) Oral once PRN Blood Glucose LESS THAN 70 milliGRAM(s)/deciLiter  glucagon  Injectable 1 milliGRAM(s) IntraMuscular once PRN Glucose <70 milliGRAM(s)/deciLiter  LORazepam   Injectable 1 milliGRAM(s) IV Push every 6 hours PRN Agitation      Allergies    No Known Allergies    Intolerances        REVIEW OF SYSTEMS:              Vital Signs Last 24 Hrs  T(C): 36.7 (03 Jun 2019 05:15), Max: 37.1 (02 Jun 2019 11:41)  T(F): 98 (03 Jun 2019 05:15), Max: 98.8 (02 Jun 2019 23:54)  HR: 78 (03 Jun 2019 05:15) (75 - 81)  BP: 154/93 (03 Jun 2019 05:15) (154/93 - 188/68)  BP(mean): --  RR: 18 (03 Jun 2019 05:15) (18 - 18)  SpO2: 98% (03 Jun 2019 05:15) (96% - 98%)    PHYSICAL EXAM:  general       HEENT wnl  CHEST/LUNG: Clear to percussion bilaterally; No rales, rhonchi, wheezing, or rubs  HEART: Regular rate and rhythm; No murmurs, rubs, or gallops  ABDOMEN: Soft, Nontender, Nondistended; Bowel sounds present  EXTREMITIES:  R hand contracted  LYMPH: No lymphadenopathy noted  SKIN: No rashes or lesions    LABS:                        10.1   6.44  )-----------( 246      ( 03 Jun 2019 06:59 )             31.3     06-03    144  |  113<H>  |  8   ----------------------------<  106<H>  3.5   |  22  |  0.91    Ca    8.6      03 Jun 2019 06:59  Mg     1.7     06-03          CAPILLARY BLOOD GLUCOSE      POCT Blood Glucose.: 105 mg/dL (03 Jun 2019 03:17)  POCT Blood Glucose.: 95 mg/dL (02 Jun 2019 21:51)  POCT Blood Glucose.: 118 mg/dL (02 Jun 2019 15:56)  POCT Blood Glucose.: 107 mg/dL (02 Jun 2019 11:39)          Hemoglobin A1C, Whole Blood: 5.4 % (05-31 @ 11:32)    Cholesterol, Serum: 202 mg/dL (05-31 @ 11:37)  HDL Cholesterol, Serum: 94 mg/dL (05-31 @ 11:37)  Triglycerides, Serum: 42 mg/dL (05-31 @ 11:37)      RADIOLOGY & ADDITIONAL TESTS:    Imaging Personally Reviewed:  [y ] YES  [ ] NO    Consultant(s) Notes Reviewed:  [y ] YES  [ ] NO    Care Discussed with Consultants/Other Providers [v YES  [ ] NO    PROBLEMS:  CEREBRAL VASCULAR ACCIDENT,ELEVATED TROPONIN LEVEL ONE  WEAKNESS  CVA (cerebral vascular accident)  Preventive measure  Rectal cancer  Elevated troponin I level  Cerebrovascular accident (CVA), unspecified mechanism  Agitation psychosis  Dysphagia      Care discussed with family,         [  ]   yes  [  ]  No    imp:    stable[ ]    unstable[ x ]     improving [   ]       unchanged  [  ]                Plans:  Continue present plans  [ x ]  Neurology f/u  Prognosis guarded  On ASA heparin Statin  Ativan prn agitation  Psych consult called

## 2019-06-03 NOTE — BEHAVIORAL HEALTH ASSESSMENT NOTE - RISK ASSESSMENT
low risk for intentional, planned out and executed harm to self or others given advanced age, physical frailty and confusion. More likely to unintentionally/accidentally lash out at caretakers during ADL assistance or hurt self by trying to climb out of bed/pulls lines etc secondary to her confused state.

## 2019-06-03 NOTE — BEHAVIORAL HEALTH ASSESSMENT NOTE - DESCRIPTION
acute infarct involving the left posterior insula and high left parietal cortexhistory of rectal cancer w/ resection in 2014, + colostomy bag, required surgery and chemotherapy via infusa port (port removed March '13); hx of UTI, hx of pneumonia; Colonoscopy via Ostomy 2/4/2019; hx of Shingles (herpes zoster) polyneuropathy

## 2019-06-03 NOTE — BEHAVIORAL HEALTH ASSESSMENT NOTE - HPI (INCLUDE ILLNESS QUALITY, SEVERITY, DURATION, TIMING, CONTEXT, MODIFYING FACTORS, ASSOCIATED SIGNS AND SYMPTOMS)
75yo AAF, , lives alone, admitted on 5/31/19 for acute CVA from home. (Daughter states she spoke with Patient on the phone at noon and Pt's speech was different. Daughter then called back several times, got no answer, and went over to Pt's house and found her laying on ground). Patient has been agitated in the hospital.     Patient is a poor historian - dozing off in bed s/p Ativan PRN and unable to provide history or engage in conversation. As per nursing staff, she has been yelling, pulling lines, trying to climb out of bed intermittently.

## 2019-06-04 LAB
GLUCOSE BLDC GLUCOMTR-MCNC: 101 MG/DL — HIGH (ref 70–99)
GLUCOSE BLDC GLUCOMTR-MCNC: 119 MG/DL — HIGH (ref 70–99)
GLUCOSE BLDC GLUCOMTR-MCNC: 94 MG/DL — SIGNIFICANT CHANGE UP (ref 70–99)
GLUCOSE BLDC GLUCOMTR-MCNC: 99 MG/DL — SIGNIFICANT CHANGE UP (ref 70–99)

## 2019-06-04 PROCEDURE — 99233 SBSQ HOSP IP/OBS HIGH 50: CPT

## 2019-06-04 RX ORDER — METOPROLOL TARTRATE 50 MG
5 TABLET ORAL ONCE
Refills: 0 | Status: COMPLETED | OUTPATIENT
Start: 2019-06-04 | End: 2019-06-04

## 2019-06-04 RX ORDER — HALOPERIDOL DECANOATE 100 MG/ML
2.5 INJECTION INTRAMUSCULAR ONCE
Refills: 0 | Status: COMPLETED | OUTPATIENT
Start: 2019-06-04 | End: 2019-06-04

## 2019-06-04 RX ORDER — LISINOPRIL 2.5 MG/1
20 TABLET ORAL DAILY
Refills: 0 | Status: DISCONTINUED | OUTPATIENT
Start: 2019-06-04 | End: 2019-06-14

## 2019-06-04 RX ORDER — HYDRALAZINE HCL 50 MG
25 TABLET ORAL THREE TIMES A DAY
Refills: 0 | Status: DISCONTINUED | OUTPATIENT
Start: 2019-06-04 | End: 2019-06-18

## 2019-06-04 RX ADMIN — Medication 0.5 MILLIGRAM(S): at 16:23

## 2019-06-04 RX ADMIN — HEPARIN SODIUM 5000 UNIT(S): 5000 INJECTION INTRAVENOUS; SUBCUTANEOUS at 13:06

## 2019-06-04 RX ADMIN — HALOPERIDOL DECANOATE 2.5 MILLIGRAM(S): 100 INJECTION INTRAMUSCULAR at 19:05

## 2019-06-04 RX ADMIN — HEPARIN SODIUM 5000 UNIT(S): 5000 INJECTION INTRAVENOUS; SUBCUTANEOUS at 21:56

## 2019-06-04 RX ADMIN — SODIUM CHLORIDE 60 MILLILITER(S): 9 INJECTION, SOLUTION INTRAVENOUS at 05:41

## 2019-06-04 RX ADMIN — Medication 5 MILLIGRAM(S): at 05:42

## 2019-06-04 RX ADMIN — SODIUM CHLORIDE 60 MILLILITER(S): 9 INJECTION, SOLUTION INTRAVENOUS at 02:39

## 2019-06-04 RX ADMIN — Medication 25 MILLIGRAM(S): at 13:57

## 2019-06-04 RX ADMIN — Medication 300 MILLIGRAM(S): at 13:05

## 2019-06-04 RX ADMIN — SODIUM CHLORIDE 60 MILLILITER(S): 9 INJECTION, SOLUTION INTRAVENOUS at 22:17

## 2019-06-04 RX ADMIN — Medication 0.5 MILLIGRAM(S): at 23:21

## 2019-06-04 RX ADMIN — HEPARIN SODIUM 5000 UNIT(S): 5000 INJECTION INTRAVENOUS; SUBCUTANEOUS at 05:42

## 2019-06-04 RX ADMIN — Medication 25 MILLIGRAM(S): at 17:30

## 2019-06-04 RX ADMIN — Medication 1 MILLIGRAM(S): at 02:27

## 2019-06-04 NOTE — PROGRESS NOTE ADULT - SUBJECTIVE AND OBJECTIVE BOX
Patient is a 74y old  Female who presents with a chief complaint of CVA (04 Jun 2019 11:32)      INTERVAL HPI/OVERNIGHT EVENTS: psych eval noted  poor oral intake  Cleared for pureed diet c nectar fluids consistency  BP >    MEDICATIONS  (STANDING):  aspirin Suppository 300 milliGRAM(s) Rectal daily  dextrose 5% + sodium chloride 0.9%. 1000 milliLiter(s) (60 mL/Hr) IV Continuous <Continuous>  dextrose 5%. 1000 milliLiter(s) (50 mL/Hr) IV Continuous <Continuous>  dextrose 50% Injectable 12.5 Gram(s) IV Push once  dextrose 50% Injectable 25 Gram(s) IV Push once  dextrose 50% Injectable 25 Gram(s) IV Push once  heparin  Injectable 5000 Unit(s) SubCutaneous every 8 hours  hydrALAZINE 25 milliGRAM(s) Oral three times a day  lisinopril 20 milliGRAM(s) Oral daily  metoprolol tartrate 25 milliGRAM(s) Oral two times a day    MEDICATIONS  (PRN):  acetaminophen  Suppository .. 650 milliGRAM(s) Rectal every 6 hours PRN Temp greater or equal to 38C (100.4F), Mild Pain (1 - 3)  dextrose 40% Gel 15 Gram(s) Oral once PRN Blood Glucose LESS THAN 70 milliGRAM(s)/deciLiter  glucagon  Injectable 1 milliGRAM(s) IntraMuscular once PRN Glucose <70 milliGRAM(s)/deciLiter  LORazepam     Tablet 0.5 milliGRAM(s) Oral every 6 hours PRN anxiety / mild agitation  LORazepam   Injectable 1 milliGRAM(s) IV Push every 6 hours PRN severe agitation  mirtazapine 7.5 milliGRAM(s) Oral at bedtime PRN insomnia      Allergies    No Known Allergies    Intolerances        REVIEW OF SYSTEMS:        HEENT - wnl  RESPIRATORY: No cough, wheezing, chills or hemoptysis; No shortness of breath  CARDIOVASCULAR: No chest pain, palpitations, dizziness, or leg swelling  GASTROINTESTINAL: No abdominal or epigastric pain. No nausea, vomiting, or hematemesis; No diarrhea or constipation. No melena or hematochezia.  GENITOURINARY: No dysuria, frequency, hematuria, or incontinence  SKIN: No itching, burning, rashes, or lesions   MUSCULOSKELETAL: No joint pain or swelling; No muscle, back, or extremity pain  PSYCHIATRIC: No depression, anxiety, mood swings, or difficulty sleeping        Vital Signs Last 24 Hrs  T(C): 36.2 (04 Jun 2019 10:58), Max: 36.6 (03 Jun 2019 17:42)  T(F): 97.2 (04 Jun 2019 10:58), Max: 97.9 (03 Jun 2019 17:42)  HR: 66 (04 Jun 2019 10:58) (66 - 86)  BP: 152/66 (04 Jun 2019 10:58) (141/59 - 186/76)  BP(mean): 82 (03 Jun 2019 17:42) (82 - 82)  RR: 18 (04 Jun 2019 10:58) (17 - 18)  SpO2: 96% (04 Jun 2019 10:58) (95% - 96%)    PHYSICAL EXAM:  general       HEENT wnl  CHEST/LUNG: Clear to percussion bilaterally; No rales, rhonchi, wheezing, or rubs  HEART: Regular rate and rhythm; No murmurs, rubs, or gallops  ABDOMEN: Soft, Nontender, Nondistended; Bowel sounds present  EXTREMITIES:  2+ Peripheral Pulses, No clubbing, cyanosis, or edema  LYMPH: No lymphadenopathy noted  SKIN: No rashes or lesions    LABS:                        10.1   6.44  )-----------( 246      ( 03 Jun 2019 06:59 )             31.3     06-03    144  |  113<H>  |  8   ----------------------------<  106<H>  3.5   |  22  |  0.91    Ca    8.6      03 Jun 2019 06:59  Mg     1.7     06-03          CAPILLARY BLOOD GLUCOSE      POCT Blood Glucose.: 119 mg/dL (04 Jun 2019 11:01)  POCT Blood Glucose.: 94 mg/dL (04 Jun 2019 04:51)  POCT Blood Glucose.: 113 mg/dL (03 Jun 2019 22:12)  POCT Blood Glucose.: 91 mg/dL (03 Jun 2019 17:05)  POCT Blood Glucose.: 97 mg/dL (03 Jun 2019 16:40)          Hemoglobin A1C, Whole Blood: 5.4 % (05-31 @ 11:32)    Cholesterol, Serum: 202 mg/dL (05-31 @ 11:37)  HDL Cholesterol, Serum: 94 mg/dL (05-31 @ 11:37)  Triglycerides, Serum: 42 mg/dL (05-31 @ 11:37)      RADIOLOGY & ADDITIONAL TESTS:    Imaging Personally Reviewed:  [y ] YES  [ ] NO    Consultant(s) Notes Reviewed:  [y ] YES  [ ] NO    Care Discussed with Consultants/Other Providers [ ] YES  [ ] NO    PROBLEMS:  CEREBRAL VASCULAR ACCIDENT,ELEVATED TROPONIN LEVEL ONE  WEAKNESS  CVA (cerebral vascular accident)  Delirium due to another medical condition, acute, hyperactive  Preventive measure  Rectal cancer  Elevated troponin I level  Cerebrovascular accident (CVA), unspecified mechanism  Delirium  Dysphagia      Care discussed with family,         [  ]   yes  [  ]  No    imp:    stable[ ]    unstable[ x ]     improving [   ]       unchanged  [  ]                Plans:  > Lisinopril to 20 mg qd  Start Hydralazin 25 mg tid hold SBP < 140  Ativan and REmeron started  Discussed c daughter possibility of NG tube feeding

## 2019-06-04 NOTE — PROGRESS NOTE BEHAVIORAL HEALTH - NSBHFUPINTERVALHXFT_PSY_A_CORE
Patient is sleeping deeply in her bed s/p Ativan IVP for agitation. Looks comfortable and not in any distress. As per staff, she is tolerating th Ativan and it is helping her agitation.

## 2019-06-04 NOTE — PROGRESS NOTE BEHAVIORAL HEALTH - SUMMARY
75 yo female with hyperactive delirium secondary to acute CVA who seems to be oversedated from Ativan 1mg IVP.  NEW RECOMMENDATIONS  - trying lower dose of PRN Ativan at 0.5mg IVP q6hrs PRN for severe agitation to minimize excessive daytime sedation with goal for patient to be awake, alert, able to engage with her environment

## 2019-06-04 NOTE — PROGRESS NOTE ADULT - SUBJECTIVE AND OBJECTIVE BOX
HEMODYNAMICALLY STABLE  TELEMETRY: DC  NO CHANGE CLINICALLY  EUVOLEMIC     STABLE CARDIOVASCULAR STATUS; CONTINUING WITH PRESENT MANAGEMENT.   SUPPORTIVE CARE  NO CV CONTRA INDICATION TO PEG WITH ANESTHESIA  FAMILY PRESENT AT BEDSIDE

## 2019-06-05 LAB
ANION GAP SERPL CALC-SCNC: 10 MMOL/L — SIGNIFICANT CHANGE UP (ref 5–17)
BUN SERPL-MCNC: 5 MG/DL — LOW (ref 7–23)
CALCIUM SERPL-MCNC: 9.1 MG/DL — SIGNIFICANT CHANGE UP (ref 8.5–10.1)
CHLORIDE SERPL-SCNC: 110 MMOL/L — HIGH (ref 96–108)
CO2 SERPL-SCNC: 21 MMOL/L — LOW (ref 22–31)
CREAT SERPL-MCNC: 0.67 MG/DL — SIGNIFICANT CHANGE UP (ref 0.5–1.3)
GLUCOSE BLDC GLUCOMTR-MCNC: 118 MG/DL — HIGH (ref 70–99)
GLUCOSE SERPL-MCNC: 110 MG/DL — HIGH (ref 70–99)
POTASSIUM SERPL-MCNC: 3 MMOL/L — LOW (ref 3.5–5.3)
POTASSIUM SERPL-SCNC: 3 MMOL/L — LOW (ref 3.5–5.3)
SODIUM SERPL-SCNC: 141 MMOL/L — SIGNIFICANT CHANGE UP (ref 135–145)

## 2019-06-05 PROCEDURE — 99233 SBSQ HOSP IP/OBS HIGH 50: CPT

## 2019-06-05 PROCEDURE — 71045 X-RAY EXAM CHEST 1 VIEW: CPT | Mod: 26

## 2019-06-05 RX ORDER — MIRTAZAPINE 45 MG/1
7.5 TABLET, ORALLY DISINTEGRATING ORAL AT BEDTIME
Refills: 0 | Status: DISCONTINUED | OUTPATIENT
Start: 2019-06-05 | End: 2019-06-18

## 2019-06-05 RX ORDER — POTASSIUM CHLORIDE 20 MEQ
20 PACKET (EA) ORAL
Refills: 0 | Status: COMPLETED | OUTPATIENT
Start: 2019-06-05 | End: 2019-06-05

## 2019-06-05 RX ORDER — SODIUM CHLORIDE 9 MG/ML
1000 INJECTION, SOLUTION INTRAVENOUS
Refills: 0 | Status: DISCONTINUED | OUTPATIENT
Start: 2019-06-05 | End: 2019-06-10

## 2019-06-05 RX ORDER — HYDRALAZINE HCL 50 MG
10 TABLET ORAL ONCE
Refills: 0 | Status: COMPLETED | OUTPATIENT
Start: 2019-06-05 | End: 2019-06-05

## 2019-06-05 RX ORDER — HYDRALAZINE HCL 50 MG
10 TABLET ORAL EVERY 6 HOURS
Refills: 0 | Status: DISCONTINUED | OUTPATIENT
Start: 2019-06-05 | End: 2019-06-14

## 2019-06-05 RX ADMIN — Medication 10 MILLIGRAM(S): at 01:03

## 2019-06-05 RX ADMIN — Medication 300 MILLIGRAM(S): at 11:45

## 2019-06-05 RX ADMIN — HEPARIN SODIUM 5000 UNIT(S): 5000 INJECTION INTRAVENOUS; SUBCUTANEOUS at 06:11

## 2019-06-05 RX ADMIN — HEPARIN SODIUM 5000 UNIT(S): 5000 INJECTION INTRAVENOUS; SUBCUTANEOUS at 21:57

## 2019-06-05 RX ADMIN — HEPARIN SODIUM 5000 UNIT(S): 5000 INJECTION INTRAVENOUS; SUBCUTANEOUS at 13:02

## 2019-06-05 RX ADMIN — SODIUM CHLORIDE 60 MILLILITER(S): 9 INJECTION, SOLUTION INTRAVENOUS at 11:01

## 2019-06-05 RX ADMIN — Medication 10 MILLIGRAM(S): at 13:15

## 2019-06-05 RX ADMIN — Medication 10 MILLIGRAM(S): at 22:15

## 2019-06-05 RX ADMIN — Medication 20 MILLIEQUIVALENT(S): at 11:45

## 2019-06-05 RX ADMIN — Medication 20 MILLIEQUIVALENT(S): at 10:19

## 2019-06-05 RX ADMIN — Medication 0.5 MILLIGRAM(S): at 13:25

## 2019-06-05 NOTE — PROGRESS NOTE BEHAVIORAL HEALTH - OTHER
n/a - sleeping looks older than stated age dozing off deferred at this time moaning, yelling on and off n/a none unable to describe in some distress but does not seem to be in pain unable to ascertain at this time

## 2019-06-05 NOTE — PROGRESS NOTE ADULT - SUBJECTIVE AND OBJECTIVE BOX
Patient is a 74y old  Female who presents with a chief complaint of CVA (05 Jun 2019 07:47)      INTERVAL HPI/OVERNIGHT EVENTS: AA moving L arm, trying to speak. Not eating  Low grade fever    MEDICATIONS  (STANDING):  aspirin Suppository 300 milliGRAM(s) Rectal daily  dextrose 5% + sodium chloride 0.9%. 1000 milliLiter(s) (60 mL/Hr) IV Continuous <Continuous>  dextrose 5%. 1000 milliLiter(s) (50 mL/Hr) IV Continuous <Continuous>  dextrose 50% Injectable 12.5 Gram(s) IV Push once  dextrose 50% Injectable 25 Gram(s) IV Push once  dextrose 50% Injectable 25 Gram(s) IV Push once  heparin  Injectable 5000 Unit(s) SubCutaneous every 8 hours  hydrALAZINE 25 milliGRAM(s) Oral three times a day  lisinopril 20 milliGRAM(s) Oral daily  metoprolol tartrate 25 milliGRAM(s) Oral two times a day    MEDICATIONS  (PRN):  acetaminophen  Suppository .. 650 milliGRAM(s) Rectal every 6 hours PRN Temp greater or equal to 38C (100.4F), Mild Pain (1 - 3)  dextrose 40% Gel 15 Gram(s) Oral once PRN Blood Glucose LESS THAN 70 milliGRAM(s)/deciLiter  glucagon  Injectable 1 milliGRAM(s) IntraMuscular once PRN Glucose <70 milliGRAM(s)/deciLiter  LORazepam     Tablet 0.5 milliGRAM(s) Oral every 6 hours PRN anxiety / mild agitation  LORazepam   Injectable 0.5 milliGRAM(s) IV Push every 6 hours PRN severe agitation  mirtazapine 7.5 milliGRAM(s) Oral at bedtime PRN insomnia      Allergies    No Known Allergies    Intolerances        REVIEW OF SYSTEMS:            Vital Signs Last 24 Hrs  T(C): 37.6 (05 Jun 2019 05:15), Max: 37.9 (04 Jun 2019 23:45)  T(F): 99.6 (05 Jun 2019 05:15), Max: 100.3 (04 Jun 2019 23:45)  HR: 101 (05 Jun 2019 05:15) (66 - 104)  BP: 162/68 (05 Jun 2019 05:15) (142/75 - 186/66)  BP(mean): --  RR: 18 (05 Jun 2019 05:15) (18 - 18)  SpO2: 95% (05 Jun 2019 05:15) (95% - 97%)    PHYSICAL EXAM:  general       HEENT R eye edema , closed  CHEST/LUNG: Clear to percussion bilaterally; No rales, rhonchi, wheezing, or rubs  HEART: Regular rate and rhythm; No murmurs, rubs, or gallops  ABDOMEN: Soft, Nontender, Nondistended; Bowel sounds present  EXTREMITIES:  2+ Peripheral Pulses, No clubbing, cyanosis, or edema  LYMPH: No lymphadenopathy noted  SKIN: No rashes or lesions  Nate R hemiparesis  LABS:    06-05    141  |  110<H>  |  5<L>  ----------------------------<  110<H>  3.0<L>   |  21<L>  |  0.67    Ca    9.1      05 Jun 2019 08:07          CAPILLARY BLOOD GLUCOSE      POCT Blood Glucose.: 118 mg/dL (05 Jun 2019 07:32)  POCT Blood Glucose.: 101 mg/dL (04 Jun 2019 23:32)  POCT Blood Glucose.: 99 mg/dL (04 Jun 2019 17:02)  POCT Blood Glucose.: 119 mg/dL (04 Jun 2019 11:01)          Hemoglobin A1C, Whole Blood: 5.4 % (05-31 @ 11:32)    Cholesterol, Serum: 202 mg/dL (05-31 @ 11:37)  HDL Cholesterol, Serum: 94 mg/dL (05-31 @ 11:37)  Triglycerides, Serum: 42 mg/dL (05-31 @ 11:37)      RADIOLOGY & ADDITIONAL TESTS:    Imaging Personally Reviewed:  [y ] YES  [ ] NO    Consultant(s) Notes Reviewed:  [y ] YES  [ ] NO    Care Discussed with Consultants/Other Providers [ ] YES  [ ] NO    PROBLEMS:  CEREBRAL VASCULAR ACCIDENT,ELEVATED TROPONIN LEVEL ONE  WEAKNESS  CVA (cerebral vascular accident) L MCA  Delirium due to another medical condition, acute, hyperactive  Preventive measure  Rectal cancer  Elevated troponin I level  Cerebrovascular accident (CVA), unspecified mechanism  Fever  Dysphagia      Care discussed with family,         [x  ]   yes  [  ]  No    imp:    stable[ ]    unstable[  ]     improving [   ]       unchanged  [  ]                Plans:  CXR r/o aspiration PNA  PT OT  Consider NG feeding

## 2019-06-05 NOTE — PROGRESS NOTE BEHAVIORAL HEALTH - SUMMARY
75 yo female with hyperactive delirium secondary to acute CVA who seems to be oversedated from Ativan 1mg IVP.  NEW RECOMMENDATIONS  - start Ativan 0.5mg PO bid standing for agitation 73 yo female with hyperactive delirium secondary to acute CVA who seems to be oversedated from Ativan 1mg IVP.  NEW RECOMMENDATIONS  - start Ativan 0.5mg PO bid standing for agitation  - changed PRN Remeron 7.5mg PO qhs to standing

## 2019-06-05 NOTE — PROGRESS NOTE ADULT - SUBJECTIVE AND OBJECTIVE BOX
NO CHANGE CLINICALLY  HEMODYNAMICALLY STABLE  CARDIOVASCULAR STATUS STABLE; WILL SIGN OFF; PLEASE RECONSULT AS  NEEDED.

## 2019-06-05 NOTE — PROGRESS NOTE BEHAVIORAL HEALTH - NSBHFUPINTERVALHXFT_PSY_A_CORE
Patient is in a different room - awake but not alert, intermittently yelling. Unable to verbalize needs. Patient's daughter at bedside and says Patient has been screaming/yelling on/off for days. Psychoeducation regarding the need for PRNs explained. Patient is in a different room - awake but not alert, intermittently yelling. Unable to verbalize needs. Patient's daughter at bedside and says Patient has been screaming/yelling on/off for days. As per daughter, patient wants to go home. Psychoeducation regarding the need for PRNs explained.

## 2019-06-06 LAB
ANION GAP SERPL CALC-SCNC: 12 MMOL/L — SIGNIFICANT CHANGE UP (ref 5–17)
BUN SERPL-MCNC: 5 MG/DL — LOW (ref 7–23)
CALCIUM SERPL-MCNC: 9 MG/DL — SIGNIFICANT CHANGE UP (ref 8.5–10.1)
CHLORIDE SERPL-SCNC: 114 MMOL/L — HIGH (ref 96–108)
CO2 SERPL-SCNC: 20 MMOL/L — LOW (ref 22–31)
CREAT SERPL-MCNC: 0.91 MG/DL — SIGNIFICANT CHANGE UP (ref 0.5–1.3)
GLUCOSE BLDC GLUCOMTR-MCNC: 105 MG/DL — HIGH (ref 70–99)
GLUCOSE BLDC GLUCOMTR-MCNC: 113 MG/DL — HIGH (ref 70–99)
GLUCOSE BLDC GLUCOMTR-MCNC: 127 MG/DL — HIGH (ref 70–99)
GLUCOSE BLDC GLUCOMTR-MCNC: 151 MG/DL — HIGH (ref 70–99)
GLUCOSE SERPL-MCNC: 101 MG/DL — HIGH (ref 70–99)
POTASSIUM SERPL-MCNC: 3.2 MMOL/L — LOW (ref 3.5–5.3)
POTASSIUM SERPL-SCNC: 3.2 MMOL/L — LOW (ref 3.5–5.3)
SODIUM SERPL-SCNC: 146 MMOL/L — HIGH (ref 135–145)

## 2019-06-06 PROCEDURE — 74018 RADEX ABDOMEN 1 VIEW: CPT | Mod: 26

## 2019-06-06 PROCEDURE — 99232 SBSQ HOSP IP/OBS MODERATE 35: CPT

## 2019-06-06 RX ORDER — POTASSIUM CHLORIDE 20 MEQ
10 PACKET (EA) ORAL
Refills: 0 | Status: COMPLETED | OUTPATIENT
Start: 2019-06-06 | End: 2019-06-06

## 2019-06-06 RX ADMIN — HEPARIN SODIUM 5000 UNIT(S): 5000 INJECTION INTRAVENOUS; SUBCUTANEOUS at 21:35

## 2019-06-06 RX ADMIN — HEPARIN SODIUM 5000 UNIT(S): 5000 INJECTION INTRAVENOUS; SUBCUTANEOUS at 06:29

## 2019-06-06 RX ADMIN — Medication 100 MILLIEQUIVALENT(S): at 12:55

## 2019-06-06 RX ADMIN — Medication 0.5 MILLIGRAM(S): at 13:02

## 2019-06-06 RX ADMIN — SODIUM CHLORIDE 60 MILLILITER(S): 9 INJECTION, SOLUTION INTRAVENOUS at 06:29

## 2019-06-06 RX ADMIN — SODIUM CHLORIDE 60 MILLILITER(S): 9 INJECTION, SOLUTION INTRAVENOUS at 23:02

## 2019-06-06 RX ADMIN — Medication 25 MILLIGRAM(S): at 17:45

## 2019-06-06 RX ADMIN — Medication 100 MILLIEQUIVALENT(S): at 14:20

## 2019-06-06 RX ADMIN — Medication 650 MILLIGRAM(S): at 07:19

## 2019-06-06 RX ADMIN — MIRTAZAPINE 7.5 MILLIGRAM(S): 45 TABLET, ORALLY DISINTEGRATING ORAL at 21:34

## 2019-06-06 RX ADMIN — Medication 25 MILLIGRAM(S): at 21:36

## 2019-06-06 RX ADMIN — Medication 650 MILLIGRAM(S): at 06:33

## 2019-06-06 RX ADMIN — HEPARIN SODIUM 5000 UNIT(S): 5000 INJECTION INTRAVENOUS; SUBCUTANEOUS at 13:02

## 2019-06-06 RX ADMIN — Medication 100 MILLIEQUIVALENT(S): at 11:40

## 2019-06-06 RX ADMIN — Medication 0.5 MILLIGRAM(S): at 17:45

## 2019-06-06 RX ADMIN — Medication 300 MILLIGRAM(S): at 11:43

## 2019-06-06 NOTE — CHART NOTE - NSCHARTNOTEFT_GEN_A_CORE
Hospitalist Medicine NP    Called by RN for NG tube insertion. Pt is a 74y old Female admitted for CVA with dysphagia, NG tube order noted on chart.    T(C): 37 (06-06-19 @ 11:42), Max: 38 (06-06-19 @ 04:59)  T(F): 98.6 (06-06-19 @ 11:42), Max: 100.4 (06-06-19 @ 04:59)  HR: 93 (06-06-19 @ 11:42) (92 - 105)  BP: 147/45 (06-06-19 @ 11:42) (140/68 - 178/66)  RR: 20 (06-06-19 @ 11:12) (18 - 20)  SpO2: 97% (06-06-19 @ 11:42) (94% - 97%)  Wt(kg): --    NGT inserted into right nare. Pt tolerated procedure well. Placement verified via auscultation. KUB ordered to confirm placement.

## 2019-06-06 NOTE — PROGRESS NOTE ADULT - SUBJECTIVE AND OBJECTIVE BOX
Subjective Complaints:  Historian:   record      REVIEW OF SYSTEMS:  Eyes:  Good vision, no reported pain  ENT:  No sore throat, pain, runny nose, dysphagia  CV:  No pain, palpitatioins, hypo/hypertension  Resp:  No dyspnea, cough, tachypnea, wheezing  GI:  No pain, nausea, vomiting, diarrhea, constipatiion  Muscle:  No pain, weakness  Neuro:  No weakness, tingling, memory problems  Psych:  No fatigue, insomnia, mood problems, depression  Endocrine:  No polyuria, polydypsia, cold/heat intolerance    Vital Signs Last 24 Hrs  T(C): 37.3 (06 Jun 2019 16:43), Max: 38 (06 Jun 2019 04:59)  T(F): 99.2 (06 Jun 2019 16:43), Max: 100.4 (06 Jun 2019 04:59)  HR: 99 (06 Jun 2019 16:43) (92 - 103)  BP: 162/67 (06 Jun 2019 16:43) (140/68 - 178/66)  BP(mean): --  RR: 18 (06 Jun 2019 16:43) (18 - 20)  SpO2: 96% (06 Jun 2019 16:43) (94% - 97%)    GENERAL PHYSICAL EXAM:  General:  Appears stated age, well-groomed, well-nourished, no distress  HEENT:  NC/AT, patent nares w/ pink mucosa, OP clear w/o lesions, PERRL, EOMI, conjunctivae clear, no thyromegaly, nodules, adenopathy, no JVD  Chest:  Full & symmetric excursion, no increased effort, breath sounds clear  Cardiovascular:  Regular rhythm, S1, S2, no murmur/rub/S3/S4, no carotid/femoral/abdominal bruit, radial/pedal pulses 2+, no edema  Abdomen:  Soft, non-tender, non-distended, normoactive bowel sounds, no HSM  Extremities:  Gait & station:   Digits:   Nails:   Joints, Bones, Muscles:   ROM:   Stability:  Skin:  No rash/erythema/ecchymoses/petechiae/wounds/abscess/warm/dry  Musculoskeletal:  Full ROM in all joints w/o swelling/tenderness/effusion    NEUROLOGICAL EXAM:  HENT:  Normocephalic head; atraumatic head.  Neck supple.  ENT: normal looking.  Mental State:    Alert.  oriented to person,  Coherent.  Speech is hesitant with word finding difficulty   Cooperative. at time confused    Cranial Nerves:  II-XII:   Pupils round and reactive to light and accommodation.  Extraocular movements full.  Visual fields full (no homonymous hemianopsia).  Visual acuity wnl.  Facial symmetry intact.  Tongue midline.swallowing difficulty  Motor Functions:  Moves all extremities.  No pronator drift of UE.    Sensory Functions:   Intact to touch and pinprick to face and extremities.    Reflexes:  Deep tendon reflexes normoactive to biceps, knees and ankles.  Babinski absent (present).  Cerebellar Testing:    Finger to nose intact.  Nystagmus absent.  Gait : hesitant     LABS:    06-06    146<H>  |  114<H>  |  5<L>  ----------------------------<  101<H>  3.2<L>   |  20<L>  |  0.91    Ca    9.0      06 Jun 2019 06:22              RADIOLOGY & ADDITIONAL STUDIES:    POCT  Blood Glucose: 00:22 (06-06 @ 00:24)  POCT  Blood Glucose: 07:22 (06-06 @ 07:24)  potassium chloride  10 mEq/100 mL IVPB:   10 milliEquivalent(s) in IV Solution 100 milliLiter(s), IV Intermittent, every 1 hour, infuse over 60 Minute(s), Stop After 3 Doses  Provider's Contact #: [phone] (06-06 @ 11:30)  Basic Metabolic Panel: AM Sched. Collection: 07-Jun-2019 05:00 (06-06 @ 11:30)  POCT  Blood Glucose: 11:38 (06-06 @ 11:42)  Provider to RN:       NTG tube stat  May give Ativan 1mg prior to placemnt  Change all meds to NG tube  start feeding if in place (06-06 @ 13:10)  Diet, Regular:   Tube Feeding Modality: Nasogastric  Jevity 1.2 Martin  Total Volume for 24 Hours (mL): 1320  Continuous  Starting Tube Feed Rate mL per Hour: 30  Increase Tube Feed Rate by (mL): 5     Every 6 hours  Until Goal Tube Feed Rate (mL per Hour): 55  Tube Feed Duration (in Hours): 24  Tube Feed Start Time: 14:00 (06-06 @ 13:14)  NonViolent NonSelf-Destructive Level One Restraint:   BEHAVIOR/REASON:;  Medical Management  TYPE OF RESTRAINT:;  Right Unsecured Mitten  ORDER DURATION:  24 Hours  BEHAVIOR DISCONTINUATION CRITERIA:   Patient will Cease Engaging In/Threatening: Pulling at Tubes/Lines and Drains (06-06 @ 14:09)  Xray Kidney Ureter Bladder: Urgent   Indication: NG TUBE PLACEMENT  Transport: Portable  Exam Completed  Provider's Contact #: [phone] (06-06 @ 14:09)  LORazepam     Tablet: [Ordered as ATIVAN]  0.5 milliGRAM(s), Oral via Nasogastric tube, two times a day  Indication: agitation  Special Instructions: HOLD for sedation or if sleeping  Administration Instructions: This is a Look-alike/Sound-alike Medication  Provider's Contact #: [phone] (06-06 @ 14:42)  Provider to RN:       May use NGTube for feeding. (06-06 @ 15:26)  DX Left MCA TERRITORY INFARCT     Recommendations:    Carotid doppler.  Echocardiogram.PEG WAS INSERTED     DVT prophylaxis as ordered.  Medications:  ASA 81 MGR AND PLAVIX 75 MGR --STATIN DRUG

## 2019-06-06 NOTE — PROGRESS NOTE ADULT - SUBJECTIVE AND OBJECTIVE BOX
agitated, screaming  VSS  Long discussion cdaughter  agreed c NG tube, start feeding  s/p Acute CVA  Psychosis

## 2019-06-06 NOTE — PROGRESS NOTE BEHAVIORAL HEALTH - SUMMARY
NEW RECOMMENDATIONS  - patient sedated this AM but did not receive any PRNs nor Remeron at hs the previous night that would potentially explain residual sedation; would rescan head to rule out new CVA or expansion of original one  - Speech and Swallow eval

## 2019-06-06 NOTE — CHART NOTE - NSCHARTNOTEFT_GEN_A_CORE
[facility]       To Whom It May Concern,     Jt Swann was admitted on 5/31/19 and still in the hospital for further treatment.  If any questions or concerns please call.     Thanks   Elsie Beltre DNP  [phone]  Beeper # [id number]

## 2019-06-06 NOTE — PROGRESS NOTE BEHAVIORAL HEALTH - OTHER
looks older than stated age n/a- sleeping deferred at this time n/a - sleeping unable to ascertain at this time

## 2019-06-07 LAB
ANION GAP SERPL CALC-SCNC: 9 MMOL/L — SIGNIFICANT CHANGE UP (ref 5–17)
BUN SERPL-MCNC: 6 MG/DL — LOW (ref 7–23)
CALCIUM SERPL-MCNC: 9.2 MG/DL — SIGNIFICANT CHANGE UP (ref 8.5–10.1)
CHLORIDE SERPL-SCNC: 115 MMOL/L — HIGH (ref 96–108)
CO2 SERPL-SCNC: 24 MMOL/L — SIGNIFICANT CHANGE UP (ref 22–31)
CREAT SERPL-MCNC: 0.77 MG/DL — SIGNIFICANT CHANGE UP (ref 0.5–1.3)
GLUCOSE BLDC GLUCOMTR-MCNC: 106 MG/DL — HIGH (ref 70–99)
GLUCOSE BLDC GLUCOMTR-MCNC: 111 MG/DL — HIGH (ref 70–99)
GLUCOSE BLDC GLUCOMTR-MCNC: 96 MG/DL — SIGNIFICANT CHANGE UP (ref 70–99)
GLUCOSE SERPL-MCNC: 112 MG/DL — HIGH (ref 70–99)
POTASSIUM SERPL-MCNC: 3.7 MMOL/L — SIGNIFICANT CHANGE UP (ref 3.5–5.3)
POTASSIUM SERPL-SCNC: 3.7 MMOL/L — SIGNIFICANT CHANGE UP (ref 3.5–5.3)
SODIUM SERPL-SCNC: 148 MMOL/L — HIGH (ref 135–145)

## 2019-06-07 PROCEDURE — 99233 SBSQ HOSP IP/OBS HIGH 50: CPT

## 2019-06-07 RX ORDER — PIPERACILLIN AND TAZOBACTAM 4; .5 G/20ML; G/20ML
3.38 INJECTION, POWDER, LYOPHILIZED, FOR SOLUTION INTRAVENOUS EVERY 8 HOURS
Refills: 0 | Status: DISCONTINUED | OUTPATIENT
Start: 2019-06-07 | End: 2019-06-15

## 2019-06-07 RX ADMIN — PIPERACILLIN AND TAZOBACTAM 25 GRAM(S): 4; .5 INJECTION, POWDER, LYOPHILIZED, FOR SOLUTION INTRAVENOUS at 13:18

## 2019-06-07 RX ADMIN — Medication 25 MILLIGRAM(S): at 22:24

## 2019-06-07 RX ADMIN — Medication 25 MILLIGRAM(S): at 05:58

## 2019-06-07 RX ADMIN — Medication 650 MILLIGRAM(S): at 13:18

## 2019-06-07 RX ADMIN — PIPERACILLIN AND TAZOBACTAM 25 GRAM(S): 4; .5 INJECTION, POWDER, LYOPHILIZED, FOR SOLUTION INTRAVENOUS at 22:23

## 2019-06-07 RX ADMIN — Medication 1 MILLIGRAM(S): at 17:37

## 2019-06-07 RX ADMIN — Medication 300 MILLIGRAM(S): at 13:17

## 2019-06-07 RX ADMIN — MIRTAZAPINE 7.5 MILLIGRAM(S): 45 TABLET, ORALLY DISINTEGRATING ORAL at 22:24

## 2019-06-07 RX ADMIN — SODIUM CHLORIDE 60 MILLILITER(S): 9 INJECTION, SOLUTION INTRAVENOUS at 17:30

## 2019-06-07 RX ADMIN — HEPARIN SODIUM 5000 UNIT(S): 5000 INJECTION INTRAVENOUS; SUBCUTANEOUS at 05:58

## 2019-06-07 RX ADMIN — Medication 0.5 MILLIGRAM(S): at 05:57

## 2019-06-07 RX ADMIN — HEPARIN SODIUM 5000 UNIT(S): 5000 INJECTION INTRAVENOUS; SUBCUTANEOUS at 22:24

## 2019-06-07 RX ADMIN — Medication 25 MILLIGRAM(S): at 13:18

## 2019-06-07 RX ADMIN — LISINOPRIL 20 MILLIGRAM(S): 2.5 TABLET ORAL at 05:58

## 2019-06-07 RX ADMIN — HEPARIN SODIUM 5000 UNIT(S): 5000 INJECTION INTRAVENOUS; SUBCUTANEOUS at 13:18

## 2019-06-07 RX ADMIN — Medication 25 MILLIGRAM(S): at 17:30

## 2019-06-07 NOTE — PROGRESS NOTE ADULT - SUBJECTIVE AND OBJECTIVE BOX
Patient is a 74y old  Female who presents with a chief complaint of CVA (06 Jun 2019 16:58)      INTERVAL HPI/OVERNIGHT EVENTS:  NG feedig, lethargic, sedated    MEDICATIONS  (STANDING):  aspirin Suppository 300 milliGRAM(s) Rectal daily  dextrose 5% + sodium chloride 0.9% 1000 milliLiter(s) (60 mL/Hr) IV Continuous <Continuous>  dextrose 5%. 1000 milliLiter(s) (50 mL/Hr) IV Continuous <Continuous>  dextrose 50% Injectable 12.5 Gram(s) IV Push once  dextrose 50% Injectable 25 Gram(s) IV Push once  dextrose 50% Injectable 25 Gram(s) IV Push once  heparin  Injectable 5000 Unit(s) SubCutaneous every 8 hours  hydrALAZINE 25 milliGRAM(s) Oral three times a day  lisinopril 20 milliGRAM(s) Oral daily  LORazepam     Tablet 0.5 milliGRAM(s) Oral two times a day  metoprolol tartrate 25 milliGRAM(s) Oral two times a day  mirtazapine 7.5 milliGRAM(s) Oral at bedtime    MEDICATIONS  (PRN):  acetaminophen  Suppository .. 650 milliGRAM(s) Rectal every 6 hours PRN Temp greater or equal to 38C (100.4F), Mild Pain (1 - 3)  dextrose 40% Gel 15 Gram(s) Oral once PRN Blood Glucose LESS THAN 70 milliGRAM(s)/deciLiter  glucagon  Injectable 1 milliGRAM(s) IntraMuscular once PRN Glucose <70 milliGRAM(s)/deciLiter  hydrALAZINE Injectable 10 milliGRAM(s) IV Push every 6 hours PRN SBP GREATER THAN 160  LORazepam     Tablet 0.5 milliGRAM(s) Oral every 6 hours PRN anxiety / mild agitation  LORazepam   Injectable 0.5 milliGRAM(s) IV Push every 6 hours PRN severe agitation      Allergies    No Known Allergies    Intolerances        REVIEW OF SYSTEMS:    lethargic  , aphasia      Vital Signs Last 24 Hrs  T(C): 37.6 (07 Jun 2019 04:50), Max: 37.7 (06 Jun 2019 23:47)  T(F): 99.7 (07 Jun 2019 04:50), Max: 99.9 (06 Jun 2019 23:47)  HR: 84 (07 Jun 2019 04:50) (84 - 100)  BP: 151/66 (07 Jun 2019 04:50) (140/68 - 162/67)  BP(mean): --  RR: 18 (07 Jun 2019 04:50) (18 - 20)  SpO2: 96% (07 Jun 2019 04:50) (94% - 97%)    PHYSICAL EXAM:  general       HEENT NG feeding  CHEST/LUNG: mild rhonchi b/l  HEART: Regular rate and rhythm; No murmurs, rubs, or gallops  ABDOMEN: Soft, Nontender, Nondistended; Bowel sounds present  EXTREMITIES:  2+ Peripheral Pulses, No clubbing, cyanosis, or edema  LYMPH: No lymphadenopathy noted  SKIN: No rashes or lesions    LABS:    06-07    148<H>  |  115<H>  |  6<L>  ----------------------------<  112<H>  3.7   |  24  |  0.77    Ca    9.2      07 Jun 2019 05:55          CAPILLARY BLOOD GLUCOSE      POCT Blood Glucose.: 106 mg/dL (07 Jun 2019 06:07)  POCT Blood Glucose.: 111 mg/dL (07 Jun 2019 00:15)  POCT Blood Glucose.: 113 mg/dL (06 Jun 2019 17:05)  POCT Blood Glucose.: 151 mg/dL (06 Jun 2019 11:38)          Hemoglobin A1C, Whole Blood: 5.4 % (05-31 @ 11:32)    Cholesterol, Serum: 202 mg/dL (05-31 @ 11:37)  HDL Cholesterol, Serum: 94 mg/dL (05-31 @ 11:37)  Triglycerides, Serum: 42 mg/dL (05-31 @ 11:37)      RADIOLOGY & ADDITIONAL TESTS:    Imaging Personally Reviewed:  [y ] YES  [ ] NO    Consultant(s) Notes Reviewed:  [y ] YES  [ ] NO    Care Discussed with Consultants/Other Providers [ ] YES  [ ] NO    PROBLEMS:  CEREBRAL VASCULAR ACCIDENT,ELEVATED TROPONIN LEVEL ONE  WEAKNESS  CVA (cerebral vascular accident)  Delirium due to another medical condition, acute, hyperactive  Preventive measure  Rectal cancer  Elevated troponin I level  Cerebrovascular accident (CVA), unspecified mechanism  low grade fever, r/o aspiraton      Care discussed with family,         [x  ]   yes  [  ]  No    imp:    stable[ ]    unstable[x  ]     improving [   ]       unchanged  [  ]                Plans:  CXR  start Zosyn 3.375 g tid  ASA heparin  BP cotrol  All meds via NGT

## 2019-06-07 NOTE — PROGRESS NOTE BEHAVIORAL HEALTH - SUMMARY
NEW RECOMMENDATIONS  - increase standing Ativan 0.5mg PO bid via NGT to 1mg PO bid given continues agitated and need for PRNs

## 2019-06-07 NOTE — PROGRESS NOTE ADULT - SUBJECTIVE AND OBJECTIVE BOX
Subjective Complaints:  Historian:    record     REVIEW OF SYSTEMS:  Eyes:  Good vision, no reported pain  ENT:  No sore throat, pain, runny nose, dysphagia  CV:  No pain, palpitatioins, hypo/hypertension  Resp:  No dyspnea, cough, tachypnea, wheezing  GI:  No pain, nausea, vomiting, diarrhea, constipatiion  Muscle:  No pain, weakness  Neuro:  No weakness, tingling, memory problems  Psych:  No fatigue, insomnia, mood problems, depression  Endocrine:  No polyuria, polydypsia, cold/heat intolerance    Vital Signs Last 24 Hrs  T(C): 37.6 (07 Jun 2019 04:50), Max: 37.7 (06 Jun 2019 23:47)  T(F): 99.7 (07 Jun 2019 04:50), Max: 99.9 (06 Jun 2019 23:47)  HR: 84 (07 Jun 2019 04:50) (84 - 99)  BP: 151/66 (07 Jun 2019 04:50) (143/91 - 162/67)  BP(mean): --  RR: 18 (07 Jun 2019 04:50) (18 - 18)  SpO2: 96% (07 Jun 2019 04:50) (96% - 97%)    GENERAL PHYSICAL EXAM:  General:  Appears stated age, well-groomed, well-nourished, no distress  HEENT:  NC/AT, patent nares w/ pink mucosa, OP clear w/o lesions, PERRL, EOMI, conjunctivae clear, no thyromegaly, nodules, adenopathy, no JVD  Chest:  Full & symmetric excursion, no increased effort, breath sounds clear  Cardiovascular:  Regular rhythm, S1, S2, no murmur/rub/S3/S4, no carotid/femoral/abdominal bruit, radial/pedal pulses 2+, no edema  Abdomen:  Soft, non-tender, non-distended, normoactive bowel sounds, no HSM  Extremities:  Gait & station:   Digits:   Nails:   Joints, Bones, Muscles:   ROM:   Stability:  Skin:  No rash/erythema/ecchymoses/petechiae/wounds/abscess/warm/dry  Musculoskeletal:  Full ROM in all joints w/o swelling/tenderness/effusion    NEUROLOGICAL EXAM:  HENT:  Normocephalic head; atraumatic head.  Neck supple.  ENT: normal looking.  Mental State:    Alert.  Fully oriented to person,.  Speech is dysarthric ,hesitant ,word finding difficulty   Cooperative.  Responds appropriately.    Cranial Nerves:  II-XII:   Pupils round and reactive to light and accommodation.  Extraocular movements full.  Visual fields full (no homonymous hemianopsia).  Visual acuity wnl.  Facial symmetry intact.  Tongue midline.  Motor Functions:  Moves all extremities.  No pronator drift of UE.  Claps hand well.  Hand  intact bilaterally.  Ambulatory.    Sensory Functions:   Intact to touch and pinprick to face and extremities.    Reflexes:  Deep tendon reflexes normoactive to biceps, knees and ankles.  Babinski absent (present).  Cerebellar Testing:    Finger to nose intact.  Nystagmus absent.  Neurovascular: Carotid auscultation full without bruits.      LABS:    06-07    148<H>  |  115<H>  |  6<L>  ----------------------------<  112<H>  3.7   |  24  |  0.77    Ca    9.2      07 Jun 2019 05:55              RADIOLOGY & ADDITIONAL STUDIES:    POCT  Blood Glucose: 11:38 (06-06 @ 11:42)  Provider to RN:       NTG tube stat  May give Ativan 1mg prior to placemnt  Change all meds to NG tube  start feeding if in place (06-06 @ 13:10)  Diet, Regular:   Tube Feeding Modality: Nasogastric  Jevity 1.2 Martin  Total Volume for 24 Hours (mL): 1320  Continuous  Starting Tube Feed Rate mL per Hour: 30  Increase Tube Feed Rate by (mL): 5     Every 6 hours  Until Goal Tube Feed Rate (mL per Hour): 55  Tube Feed Duration (in Hours): 24  Tube Feed Start Time: 14:00 (06-06 @ 13:14)  NonViolent NonSelf-Destructive Level One Restraint:   BEHAVIOR/REASON:;  Medical Management  TYPE OF RESTRAINT:;  Right Unsecured Mitten  ORDER DURATION:  24 Hours  BEHAVIOR DISCONTINUATION CRITERIA:   Patient will Cease Engaging In/Threatening: Pulling at Tubes/Lines and Drains (06-06 @ 14:09)  Xray Kidney Ureter Bladder: Urgent   Indication: NG TUBE PLACEMENT  Transport: Portable  Exam Completed  Provider's Contact #: [phone] (06-06 @ 14:09)  LORazepam     Tablet: [Ordered as ATIVAN]  0.5 milliGRAM(s), Oral via Nasogastric tube, two times a day  Indication: agitation  Special Instructions: HOLD for sedation or if sleeping  Administration Instructions: This is a Look-alike/Sound-alike Medication  Provider's Contact #: [phone] (06-06 @ 14:42)  Provider to RN:       May use NGTube for feeding. (06-06 @ 15:26)  POCT  Blood Glucose: 17:05 (06-06 @ 17:07)  Provider to RN:       Give all PO medication via NG Tube. (06-06 @ 17:33)  POCT  Blood Glucose: 00:15 (06-07 @ 00:17)  POCT  Blood Glucose: 06:07 (06-07 @ 06:19)  piperacillin/tazobactam IVPB.: [Known as ZOSYN IVPB.]  3.375 Gram(s) in dextrose 5% 100 milliLiter(s), IV Intermittent, every 8 hours, infuse over 4 Hour(s)  Indication: aspiratio PNA  Special Instructions: For Creatinine Clearance GREATER THAN 20 mL/min  Administration Instructions: 4 HOUR INFUSE (06-07 @ 09:21)  Complete Blood Count: AM Sched. Collection: 08-Jun-2019 05:00 (06-07 @ 09:21)  Basic Metabolic Panel: AM Sched. Collection: 08-Jun-2019 05:00 (06-07 @ 09:21)  Provider to RN:       1. d/c Acu checks  2. change all po medications to NGT, please (06-07 @ 09:21)  NonViolent NonSelf-Destructive Level One Restraint:   BEHAVIOR/REASON:;  Medical Management  TYPE OF RESTRAINT:;  Right Unsecured Mitten;  Left Unsecured Mitten  ORDER DURATION:  24 Hours  BEHAVIOR DISCONTINUATION CRITERIA:   Patient will Cease Engaging In/Threatening: Pulling at Tubes/Lines and Drains (06-07 @ 11:39)  POCT  Blood Glucose: 11:35 (06-07 @ 11:39)  DX LEFT MCA INFARCT --PSYCHOSIS     Recommendations:  ATIVAN PRN    DVT prophylaxis   Medications:  ASA ,STATIN

## 2019-06-07 NOTE — PROGRESS NOTE BEHAVIORAL HEALTH - NSBHFUPINTERVALHXFT_PSY_A_CORE
Patient has had intermittent agitation including trying to pull her NGT necessitating PRN treatment. Patient at time of examination was dozing off in her bed calmly and looked comfortable - s/p PRN usage. Patient seems to be tolerating benzos without any discernible adverse medication side effects.

## 2019-06-08 LAB
ANION GAP SERPL CALC-SCNC: 9 MMOL/L — SIGNIFICANT CHANGE UP (ref 5–17)
BUN SERPL-MCNC: 8 MG/DL — SIGNIFICANT CHANGE UP (ref 7–23)
CALCIUM SERPL-MCNC: 9.3 MG/DL — SIGNIFICANT CHANGE UP (ref 8.5–10.1)
CHLORIDE SERPL-SCNC: 114 MMOL/L — HIGH (ref 96–108)
CO2 SERPL-SCNC: 24 MMOL/L — SIGNIFICANT CHANGE UP (ref 22–31)
CREAT SERPL-MCNC: 0.8 MG/DL — SIGNIFICANT CHANGE UP (ref 0.5–1.3)
GLUCOSE BLDC GLUCOMTR-MCNC: 104 MG/DL — HIGH (ref 70–99)
GLUCOSE BLDC GLUCOMTR-MCNC: 131 MG/DL — HIGH (ref 70–99)
GLUCOSE SERPL-MCNC: 138 MG/DL — HIGH (ref 70–99)
HCT VFR BLD CALC: 29.4 % — LOW (ref 34.5–45)
HGB BLD-MCNC: 9.4 G/DL — LOW (ref 11.5–15.5)
MCHC RBC-ENTMCNC: 29.7 PG — SIGNIFICANT CHANGE UP (ref 27–34)
MCHC RBC-ENTMCNC: 32 GM/DL — SIGNIFICANT CHANGE UP (ref 32–36)
MCV RBC AUTO: 92.7 FL — SIGNIFICANT CHANGE UP (ref 80–100)
NRBC # BLD: 0 /100 WBCS — SIGNIFICANT CHANGE UP (ref 0–0)
PLATELET # BLD AUTO: 244 K/UL — SIGNIFICANT CHANGE UP (ref 150–400)
POTASSIUM SERPL-MCNC: 4.7 MMOL/L — SIGNIFICANT CHANGE UP (ref 3.5–5.3)
POTASSIUM SERPL-SCNC: 4.7 MMOL/L — SIGNIFICANT CHANGE UP (ref 3.5–5.3)
RBC # BLD: 3.17 M/UL — LOW (ref 3.8–5.2)
RBC # FLD: 15.1 % — HIGH (ref 10.3–14.5)
SODIUM SERPL-SCNC: 147 MMOL/L — HIGH (ref 135–145)
WBC # BLD: 9.2 K/UL — SIGNIFICANT CHANGE UP (ref 3.8–10.5)
WBC # FLD AUTO: 9.2 K/UL — SIGNIFICANT CHANGE UP (ref 3.8–10.5)

## 2019-06-08 PROCEDURE — 74018 RADEX ABDOMEN 1 VIEW: CPT | Mod: 26

## 2019-06-08 RX ADMIN — Medication 25 MILLIGRAM(S): at 17:21

## 2019-06-08 RX ADMIN — PIPERACILLIN AND TAZOBACTAM 25 GRAM(S): 4; .5 INJECTION, POWDER, LYOPHILIZED, FOR SOLUTION INTRAVENOUS at 21:22

## 2019-06-08 RX ADMIN — PIPERACILLIN AND TAZOBACTAM 25 GRAM(S): 4; .5 INJECTION, POWDER, LYOPHILIZED, FOR SOLUTION INTRAVENOUS at 05:24

## 2019-06-08 RX ADMIN — HEPARIN SODIUM 5000 UNIT(S): 5000 INJECTION INTRAVENOUS; SUBCUTANEOUS at 13:26

## 2019-06-08 RX ADMIN — Medication 25 MILLIGRAM(S): at 05:24

## 2019-06-08 RX ADMIN — MIRTAZAPINE 7.5 MILLIGRAM(S): 45 TABLET, ORALLY DISINTEGRATING ORAL at 21:23

## 2019-06-08 RX ADMIN — HEPARIN SODIUM 5000 UNIT(S): 5000 INJECTION INTRAVENOUS; SUBCUTANEOUS at 05:24

## 2019-06-08 RX ADMIN — Medication 1 MILLIGRAM(S): at 05:23

## 2019-06-08 RX ADMIN — LISINOPRIL 20 MILLIGRAM(S): 2.5 TABLET ORAL at 05:24

## 2019-06-08 RX ADMIN — PIPERACILLIN AND TAZOBACTAM 25 GRAM(S): 4; .5 INJECTION, POWDER, LYOPHILIZED, FOR SOLUTION INTRAVENOUS at 13:26

## 2019-06-08 RX ADMIN — HEPARIN SODIUM 5000 UNIT(S): 5000 INJECTION INTRAVENOUS; SUBCUTANEOUS at 21:22

## 2019-06-08 RX ADMIN — SODIUM CHLORIDE 60 MILLILITER(S): 9 INJECTION, SOLUTION INTRAVENOUS at 12:23

## 2019-06-08 RX ADMIN — Medication 300 MILLIGRAM(S): at 12:22

## 2019-06-08 RX ADMIN — Medication 25 MILLIGRAM(S): at 21:22

## 2019-06-08 RX ADMIN — Medication 0.5 MILLIGRAM(S): at 03:25

## 2019-06-08 NOTE — PROGRESS NOTE ADULT - SUBJECTIVE AND OBJECTIVE BOX
Patient is a 74y old  Female who presents with a chief complaint of CVA (07 Jun 2019 11:39)      INTERVAL HPI/OVERNIGHT EVENTS: NG feeding    MEDICATIONS  (STANDING):  aspirin Suppository 300 milliGRAM(s) Rectal daily  dextrose 5% + sodium chloride 0.9% 1000 milliLiter(s) (60 mL/Hr) IV Continuous <Continuous>  dextrose 5%. 1000 milliLiter(s) (50 mL/Hr) IV Continuous <Continuous>  dextrose 50% Injectable 12.5 Gram(s) IV Push once  dextrose 50% Injectable 25 Gram(s) IV Push once  dextrose 50% Injectable 25 Gram(s) IV Push once  heparin  Injectable 5000 Unit(s) SubCutaneous every 8 hours  hydrALAZINE 25 milliGRAM(s) Oral three times a day  lisinopril 20 milliGRAM(s) Oral daily  LORazepam     Tablet 1 milliGRAM(s) Oral two times a day  metoprolol tartrate 25 milliGRAM(s) Oral two times a day  mirtazapine 7.5 milliGRAM(s) Oral at bedtime  piperacillin/tazobactam IVPB. 3.375 Gram(s) IV Intermittent every 8 hours    MEDICATIONS  (PRN):  acetaminophen  Suppository .. 650 milliGRAM(s) Rectal every 6 hours PRN Temp greater or equal to 38C (100.4F), Mild Pain (1 - 3)  dextrose 40% Gel 15 Gram(s) Oral once PRN Blood Glucose LESS THAN 70 milliGRAM(s)/deciLiter  glucagon  Injectable 1 milliGRAM(s) IntraMuscular once PRN Glucose <70 milliGRAM(s)/deciLiter  hydrALAZINE Injectable 10 milliGRAM(s) IV Push every 6 hours PRN SBP GREATER THAN 160  LORazepam     Tablet 0.5 milliGRAM(s) Oral every 6 hours PRN anxiety / mild agitation  LORazepam   Injectable 0.5 milliGRAM(s) IV Push every 6 hours PRN severe agitation      Allergies    No Known Allergies    Intolerances        REVIEW OF SYSTEMS:    AA dysarthria dysphagia        Vital Signs Last 24 Hrs  T(C): 37.4 (08 Jun 2019 05:08), Max: 38.2 (07 Jun 2019 11:40)  T(F): 99.3 (08 Jun 2019 05:08), Max: 100.7 (07 Jun 2019 11:40)  HR: 78 (08 Jun 2019 05:08) (73 - 78)  BP: 153/74 (08 Jun 2019 05:08) (124/53 - 165/56)  BP(mean): --  RR: 16 (08 Jun 2019 05:08) (16 - 18)  SpO2: 99% (08 Jun 2019 05:08) (97% - 100%)    PHYSICAL EXAM:  general       HEENT wnl  CHEST/LUNG: mild conductive rhonchi at bases  HEART: Regular rate and rhythm; No murmurs, rubs, or gallops  ABDOMEN: Soft, Nontender, Nondistended; Bowel sounds present  EXTREMITIES:  2+ Peripheral Pulses, No clubbing, cyanosis, or edema  LYMPH: No lymphadenopathy noted  SKIN: No rashes or lesions    LABS:                        9.4    9.20  )-----------( 244      ( 08 Jun 2019 07:42 )             29.4     06-08    147<H>  |  114<H>  |  8   ----------------------------<  138<H>  4.7   |  24  |  0.80    Ca    9.3      08 Jun 2019 07:42          CAPILLARY BLOOD GLUCOSE      POCT Blood Glucose.: 96 mg/dL (07 Jun 2019 11:35)                RADIOLOGY & ADDITIONAL TESTS:    Imaging Personally Reviewed:  [y ] YES  [ ] NO    Consultant(s) Notes Reviewed:  [y ] YES  [ ] NO    Care Discussed with Consultants/Other Providers [ ] YES  [ ] NO    PROBLEMS:  CEREBRAL VASCULAR ACCIDENT,ELEVATED TROPONIN LEVEL ONE  WEAKNESS  CVA (cerebral vascular accident)  Delirium due to another medical condition, acute, hyperactive  Preventive measure  Rectal cancer  Elevated troponin I level  Cerebrovascular accident (CVA), unspecified mechanism  Dysphagia  Dysarthria    Care discussed with family,         [x  ]   yes  [  ]  No    imp:    stable[ ]    unstable[  ]     improving [   ]       unchanged  [ x ]                Plans:  NG feeding  Neuro to f/u  CPM

## 2019-06-09 LAB — GLUCOSE BLDC GLUCOMTR-MCNC: 131 MG/DL — HIGH (ref 70–99)

## 2019-06-09 RX ADMIN — PIPERACILLIN AND TAZOBACTAM 25 GRAM(S): 4; .5 INJECTION, POWDER, LYOPHILIZED, FOR SOLUTION INTRAVENOUS at 05:44

## 2019-06-09 RX ADMIN — HEPARIN SODIUM 5000 UNIT(S): 5000 INJECTION INTRAVENOUS; SUBCUTANEOUS at 21:21

## 2019-06-09 RX ADMIN — Medication 25 MILLIGRAM(S): at 17:35

## 2019-06-09 RX ADMIN — HEPARIN SODIUM 5000 UNIT(S): 5000 INJECTION INTRAVENOUS; SUBCUTANEOUS at 05:44

## 2019-06-09 RX ADMIN — Medication 25 MILLIGRAM(S): at 05:45

## 2019-06-09 RX ADMIN — PIPERACILLIN AND TAZOBACTAM 25 GRAM(S): 4; .5 INJECTION, POWDER, LYOPHILIZED, FOR SOLUTION INTRAVENOUS at 21:22

## 2019-06-09 RX ADMIN — PIPERACILLIN AND TAZOBACTAM 25 GRAM(S): 4; .5 INJECTION, POWDER, LYOPHILIZED, FOR SOLUTION INTRAVENOUS at 13:37

## 2019-06-09 RX ADMIN — HEPARIN SODIUM 5000 UNIT(S): 5000 INJECTION INTRAVENOUS; SUBCUTANEOUS at 13:36

## 2019-06-09 RX ADMIN — Medication 25 MILLIGRAM(S): at 13:36

## 2019-06-09 RX ADMIN — Medication 300 MILLIGRAM(S): at 11:53

## 2019-06-09 RX ADMIN — Medication 1 MILLIGRAM(S): at 05:45

## 2019-06-09 RX ADMIN — Medication 650 MILLIGRAM(S): at 01:35

## 2019-06-09 RX ADMIN — LISINOPRIL 20 MILLIGRAM(S): 2.5 TABLET ORAL at 05:45

## 2019-06-09 RX ADMIN — Medication 650 MILLIGRAM(S): at 01:05

## 2019-06-09 RX ADMIN — Medication 0.5 MILLIGRAM(S): at 00:45

## 2019-06-09 RX ADMIN — SODIUM CHLORIDE 60 MILLILITER(S): 9 INJECTION, SOLUTION INTRAVENOUS at 05:46

## 2019-06-09 RX ADMIN — MIRTAZAPINE 7.5 MILLIGRAM(S): 45 TABLET, ORALLY DISINTEGRATING ORAL at 21:22

## 2019-06-09 RX ADMIN — Medication 1 MILLIGRAM(S): at 17:35

## 2019-06-09 NOTE — PROGRESS NOTE ADULT - SUBJECTIVE AND OBJECTIVE BOX
Subjective Complaints:  Historian:  record and patient        REVIEW OF SYSTEMS:  Eyes:  Good vision, no reported pain  ENT:  No sore throat, pain, runny nose, dysphagia  CV:  No pain, palpitatioins, hypo/hypertension  Resp:  No dyspnea, cough, tachypnea, wheezing  GI:  No pain, nausea, vomiting, diarrhea, constipatiion  Muscle:  No pain, weakness  Neuro:  No weakness, tingling, memory problems  Psych:  No fatigue, insomnia, mood problems, depression  Endocrine:  No polyuria, polydypsia, cold/heat intolerance    Vital Signs Last 24 Hrs  T(C): 36.5 (09 Jun 2019 10:15), Max: 37.3 (08 Jun 2019 23:10)  T(F): 97.7 (09 Jun 2019 10:15), Max: 99.1 (08 Jun 2019 23:10)  HR: 73 (09 Jun 2019 10:15) (65 - 86)  BP: 111/42 (09 Jun 2019 10:15) (111/42 - 193/83)  BP(mean): --  RR: 18 (09 Jun 2019 10:15) (17 - 18)  SpO2: 99% (09 Jun 2019 10:15) (96% - 99%)    GENERAL PHYSICAL EXAM:  General:  Appears stated age, well-groomed, well-nourished, no distress  HEENT:  NC/AT, patent nares w/ pink mucosa, OP clear w/o lesions, PERRL, EOMI, conjunctivae clear, no thyromegaly, nodules, adenopathy, no JVD  Chest:  Full & symmetric excursion, no increased effort, breath sounds clear  Cardiovascular:  Regular rhythm, S1, S2, no murmur/rub/S3/S4, no carotid/femoral/abdominal bruit, radial/pedal pulses 2+, no edema  Abdomen:  Soft, non-tender, non-distended, normoactive bowel sounds, no HSM  Extremities:  Gait & station:   Digits:   Nails:   Joints, Bones, Muscles:   ROM:   Stability:  Skin:  No rash/erythema/ecchymoses/petechiae/wounds/abscess/warm/dry  Musculoskeletal:  Full ROM in all joints w/o swelling/tenderness/effusion    NEUROLOGICAL EXAM:  HENT:  Normocephalic head; atraumatic head.  Neck supple.  ENT: normal looking.  Mental State:    Alert.  oriented to person only  incoherent   Speech ,monosyllabic   Responds appropriately.    Cranial Nerves:  II-XII:   Pupils round and reactive to light and accommodation.  Extraocular movements full.  Visual fields full (no homonymous hemianopsia).  Visual acuity wnl.  Facial symmetry intact. swallowing reflexe is impaired   Motor Functions:  Moves all extremities.  No pronator drift of UE.  Claps hand well.  Hand  intact bilaterally.  Ambulatory.    Sensory Functions:   Intact to touch and pinprick to face and extremities.    Reflexes:  Deep tendon reflexes normoactive to biceps, knees and ankles.  Babinski absent (present).  Cerebellar Testing:    Finger to nose intact.  Nystagmus absent.  Gait: hesitant      LABS:                        9.4    9.20  )-----------( 244      ( 08 Jun 2019 07:42 )             29.4     06-08    147<H>  |  114<H>  |  8   ----------------------------<  138<H>  4.7   |  24  |  0.80    Ca    9.3      08 Jun 2019 07:42              RADIOLOGY & ADDITIONAL STUDIES:    POCT  Blood Glucose: 16:48 (06-08 @ 16:49)  POCT  Blood Glucose: 23:43 (06-08 @ 23:45)  NonViolent NonSelf-Destructive Level One Restraint:   BEHAVIOR/REASON:;  Medical Management  TYPE OF RESTRAINT:;  Right Unsecured Mitten  ORDER DURATION:  24 Hours  BEHAVIOR DISCONTINUATION CRITERIA:   Patient will Cease Engaging In/Threatening: Pulling at Tubes/Lines and Drains (06-09 @ 00:28)  NonViolent NonSelf-Destructive Level One Restraint:   BEHAVIOR/REASON:;  Medical Management  TYPE OF RESTRAINT:;  Right Unsecured Mitten;  Left Unsecured Mitten  ORDER DURATION:  24 Hours  BEHAVIOR DISCONTINUATION CRITERIA:   Patient will Cease Engaging In/Threatening: Pulling at Tubes/Lines and Drains (06-09 @ 13:00)    DX Left MCA territory infarct --Aphasia   Recommendations: continue NG TUBE feeding    DVT prophylaxis as ordered.  Medications:  ASA, STATIN -SEDATION FOR AGITATION

## 2019-06-09 NOTE — PROGRESS NOTE ADULT - SUBJECTIVE AND OBJECTIVE BOX
Patient is a 74y old  Female who presents with a chief complaint of CVA (08 Jun 2019 09:35)      INTERVAL HPI/OVERNIGHT EVENTS: AA confused no distress, garbled speech, NG tube feeding    MEDICATIONS  (STANDING):  aspirin Suppository 300 milliGRAM(s) Rectal daily  dextrose 5% + sodium chloride 0.9% 1000 milliLiter(s) (60 mL/Hr) IV Continuous <Continuous>  dextrose 5%. 1000 milliLiter(s) (50 mL/Hr) IV Continuous <Continuous>  dextrose 50% Injectable 12.5 Gram(s) IV Push once  dextrose 50% Injectable 25 Gram(s) IV Push once  dextrose 50% Injectable 25 Gram(s) IV Push once  heparin  Injectable 5000 Unit(s) SubCutaneous every 8 hours  hydrALAZINE 25 milliGRAM(s) Oral three times a day  lisinopril 20 milliGRAM(s) Oral daily  LORazepam     Tablet 1 milliGRAM(s) Oral two times a day  metoprolol tartrate 25 milliGRAM(s) Oral two times a day  mirtazapine 7.5 milliGRAM(s) Oral at bedtime  piperacillin/tazobactam IVPB. 3.375 Gram(s) IV Intermittent every 8 hours    MEDICATIONS  (PRN):  acetaminophen  Suppository .. 650 milliGRAM(s) Rectal every 6 hours PRN Temp greater or equal to 38C (100.4F), Mild Pain (1 - 3)  dextrose 40% Gel 15 Gram(s) Oral once PRN Blood Glucose LESS THAN 70 milliGRAM(s)/deciLiter  glucagon  Injectable 1 milliGRAM(s) IntraMuscular once PRN Glucose <70 milliGRAM(s)/deciLiter  hydrALAZINE Injectable 10 milliGRAM(s) IV Push every 6 hours PRN SBP GREATER THAN 160  LORazepam     Tablet 0.5 milliGRAM(s) Oral every 6 hours PRN anxiety / mild agitation  LORazepam   Injectable 0.5 milliGRAM(s) IV Push every 6 hours PRN severe agitation      Allergies    No Known Allergies    Intolerances        REVIEW OF SYSTEMS:          Vital Signs Last 24 Hrs  T(C): 37.3 (08 Jun 2019 23:10), Max: 37.3 (08 Jun 2019 23:10)  T(F): 99.1 (08 Jun 2019 23:10), Max: 99.1 (08 Jun 2019 23:10)  HR: 86 (09 Jun 2019 05:41) (69 - 86)  BP: 193/83 (09 Jun 2019 05:41) (130/52 - 193/83)  BP(mean): --  RR: 17 (09 Jun 2019 05:41) (17 - 17)  SpO2: 96% (09 Jun 2019 05:41) (96% - 100%)    PHYSICAL EXAM:  general       HEENT wnl  CHEST/LUNG:< BS at bases, mild conductive rhonchi  HEART: Regular rate and rhythm; No murmurs, rubs, or gallops  ABDOMEN: Soft, Nontender, Nondistended; Bowel sounds present  EXTREMITIES:  2+ Peripheral Pulses, No clubbing, cyanosis, or edema  LYMPH: No lymphadenopathy noted  SKIN: No rashes or lesions    LABS:                        9.4    9.20  )-----------( 244      ( 08 Jun 2019 07:42 )             29.4     06-08    147<H>  |  114<H>  |  8   ----------------------------<  138<H>  4.7   |  24  |  0.80    Ca    9.3      08 Jun 2019 07:42          CAPILLARY BLOOD GLUCOSE      POCT Blood Glucose.: 104 mg/dL (08 Jun 2019 23:43)  POCT Blood Glucose.: 131 mg/dL (08 Jun 2019 16:48)                RADIOLOGY & ADDITIONAL TESTS:    Imaging Personally Reviewed:  [y ] YES  [ ] NO    Consultant(s) Notes Reviewed:  [y ] YES  [ ] NO    Care Discussed with Consultants/Other Providers [ ] YES  [ ] NO    PROBLEMS:  CEREBRAL VASCULAR ACCIDENT,ELEVATED TROPONIN LEVEL ONE  WEAKNESS  CVA (cerebral vascular accident)  Delirium due to another medical condition, acute, hyperactive  Preventive measure  Rectal cancer  Elevated troponin I level  Cerebrovascular accident (CVA), unspecified mechanism  Dysphagia      Care discussed with family,         [ x ]   yes  [  ]  No    imp:    stable[ ]    unstable[ x ]     improving [   ]       unchanged  [  ]                Plans:  Continue present plans  [x  ]                    consider PEG placement

## 2019-06-10 PROCEDURE — 99233 SBSQ HOSP IP/OBS HIGH 50: CPT

## 2019-06-10 RX ADMIN — Medication 0.5 MILLIGRAM(S): at 01:31

## 2019-06-10 RX ADMIN — Medication 1 MILLIGRAM(S): at 05:19

## 2019-06-10 RX ADMIN — Medication 25 MILLIGRAM(S): at 22:06

## 2019-06-10 RX ADMIN — PIPERACILLIN AND TAZOBACTAM 25 GRAM(S): 4; .5 INJECTION, POWDER, LYOPHILIZED, FOR SOLUTION INTRAVENOUS at 13:01

## 2019-06-10 RX ADMIN — Medication 25 MILLIGRAM(S): at 05:23

## 2019-06-10 RX ADMIN — PIPERACILLIN AND TAZOBACTAM 25 GRAM(S): 4; .5 INJECTION, POWDER, LYOPHILIZED, FOR SOLUTION INTRAVENOUS at 05:19

## 2019-06-10 RX ADMIN — LISINOPRIL 20 MILLIGRAM(S): 2.5 TABLET ORAL at 05:23

## 2019-06-10 RX ADMIN — Medication 25 MILLIGRAM(S): at 17:20

## 2019-06-10 RX ADMIN — HEPARIN SODIUM 5000 UNIT(S): 5000 INJECTION INTRAVENOUS; SUBCUTANEOUS at 05:19

## 2019-06-10 RX ADMIN — Medication 1.5 MILLIGRAM(S): at 17:20

## 2019-06-10 RX ADMIN — Medication 1 MILLIGRAM(S): at 22:53

## 2019-06-10 RX ADMIN — HEPARIN SODIUM 5000 UNIT(S): 5000 INJECTION INTRAVENOUS; SUBCUTANEOUS at 13:01

## 2019-06-10 RX ADMIN — SODIUM CHLORIDE 60 MILLILITER(S): 9 INJECTION, SOLUTION INTRAVENOUS at 00:49

## 2019-06-10 RX ADMIN — MIRTAZAPINE 7.5 MILLIGRAM(S): 45 TABLET, ORALLY DISINTEGRATING ORAL at 22:06

## 2019-06-10 RX ADMIN — Medication 300 MILLIGRAM(S): at 11:54

## 2019-06-10 RX ADMIN — HEPARIN SODIUM 5000 UNIT(S): 5000 INJECTION INTRAVENOUS; SUBCUTANEOUS at 22:06

## 2019-06-10 RX ADMIN — PIPERACILLIN AND TAZOBACTAM 25 GRAM(S): 4; .5 INJECTION, POWDER, LYOPHILIZED, FOR SOLUTION INTRAVENOUS at 22:06

## 2019-06-10 NOTE — SWALLOW BEDSIDE ASSESSMENT ADULT - SLP PERTINENT HISTORY OF CURRENT PROBLEM
history of dysphagia swallow eval completed on 5/31/2019 and recommended puree with nectar thick liquids

## 2019-06-10 NOTE — PROGRESS NOTE BEHAVIORAL HEALTH - OTHER
looks older than stated age unable to ascertain at this time n/a - sleeping unable to engage in meaningful communication deferred at this time continuous moaning/yelling

## 2019-06-10 NOTE — PROGRESS NOTE BEHAVIORAL HEALTH - SUMMARY
NEW RECOMMENDATIONS  - increase standing Ativan 1mg PO bid via NGT to 1.5mg PO bid given continues agitated and need for PRNs

## 2019-06-10 NOTE — CHART NOTE - NSCHARTNOTEFT_GEN_A_CORE
Assessment: Pt seen for nutrition follow-up. Pt admitted with CVA.  PMH rectal CA, RD observed pt agitated and making loud moans. RD able calm pt with soft voice tones, pt unable to answer questions at this time. Pt with NGT tolerating tube feeding well with no distress. SLP re-eval 06/10 failed swallow-eval, uncooperative with SLP. Per chart review MD discussed PEG placement for long-term nutrition support access.      Factors impacting intake: [ ] none [ ] nausea  [ ] vomiting [ ] diarrhea [ ] constipation  [ ]chewing problems [ ] swallowing issues  [ x ] other: cognitive limitations      Diet Prescription: Diet, Dysphagia 1 Pureed-Nectar Consistency Fluid:   Tube Feeding Modality: Nasogastric  Jevity 1.2 Martin  Total Volume for 24 Hours (mL): 1320  Continuous  Starting Tube Feed Rate {mL per Hour}: 55  Until Goal Tube Feed Rate (mL per Hour): 55  Tube Feed Duration (in Hours): 24  Tube Feed Start Time: 10:00 (06-08-19 @ 10:00)    Intake:   Total Volume Received in 24 hrs: 1124 mL  Current rate @: 55mL/hr   Nutrition received in 24 hr: 1350 kcal,  62 grams of protein,  910 mL free H2O   85% RDI's met     Current Weight: 52.4 kg (06-03); 56.2 kg (06-10)  % Weight Change: 7% gain (8) desirable- rehydration + increase protein-energy via tube feeding     Physical appearance: BMI 22.5; no edema note; unable physical exam pt notably agitated     Pertinent Medications: MEDICATIONS  (STANDING):  aspirin Suppository 300 milliGRAM(s) Rectal daily  dextrose 5%. 1000 milliLiter(s) (50 mL/Hr) IV Continuous <Continuous>  dextrose 50% Injectable 12.5 Gram(s) IV Push once  dextrose 50% Injectable 25 Gram(s) IV Push once  dextrose 50% Injectable 25 Gram(s) IV Push once  heparin  Injectable 5000 Unit(s) SubCutaneous every 8 hours  hydrALAZINE 25 milliGRAM(s) Oral three times a day  lisinopril 20 milliGRAM(s) Oral daily  LORazepam     Tablet 1 milliGRAM(s) Oral two times a day  metoprolol tartrate 25 milliGRAM(s) Oral two times a day  mirtazapine 7.5 milliGRAM(s) Oral at bedtime  piperacillin/tazobactam IVPB. 3.375 Gram(s) IV Intermittent every 8 hours    MEDICATIONS  (PRN):  acetaminophen  Suppository .. 650 milliGRAM(s) Rectal every 6 hours PRN Temp greater or equal to 38C (100.4F), Mild Pain (1 - 3)  dextrose 40% Gel 15 Gram(s) Oral once PRN Blood Glucose LESS THAN 70 milliGRAM(s)/deciLiter  glucagon  Injectable 1 milliGRAM(s) IntraMuscular once PRN Glucose <70 milliGRAM(s)/deciLiter  hydrALAZINE Injectable 10 milliGRAM(s) IV Push every 6 hours PRN SBP GREATER THAN 160  LORazepam     Tablet 0.5 milliGRAM(s) Oral every 6 hours PRN anxiety / mild agitation  LORazepam   Injectable 0.5 milliGRAM(s) IV Push every 6 hours PRN severe agitation    Pertinent Labs: 06-08 Na 147 mmol/L<H> Glu 138 mg/dL<H> K+ 4.7 mmol/L Cr 0.80 mg/dL BUN 8 mg/dL Phos n/a   Alb n/a   PAB n/a   Hgb 9.4 g/dL<L> Hct 29.4 %<L> HgA1C n/a     24Hr FS:131 mg/dL    Skin: intact     Estimated Needs:   [ x ] no change since previous assessment (06/03)  [ ] recalculated:     Previous Nutrition Diagnosis:     · Oral or Nutrition Support Intake: Inadequate oral intake  · Etiology: altered GI status- s/p CVA  dysphagia & altered mental status in the context of confusion and agitation  · Signs/Symptoms: inability to self-feed, refusing feedings    · Enteral Nutrition: Composition; Concentration; Rate; Volume; Schedule; Route; Insert enteral feeding tube; If PO not medically feasible consider EN via PEG:   · Goal/Expected Outcome: Pt to meet >75% of protein-energy needs via PEG during hospitalization; maintain wt +/-2#      Nutrition Diagnosis is [ ] ongoing  [ ] resolved  [ ] improved  [ ] not applicable   Previous Goal:     New Nutrition Diagnosis: [ ] not applicable       Interventions:   Recommend  [x ] Continue:  [ ] Change Diet To:  [ ] Nutrition Supplement:  [x ] Nutrition Support: Jevity 1.2; total volume 1320mL/24hr; Goal Rate 55mL/hr (Goal provides 1580kcal,  80 grams of pro, 1100mL free H2O)   [x] Other: + Provided 150mL H2O q6h    Monitoring and Evaluation:   [ x ] PO intake [ x ] Tolerance to diet prescription [ x ] weights [ x ] labs[ x ] follow up per protocol  [ ] other: Assessment: Pt seen for nutrition follow-up. Pt admitted with CVA.  PMH rectal CA, RD observed pt agitated and making loud moans. RD able calm pt with soft voice tones, pt unable to answer questions at this time. Pt with NGT tolerating tube feeding well with no distress. SLP re-eval 06/10 failed swallow-eval, uncooperative with SLP. Per chart review MD discussed PEG placement for long-term nutrition support access.      Factors impacting intake: [ ] none [ ] nausea  [ ] vomiting [ ] diarrhea [ ] constipation  [ ]chewing problems [ ] swallowing issues  [ x ] other: cognitive limitations      Diet Prescription: Diet, Dysphagia 1 Pureed-Nectar Consistency Fluid:   Tube Feeding Modality: Nasogastric  Jevity 1.2 Martin  Total Volume for 24 Hours (mL): 1320  Continuous  Starting Tube Feed Rate {mL per Hour}: 55  Until Goal Tube Feed Rate (mL per Hour): 55  Tube Feed Duration (in Hours): 24  Tube Feed Start Time: 10:00 (06-08-19 @ 10:00)    Intake:   Total Volume Received in 24 hrs: 1124 mL  Current rate @: 55mL/hr   Nutrition received in 24 hr: 1350 kcal,  62 grams of protein,  910 mL free H2O   85% RDI's met     Current Weight: 52.4 kg (06-03); 56.2 kg (06-10)  % Weight Change: 7% gain (8) desirable- rehydration + increase protein-energy via tube feeding     Physical appearance: BMI 22.5; no edema note; unable physical exam pt notably agitated     Pertinent Medications: MEDICATIONS  (STANDING):  aspirin Suppository 300 milliGRAM(s) Rectal daily  dextrose 5%. 1000 milliLiter(s) (50 mL/Hr) IV Continuous <Continuous>  dextrose 50% Injectable 12.5 Gram(s) IV Push once  dextrose 50% Injectable 25 Gram(s) IV Push once  dextrose 50% Injectable 25 Gram(s) IV Push once  heparin  Injectable 5000 Unit(s) SubCutaneous every 8 hours  hydrALAZINE 25 milliGRAM(s) Oral three times a day  lisinopril 20 milliGRAM(s) Oral daily  LORazepam     Tablet 1 milliGRAM(s) Oral two times a day  metoprolol tartrate 25 milliGRAM(s) Oral two times a day  mirtazapine 7.5 milliGRAM(s) Oral at bedtime  piperacillin/tazobactam IVPB. 3.375 Gram(s) IV Intermittent every 8 hours    MEDICATIONS  (PRN):  acetaminophen  Suppository .. 650 milliGRAM(s) Rectal every 6 hours PRN Temp greater or equal to 38C (100.4F), Mild Pain (1 - 3)  dextrose 40% Gel 15 Gram(s) Oral once PRN Blood Glucose LESS THAN 70 milliGRAM(s)/deciLiter  glucagon  Injectable 1 milliGRAM(s) IntraMuscular once PRN Glucose <70 milliGRAM(s)/deciLiter  hydrALAZINE Injectable 10 milliGRAM(s) IV Push every 6 hours PRN SBP GREATER THAN 160  LORazepam     Tablet 0.5 milliGRAM(s) Oral every 6 hours PRN anxiety / mild agitation  LORazepam   Injectable 0.5 milliGRAM(s) IV Push every 6 hours PRN severe agitation    Pertinent Labs: 06-08 Na 147 mmol/L<H> Glu 138 mg/dL<H> K+ 4.7 mmol/L Cr 0.80 mg/dL BUN 8 mg/dL Phos n/a   Alb n/a   PAB n/a   Hgb 9.4 g/dL<L> Hct 29.4 %<L> HgA1C n/a     24Hr FS:131 mg/dL    Skin: intact     Estimated Needs:   [ x ] no change since previous assessment (06/03)  [ ] recalculated:     Previous Nutrition Diagnosis (06/03):     · Oral or Nutrition Support Intake: Inadequate oral intake  · Etiology: altered GI status- s/p CVA  dysphagia & altered mental status in the context of confusion and agitation  · Signs/Symptoms: inability to self-feed, refusing feedings    · Previous Goal/Expected Outcome: Pt to meet >75% of protein-energy needs via PEG during hospitalization; maintain wt +/-2# (met--> continuing goal)       Nutrition Diagnosis is [ x ] ongoing  [ ] resolved  [ x ] improved  [ ] not applicable       New Nutrition Diagnosis: [x ] not applicable       Interventions:   Recommend  [x ] Continue:  [ ] Change Diet To:  [ ] Nutrition Supplement:  [x ] Nutrition Support: Jevity 1.2; total volume 1320mL/24hr; Goal Rate 55mL/hr (Goal provides 1580kcal,  80 grams of pro, 1100mL free H2O)   [x] Other: + Provided 150mL H2O q6h    Monitoring and Evaluation:   [ x ] PO intake [ x ] Tolerance to diet prescription [ x ] weights [ x ] labs[ x ] follow up per protocol  [ ] other:

## 2019-06-10 NOTE — SWALLOW BEDSIDE ASSESSMENT ADULT - SWALLOW EVAL: RECOMMENDED FEEDING/EATING TECHNIQUES
check mouth frequently for oral residue/pocketing/crush medication (when feasible)/maintain upright posture during/after eating for 30 mins/allow for swallow between intakes/small sips/bites
oral hygiene

## 2019-06-10 NOTE — SWALLOW BEDSIDE ASSESSMENT ADULT - H & P REVIEW
yes/yes  74 year old woman with PMH rectal cancer s/p colostomy BIBEMS and found to have CVA.  Pt cannot provide history due to neuro deficit.  Per daughter at bedside, patient had complained of right-sided weakness earlier in the day.  She did not  her phone when called then was found lying on the floor at home.  Pt not a candidate for tPA due to unknown timing of symptoms.  Pt cannot provide history and is very agitated, yelling and crying, pulling at IV lines, climbing from bed

## 2019-06-10 NOTE — SWALLOW BEDSIDE ASSESSMENT ADULT - SWALLOW EVAL: DIAGNOSIS
pt presented with oropharyngeal phases of swallow marked by reduced cognition and refusal of PO trials therefore limiting the evaluation. pt presented with oropharyngeal phases of swallow marked by reduced cognition and refusal of PO trials therefore limiting the evaluation. decreased oral opening, reduced A-P oral transport, SLP cleared oral cavity. suggest pt not safe for PO diet at this time, at high risk for aspiration, dehydration, and malnutrition. pt presented with oropharyngeal phases of swallow marked by reduced cognition and refusal of PO trials therefore limiting the evaluation. decreased oral opening, anterior loss- and pt expectorated bolus, reduced A-P oral transport with oral holding- SLP cleared residue for oral cavity. suggest pt not safe for PO diet at this time, at high risk for aspiration, dehydration, and malnutrition.

## 2019-06-10 NOTE — SWALLOW BEDSIDE ASSESSMENT ADULT - SLP GENERAL OBSERVATIONS
pt seen bedside. pt not alert and oriented. pt seen bedside. pt not alert and oriented. pt unable to respond to questions and follow directions. pt seen bedside. pt fairly alert for brief periods given tactile/auditory prompts. may be 2/2 medication. pt did not respond to simple questions, essentially nonverbal except gestures and refusal behavior. pt did not follow directions or models and noted fleeting eye contact. pt seen bedside. pt fairly alert for brief periods given tactile/auditory prompts, may be 2/2 medication. pt did not respond to simple questions, essentially nonverbal except gestures and refusal behavior. pt did not follow directions or models and noted fleeting eye contact.

## 2019-06-10 NOTE — SWALLOW BEDSIDE ASSESSMENT ADULT - SWALLOW EVAL: CURRENT DIET
nasogastric tube, dysphagia 1 pureed nectar thick liquid NGtube,  dysphagia 1 pureed nectar thick liquid

## 2019-06-10 NOTE — PROGRESS NOTE BEHAVIORAL HEALTH - NSBHFUPINTERVALHXFT_PSY_A_CORE
Patient lying in bed awake, moaning and yelling in a monotonous tone with mouth agape. Does not engage in a meaningful manner or make significant eye contact. Unable to communicate needs. Patient seems to be tolerating benzos without any discernible adverse medication side effects.

## 2019-06-10 NOTE — SWALLOW BEDSIDE ASSESSMENT ADULT - SWALLOW EVAL: ORAL MUSCULATURE
unable to assess due to poor participation/comprehension
asymmetrical/unable to assess due to poor participation/comprehension

## 2019-06-10 NOTE — SWALLOW BEDSIDE ASSESSMENT ADULT - SWALLOW EVAL: FUNCTIONAL LEVEL AT TIME OF EVAL
pt barely alert given maximal tactile/auditory stimuli. poor PO acceptance. pt fair alertness given maximal tactile/auditory stimuli. poor PO acceptance. pt fair alertness given maximal tactile/auditory stimuli with poor PO acceptance.

## 2019-06-10 NOTE — SWALLOW BEDSIDE ASSESSMENT ADULT - SWALLOW EVAL: SECRETION MANAGEMENT
problems swallowing secretions problems swallowing secretions/pooling/left corner drooling/right corner drooling problems swallowing secretions/pooling

## 2019-06-10 NOTE — PROGRESS NOTE ADULT - SUBJECTIVE AND OBJECTIVE BOX
Afeb VSS  Sedated, periodically agitated  NG feeding  Colostomy empty  CVA  AMS  psychosis  Dysphagia  Episode of > T p feeding initiation  Swallow reeval   > PEG  Neurology to f/u

## 2019-06-10 NOTE — SWALLOW BEDSIDE ASSESSMENT ADULT - ORAL PHASE
pt expectorated secretions and PO trial by blocking with teeth. Decreased anterior-posterior movement of the bolus

## 2019-06-10 NOTE — PROGRESS NOTE ADULT - SUBJECTIVE AND OBJECTIVE BOX
Subjective Complaints:  Historian:     patient     REVIEW OF SYSTEMS:  Eyes:  Good vision, no reported pain  ENT:  No sore throat, pain, runny nose, dysphagia  CV:  No pain, palpitatioins, hypo/hypertension  Resp:  No dyspnea, cough, tachypnea, wheezing  GI:  No pain, nausea, vomiting, diarrhea, constipatiion  Muscle:  No pain, weakness  Neuro:  No weakness, tingling, memory problems  Psych:  No fatigue, insomnia, mood problems, depression  Endocrine:  No polyuria, polydypsia, cold/heat intolerance    Vital Signs Last 24 Hrs  T(C): 36.3 (10 Davie 2019 04:15), Max: 37.1 (09 Jun 2019 23:13)  T(F): 97.3 (10 Davie 2019 04:15), Max: 98.8 (09 Jun 2019 23:13)  HR: 75 (10 Davie 2019 04:15) (70 - 80)  BP: 136/68 (10 Davie 2019 05:23) (114/72 - 161/65)  BP(mean): --  RR: 19 (10 Davie 2019 04:15) (18 - 19)  SpO2: 97% (10 Davie 2019 04:15) (96% - 99%)    GENERAL PHYSICAL EXAM:  General:  Appears stated age, well-groomed, well-nourished, no distress  HEENT:  NC/AT, patent nares w/ pink mucosa, OP clear w/o lesions, PERRL, EOMI, conjunctivae clear, no thyromegaly, nodules, adenopathy, no JVD  Chest:  Full & symmetric excursion, no increased effort, breath sounds clear  Cardiovascular:  Regular rhythm, S1, S2, no murmur/rub/S3/S4, no carotid/femoral/abdominal bruit, radial/pedal pulses 2+, no edema  Abdomen:  Soft, non-tender, non-distended, normoactive bowel sounds, no HSM  Extremities:  Gait & station:   Digits:   Nails:   Joints, Bones, Muscles:   ROM:   Stability:  Skin:  No rash/erythema/ecchymoses/petechiae/wounds/abscess/warm/dry  Musculoskeletal:  Full ROM in all joints w/o swelling/tenderness/effusion    NEUROLOGICAL EXAM:  HENT:  Normocephalic head; atraumatic head.  Neck supple.  ENT: normal looking.  Mental State:    Awake   oriented to person,  Coherent.  Speech : word  finding difficulty   Cooperative.  Responds appropriately.    Cranial Nerves:  II-XII:   Pupils round and reactive to light and accommodation.  Extraocular movements full.  Visual fields full (no homonymous hemianopsia).  Visual acuity wnl.  Facial symmetry intact. swallowing reflexe is impaired  Tongue midline.  Motor Functions:  Moves all extremities.  No pronator drift of UE.  Claps hand well.  Hand  intact bilaterally.  Ambulatory.    Sensory Functions:   Intact to touch and pinprick to face and extremities.    Reflexes:  Deep tendon reflexes normoactive to biceps, knees and ankles.  Babinski absent (present).  Cerebellar Testing:    Finger to nose intact.  Nystagmus absent.  Neurovascular: Carotid auscultation full without bruits.      LABS:                  RADIOLOGY & ADDITIONAL STUDIES:    NonViolent NonSelf-Destructive Level One Restraint:   BEHAVIOR/REASON:;  Medical Management  TYPE OF RESTRAINT:;  Right Unsecured Mitten;  Left Unsecured Mitten  ORDER DURATION:  24 Hours  BEHAVIOR DISCONTINUATION CRITERIA:   Patient will Cease Engaging In/Threatening: Pulling at Tubes/Lines and Drains (06-09 @ 13:00)  POCT  Blood Glucose: 17:18 (06-09 @ 17:19)  Consult- Speech Bedside Swallow Evaluation:   *Reason for Consult - Must select at least one choice*     History of Dysphagia  Additional Information: cva (06-10 @ 08:03)  Complete Blood Count: AM Sched. Collection: 11-Jun-2019 05:00 (06-10 @ 08:03)  Basic Metabolic Panel: AM Sched. Collection: 11-Jun-2019 05:00 (06-10 @ 08:03)  Consult- PT Evaluate and Treat:   *Reason for Consult - Must select at least one choice*     Short Term Rehab  Weight Bearing Restrictions: No (06-10 @ 08:03)  DX Left MCA TERRITORY INFARCT --DYSPHAGIA --PSYCHOSIS     Recommendations:    EEG.   DVT prophylaxis as ordered.  Medications:  CONTINUE MED

## 2019-06-10 NOTE — SWALLOW BEDSIDE ASSESSMENT ADULT - SWALLOW EVAL: CRITERIA FOR SKILLED INTERVENTION MET
demonstrates skilled criteria for swallowing intervention current level of function same as previous level of function

## 2019-06-10 NOTE — SWALLOW BEDSIDE ASSESSMENT ADULT - COMMENTS
MRI Head 6/2/2019 1)  Limited study degraded by motion. Acute ischemia is documented with diffusion restriction in the left MCA distribution. CTA imaging may be considered for further assessment. 2)  chronic ischemic changes also noted in both hemispheres with volume loss. MRI Head 6/2/2019 1)  Limited study degraded by motion. Acute ischemia is documented with diffusion restriction in the left MCA distribution. CTA imaging may be considered for further assessment. 2)  chronic ischemic changes also noted in both hemispheres with volume loss. 5/30/2019 CT Head No acute intracranial hemorrhage. Hyperdensity in the left sylvian inferior division middle cerebral artery M2 branches reflective of thrombus. Evidence of acute infarct involving the left posterior insula and high left parietal cortex. Moderate to severe chronic small vessel ischemic changes in the frontoparietal white matter. Critical findings were discussed with Dr. Crowley of the emergency Department at 11:55 PM on 5/30/2019.

## 2019-06-11 DIAGNOSIS — I69.391 DYSPHAGIA FOLLOWING CEREBRAL INFARCTION: ICD-10-CM

## 2019-06-11 LAB
ANION GAP SERPL CALC-SCNC: 6 MMOL/L — SIGNIFICANT CHANGE UP (ref 5–17)
BUN SERPL-MCNC: 9 MG/DL — SIGNIFICANT CHANGE UP (ref 7–23)
CALCIUM SERPL-MCNC: 9.4 MG/DL — SIGNIFICANT CHANGE UP (ref 8.5–10.1)
CHLORIDE SERPL-SCNC: 109 MMOL/L — HIGH (ref 96–108)
CO2 SERPL-SCNC: 29 MMOL/L — SIGNIFICANT CHANGE UP (ref 22–31)
CREAT SERPL-MCNC: 0.8 MG/DL — SIGNIFICANT CHANGE UP (ref 0.5–1.3)
GLUCOSE BLDC GLUCOMTR-MCNC: 119 MG/DL — HIGH (ref 70–99)
GLUCOSE BLDC GLUCOMTR-MCNC: 126 MG/DL — HIGH (ref 70–99)
GLUCOSE BLDC GLUCOMTR-MCNC: 96 MG/DL — SIGNIFICANT CHANGE UP (ref 70–99)
GLUCOSE BLDC GLUCOMTR-MCNC: 98 MG/DL — SIGNIFICANT CHANGE UP (ref 70–99)
GLUCOSE BLDC GLUCOMTR-MCNC: 99 MG/DL — SIGNIFICANT CHANGE UP (ref 70–99)
GLUCOSE SERPL-MCNC: 110 MG/DL — HIGH (ref 70–99)
HCT VFR BLD CALC: 29 % — LOW (ref 34.5–45)
HGB BLD-MCNC: 9.2 G/DL — LOW (ref 11.5–15.5)
MCHC RBC-ENTMCNC: 29.4 PG — SIGNIFICANT CHANGE UP (ref 27–34)
MCHC RBC-ENTMCNC: 31.7 GM/DL — LOW (ref 32–36)
MCV RBC AUTO: 92.7 FL — SIGNIFICANT CHANGE UP (ref 80–100)
NRBC # BLD: 0 /100 WBCS — SIGNIFICANT CHANGE UP (ref 0–0)
PLATELET # BLD AUTO: 301 K/UL — SIGNIFICANT CHANGE UP (ref 150–400)
POTASSIUM SERPL-MCNC: 4.7 MMOL/L — SIGNIFICANT CHANGE UP (ref 3.5–5.3)
POTASSIUM SERPL-SCNC: 4.7 MMOL/L — SIGNIFICANT CHANGE UP (ref 3.5–5.3)
RBC # BLD: 3.13 M/UL — LOW (ref 3.8–5.2)
RBC # FLD: 14.8 % — HIGH (ref 10.3–14.5)
SODIUM SERPL-SCNC: 144 MMOL/L — SIGNIFICANT CHANGE UP (ref 135–145)
WBC # BLD: 9.02 K/UL — SIGNIFICANT CHANGE UP (ref 3.8–10.5)
WBC # FLD AUTO: 9.02 K/UL — SIGNIFICANT CHANGE UP (ref 3.8–10.5)

## 2019-06-11 PROCEDURE — 99232 SBSQ HOSP IP/OBS MODERATE 35: CPT

## 2019-06-11 RX ADMIN — MIRTAZAPINE 7.5 MILLIGRAM(S): 45 TABLET, ORALLY DISINTEGRATING ORAL at 21:39

## 2019-06-11 RX ADMIN — Medication 25 MILLIGRAM(S): at 21:39

## 2019-06-11 RX ADMIN — Medication 25 MILLIGRAM(S): at 17:06

## 2019-06-11 RX ADMIN — Medication 0.5 MILLIGRAM(S): at 13:24

## 2019-06-11 RX ADMIN — PIPERACILLIN AND TAZOBACTAM 25 GRAM(S): 4; .5 INJECTION, POWDER, LYOPHILIZED, FOR SOLUTION INTRAVENOUS at 21:38

## 2019-06-11 RX ADMIN — Medication 300 MILLIGRAM(S): at 12:16

## 2019-06-11 RX ADMIN — Medication 25 MILLIGRAM(S): at 13:24

## 2019-06-11 RX ADMIN — Medication 25 MILLIGRAM(S): at 05:35

## 2019-06-11 RX ADMIN — PIPERACILLIN AND TAZOBACTAM 25 GRAM(S): 4; .5 INJECTION, POWDER, LYOPHILIZED, FOR SOLUTION INTRAVENOUS at 05:35

## 2019-06-11 RX ADMIN — HEPARIN SODIUM 5000 UNIT(S): 5000 INJECTION INTRAVENOUS; SUBCUTANEOUS at 05:34

## 2019-06-11 RX ADMIN — LISINOPRIL 20 MILLIGRAM(S): 2.5 TABLET ORAL at 05:35

## 2019-06-11 RX ADMIN — Medication 1.5 MILLIGRAM(S): at 17:05

## 2019-06-11 RX ADMIN — HEPARIN SODIUM 5000 UNIT(S): 5000 INJECTION INTRAVENOUS; SUBCUTANEOUS at 13:24

## 2019-06-11 RX ADMIN — PIPERACILLIN AND TAZOBACTAM 25 GRAM(S): 4; .5 INJECTION, POWDER, LYOPHILIZED, FOR SOLUTION INTRAVENOUS at 13:24

## 2019-06-11 RX ADMIN — HEPARIN SODIUM 5000 UNIT(S): 5000 INJECTION INTRAVENOUS; SUBCUTANEOUS at 21:40

## 2019-06-11 RX ADMIN — Medication 1.5 MILLIGRAM(S): at 05:35

## 2019-06-11 NOTE — CONSULT NOTE ADULT - SUBJECTIVE AND OBJECTIVE BOX
Chief Complaint:  Patient is a 74y old  Female who presents with a chief complaint of CVA (2019 10:10)      HPI:  74 year old woman with PMH rectal cancer s/p colostomy BIBEMS and found to have CVA.  Pt cannot provide history due to neuro deficit.  Per daughter at bedside, patient had complained of right-sided weakness earlier in the day.  She did not  her phone when called then was found lying on the floor at home.  Pt not a candidate for tPA due to unknown timing of symptoms.  Pt cannot provide history and is very agitated, yelling and crying, pulling at IV lines, climbing from bed.  Called to evaluate for dysphagia and Possible Peg tube placement      PMH/PSH:PAST MEDICAL & SURGICAL HISTORY:  Shingles (herpes zoster) polyneuropathy  Rectal cancer  Port catheter in place: infusaport.  Chemotherapy follow-up examination: infusa port  S/P colostomy: for rectal ca. in 2/10/2012.  S/P colectomy: after colonoscopy in 10/4/2011.  S/P colonoscopy: .      Allergies:  No Known Allergies      Medications:  acetaminophen  Suppository .. 650 milliGRAM(s) Rectal every 6 hours PRN  aspirin Suppository 300 milliGRAM(s) Rectal daily  dextrose 40% Gel 15 Gram(s) Oral once PRN  dextrose 5%. 1000 milliLiter(s) IV Continuous <Continuous>  dextrose 50% Injectable 12.5 Gram(s) IV Push once  dextrose 50% Injectable 25 Gram(s) IV Push once  dextrose 50% Injectable 25 Gram(s) IV Push once  glucagon  Injectable 1 milliGRAM(s) IntraMuscular once PRN  heparin  Injectable 5000 Unit(s) SubCutaneous every 8 hours  hydrALAZINE 25 milliGRAM(s) Oral three times a day  hydrALAZINE Injectable 10 milliGRAM(s) IV Push every 6 hours PRN  lisinopril 20 milliGRAM(s) Oral daily  LORazepam     Tablet 0.5 milliGRAM(s) Oral every 6 hours PRN  LORazepam     Tablet 1.5 milliGRAM(s) Oral two times a day  LORazepam   Injectable 1 milliGRAM(s) IV Push every 6 hours PRN  metoprolol tartrate 25 milliGRAM(s) Oral two times a day  mirtazapine 7.5 milliGRAM(s) Oral at bedtime  piperacillin/tazobactam IVPB. 3.375 Gram(s) IV Intermittent every 8 hours      REVIEW OF SYSTEMS:  All other review of systems is negative unless indicated above.    Relevant Family History:   FAMILY HISTORY:  No pertinent family history in first degree relatives      Relevant Social History:  Denies ETOH or tobacco history    Physical Exam:    Vital Signs:  Vital Signs Last 24 Hrs  T(C): 36.9 (2019 11:08), Max: 37.7 (2019 05:01)  T(F): 98.5 (2019 11:08), Max: 99.8 (2019 05:01)  HR: 68 (2019 11:08) (68 - 90)  BP: 141/47 (2019 11:08) (122/63 - 175/62)  BP(mean): --  RR: 18 (2019 11:08) (18 - 18)  SpO2: 97% (2019 11:08) (97% - 100%)  Daily     Daily Weight in k.2 (2019 05:01)    Constitutional: Frail BF  HEENT: NC/AT, PERRL, EOMI, anicteric sclera, no nasal discharge; uvula midline, no oropharyngeal erythema or exudates, NGT (feeding)  Neck: supple; no JVD or thyromegaly  Respiratory: CTA B/L; no W/R/R, no retractions  Cardiac: +S1/S2; RRR; no M/R/G; PMI non-displaced  Gastrointestinal: soft, NT/ND; Left Colostomy with stool in bag no rebound or guarding; +BS   Extremities: , no clubbing or cyanosis; no peripheral edema  Musculoskeletal:  no joint swelling, tenderness or erythema  Vascular: 2+ radial, femoral, DP/PT pulses B/L  Skin: warm, dry and intact; no rashes, wounds, or scars  Neurologic: Alert to person; CNS grossly intact; no focal deficits no asterixis, no tremor, no encephalopathy    Laboratory:                          9.2    9.02  )-----------( 301      ( 2019 07:24 )             29.0     06-11    144  |  109<H>  |  9   ----------------------------<  110<H>  4.7   |  29  |  0.80    Ca    9.4      2019 07:24        Intake and Output    19 @ 07:01  -  19 @ 12:41  --------------------------------------------------------  IN: 0 mL / OUT: 800 mL / NET: -800 mL

## 2019-06-11 NOTE — PROGRESS NOTE BEHAVIORAL HEALTH - NSBHFUPINTERVALHXFT_PSY_A_CORE
Patient is not yelling in bed today, dozing off, seems calm and comfortable. Decreased use of PRNs since Ativan dose increase.

## 2019-06-11 NOTE — CONSULT NOTE ADULT - ASSESSMENT
Verified Results  MA FFDM SCREEN ANGEL W SADIQ W CAD [id number] 11:01AM GREG WHITTINGTON   Ordering Provider: GREG WHITTINGTON.    Reason For Study: screening,screening.   [Jul 21, 2017 3:50PM GREG WHITTINGTON]  benign     Test Name Result Flag Reference   MA FFDM SCREEN ANGEL W SADIQ W CAD (Report)     Accession #    [id number]    #[id number] - MA FFDM SCREEN ANGEL W SADIQ W CAD    BILATERAL DIGITAL SCREENING MAMMOGRAM 3D/2D WITH CAD WITH MEDIOLATERAL OBLIQUE CRANIOCAUDAL:   7/21/2017    CLINICAL HISTORY:Routine Screening.    COMPARISON:   Comparison is made to exams dated: 1/26/2015 mammogram, 3/27/2013 mammogram, 5/25/2011   mammogram - [facility], 9/16/2009 mammogram, 8/14/2008 mammogram,   and 8/13/2007 mammogram - [facility].    FINDINGS:  There are scattered fibroglandular elements in both breasts.    No significant masses, calcifications, or other findings are seen in either breast.  Current study was also evaluated with a Computer Aided Detection (CAD) system. Digital Breast   Tomosynthesis (DBT) images were obtained and used to assist in the interpretation of this   examination.  There has been no significant interval change.    IMPRESSION: BENIGN    There is no mammographic evidence of malignancy. A 1 year screening mammogram is recommended.    MAMMOGRAPHY BI-RADS: 2 BENIGN    Evonne millard/penrad:7/21/2017 13:00:25  copy to: PER PT NONE    Imaging Technologist: RT Levy(R)(M), [facility]  letter sent: Normal Single Exam     **** FINAL ****    Dictated By:   EVONNE VASQUEZ    Electronically Reviewed and Approved By:  EVONNE VASQUEZ        73y/o BF with Dyspagia

## 2019-06-11 NOTE — CONSULT NOTE ADULT - PROBLEM SELECTOR RECOMMENDATION 9
Risks, benefits and alternatives discussed at length with patients Daughter  regarding PEG tube placement. All questions were answered.   Daughter will be in today to sign consent. Will schedule peg accordingly.

## 2019-06-11 NOTE — PROGRESS NOTE ADULT - SUBJECTIVE AND OBJECTIVE BOX
Patient is a 74y old  Female who presents with a chief complaint of CVA (10 Davie 2019 10:57)      INTERVAL HPI/OVERNIGHT EVENTS: uncomfortable moaning    MEDICATIONS  (STANDING):  aspirin Suppository 300 milliGRAM(s) Rectal daily  dextrose 5%. 1000 milliLiter(s) (50 mL/Hr) IV Continuous <Continuous>  dextrose 50% Injectable 12.5 Gram(s) IV Push once  dextrose 50% Injectable 25 Gram(s) IV Push once  dextrose 50% Injectable 25 Gram(s) IV Push once  heparin  Injectable 5000 Unit(s) SubCutaneous every 8 hours  hydrALAZINE 25 milliGRAM(s) Oral three times a day  lisinopril 20 milliGRAM(s) Oral daily  LORazepam     Tablet 1.5 milliGRAM(s) Oral two times a day  metoprolol tartrate 25 milliGRAM(s) Oral two times a day  mirtazapine 7.5 milliGRAM(s) Oral at bedtime  piperacillin/tazobactam IVPB. 3.375 Gram(s) IV Intermittent every 8 hours    MEDICATIONS  (PRN):  acetaminophen  Suppository .. 650 milliGRAM(s) Rectal every 6 hours PRN Temp greater or equal to 38C (100.4F), Mild Pain (1 - 3)  dextrose 40% Gel 15 Gram(s) Oral once PRN Blood Glucose LESS THAN 70 milliGRAM(s)/deciLiter  glucagon  Injectable 1 milliGRAM(s) IntraMuscular once PRN Glucose <70 milliGRAM(s)/deciLiter  hydrALAZINE Injectable 10 milliGRAM(s) IV Push every 6 hours PRN SBP GREATER THAN 160  LORazepam     Tablet 0.5 milliGRAM(s) Oral every 6 hours PRN mild anxiety  LORazepam   Injectable 1 milliGRAM(s) IV Push every 6 hours PRN severe agitation      Allergies    No Known Allergies    Intolerances        REVIEW OF SYSTEMS:            Vital Signs Last 24 Hrs  T(C): 37.7 (11 Jun 2019 05:01), Max: 37.7 (11 Jun 2019 05:01)  T(F): 99.8 (11 Jun 2019 05:01), Max: 99.8 (11 Jun 2019 05:01)  HR: 78 (11 Jun 2019 05:01) (71 - 90)  BP: 153/50 (11 Jun 2019 05:01) (122/63 - 175/62)  BP(mean): --  RR: 18 (11 Jun 2019 05:01) (18 - 18)  SpO2: 98% (11 Jun 2019 05:01) (97% - 100%)    PHYSICAL EXAM:  general       HEENTng tube feeding  CHEST/LUNG: scattered conductive rhonchi  HEART: Regular rate and rhythm; No murmurs, rubs, or gallops  ABDOMEN: Soft, Nontender, Nondistended; Bowel sounds present  EXTREMITIES:  2+ Peripheral Pulses, No clubbing, cyanosis, or edema  LYMPH: No lymphadenopathy noted  SKIN: No rashes or lesions  confused     LABS:                        9.2    9.02  )-----------( 301      ( 11 Jun 2019 07:24 )             29.0     06-11    144  |  109<H>  |  9   ----------------------------<  110<H>  4.7   |  29  |  0.80    Ca    9.4      11 Jun 2019 07:24          CAPILLARY BLOOD GLUCOSE      POCT Blood Glucose.: 99 mg/dL (11 Jun 2019 06:52)  POCT Blood Glucose.: 96 mg/dL (11 Jun 2019 01:37)                RADIOLOGY & ADDITIONAL TESTS:  Swallow reevaluation - NPO    Imaging Personally Reviewed:  [y ] YES  [ ] NO    Consultant(s) Notes Reviewed:  [y ] YES  [ ] NO    Care Discussed with Consultants/Other Providers [ ] YES  [ ] NO    PROBLEMS:  CEREBRAL VASCULAR ACCIDENT,ELEVATED TROPONIN LEVEL ONE  WEAKNESS  CVA (cerebral vascular accident)  Delirium due to another medical condition, acute, hyperactive  Preventive measure  Rectal cancer  Elevated troponin I level  Cerebrovascular accident (CVA), unspecified mechanism      Care discussed with family,         [x  ]   yes  [  ]  No    imp:    stable[ ]    unstable[  ]     improving [   ]       unchanged  [  ]                Plans:  discussed c daughter, agreed c PEG placement

## 2019-06-12 LAB
GLUCOSE BLDC GLUCOMTR-MCNC: 100 MG/DL — HIGH (ref 70–99)
GLUCOSE BLDC GLUCOMTR-MCNC: 123 MG/DL — HIGH (ref 70–99)

## 2019-06-12 PROCEDURE — 99232 SBSQ HOSP IP/OBS MODERATE 35: CPT

## 2019-06-12 RX ADMIN — HEPARIN SODIUM 5000 UNIT(S): 5000 INJECTION INTRAVENOUS; SUBCUTANEOUS at 14:22

## 2019-06-12 RX ADMIN — Medication 1.5 MILLIGRAM(S): at 21:08

## 2019-06-12 RX ADMIN — MIRTAZAPINE 7.5 MILLIGRAM(S): 45 TABLET, ORALLY DISINTEGRATING ORAL at 21:08

## 2019-06-12 RX ADMIN — Medication 300 MILLIGRAM(S): at 11:05

## 2019-06-12 RX ADMIN — Medication 1 MILLIGRAM(S): at 00:38

## 2019-06-12 RX ADMIN — Medication 0.5 MILLIGRAM(S): at 12:04

## 2019-06-12 RX ADMIN — Medication 1 MILLIGRAM(S): at 20:21

## 2019-06-12 RX ADMIN — Medication 25 MILLIGRAM(S): at 17:23

## 2019-06-12 RX ADMIN — PIPERACILLIN AND TAZOBACTAM 25 GRAM(S): 4; .5 INJECTION, POWDER, LYOPHILIZED, FOR SOLUTION INTRAVENOUS at 14:31

## 2019-06-12 RX ADMIN — Medication 650 MILLIGRAM(S): at 21:09

## 2019-06-12 RX ADMIN — Medication 1.5 MILLIGRAM(S): at 05:42

## 2019-06-12 RX ADMIN — PIPERACILLIN AND TAZOBACTAM 25 GRAM(S): 4; .5 INJECTION, POWDER, LYOPHILIZED, FOR SOLUTION INTRAVENOUS at 05:42

## 2019-06-12 RX ADMIN — PIPERACILLIN AND TAZOBACTAM 25 GRAM(S): 4; .5 INJECTION, POWDER, LYOPHILIZED, FOR SOLUTION INTRAVENOUS at 21:09

## 2019-06-12 RX ADMIN — LISINOPRIL 20 MILLIGRAM(S): 2.5 TABLET ORAL at 05:42

## 2019-06-12 RX ADMIN — Medication 650 MILLIGRAM(S): at 22:09

## 2019-06-12 RX ADMIN — Medication 25 MILLIGRAM(S): at 14:21

## 2019-06-12 RX ADMIN — HEPARIN SODIUM 5000 UNIT(S): 5000 INJECTION INTRAVENOUS; SUBCUTANEOUS at 21:08

## 2019-06-12 RX ADMIN — Medication 25 MILLIGRAM(S): at 05:42

## 2019-06-12 RX ADMIN — HEPARIN SODIUM 5000 UNIT(S): 5000 INJECTION INTRAVENOUS; SUBCUTANEOUS at 05:42

## 2019-06-12 RX ADMIN — Medication 1.5 MILLIGRAM(S): at 14:21

## 2019-06-12 RX ADMIN — Medication 25 MILLIGRAM(S): at 21:08

## 2019-06-12 NOTE — PROGRESS NOTE BEHAVIORAL HEALTH - SUMMARY
RECOMMENDATIONS  - increase Ativan 1.5mg PO bid to TID via NGT for agitation RECOMMENDATIONS  - increase Ativan 1.5mg PO bid to TID via NGT for agitation  - would repeat MRI of head to see if CVA territory expanded or if there is a new CVA

## 2019-06-12 NOTE — PROGRESS NOTE BEHAVIORAL HEALTH - NSBHFUPINTERVALHXFT_PSY_A_CORE
Patient has shown some clinical response to the Ativan dose increase but still needs PRNs for breakthrough agitation. As per staff, Patient still has episodes of nonsensical moaning-yelling. Staff also noted improvement overall in the frequency and intensity of agitation since Ativan was started, adjusted. Patient remains a poor historian, unable to verbalize needs.

## 2019-06-12 NOTE — PROGRESS NOTE ADULT - SUBJECTIVE AND OBJECTIVE BOX
Gastroenterology  Patient seen and examined bedside   Confused, Tolerating NGT feedings    T(F): 97.8 (19 @ 11:41), Max: 99.5 (19 @ 16:34)  HR: 73 (19 @ 11:41) (69 - 90)  BP: 161/69 (19 @ 11:41) (125/46 - 163/56)  RR: 19 (19 @ 11:41) (17 - 19)  SpO2: 95% (19 @ 11:41) (95% - 98%)  Wt(kg): --  CAPILLARY BLOOD GLUCOSE      POCT Blood Glucose.: 100 mg/dL (2019 11:04)  POCT Blood Glucose.: 126 mg/dL (2019 21:00)  POCT Blood Glucose.: 119 mg/dL (2019 16:27)      PHYSICAL EXAM:  General: NAD   Neuro:  Alert & responsive  HEENT: NCAT, EOMI, conjunctiva clear, NGT  CV: +S1+S2 regular rate and rhythm  Lung: clear to ausculation bilaterally, respirations nonlabored, good inspiratory effort  Abdomen: soft, Non tender, No Distension. Normal active BS, ostomy  Extremities: no pedal edema or calf tenderness noted     LABS:                        9.2    9.02  )-----------( 301      ( 2019 07:24 )             29.0     06-    144  |  109<H>  |  9   ----------------------------<  110<H>  4.7   |  29  |  0.80    Ca    9.4      2019 07:24          I&O's Detail    2019 07:01  -  2019 07:00  --------------------------------------------------------  IN:  Total IN: 0 mL    OUT:    Incontinent per Collection Ba mL  Total OUT: 1525 mL    Total NET: -1525 mL      2019 07:01  -  2019 12:52  --------------------------------------------------------  IN:  Total IN: 0 mL    OUT:    Incontinent per Collection Ba mL  Total OUT: 900 mL    Total NET: -900 mL

## 2019-06-12 NOTE — PROGRESS NOTE ADULT - SUBJECTIVE AND OBJECTIVE BOX
lethargic  VSS  NG feeding  Lungs fairly clear  S1S2 reg  Abd soft  Ext no c/e  Psych eval noted  GI noted  PEG in AM appropriate risk for the procedure  Cleared

## 2019-06-12 NOTE — PROGRESS NOTE ADULT - SUBJECTIVE AND OBJECTIVE BOX
Subjective Complaints:  Historian:   record      REVIEW OF SYSTEMS:  Eyes:  Good vision, no reported pain  ENT:  No sore throat, pain, runny nose, dysphagia  CV:  No pain, palpitatioins, hypo/hypertension  Resp:  No dyspnea, cough, tachypnea, wheezing  GI:  No pain, nausea, vomiting, diarrhea, constipatiion  Muscle:  No pain, weakness  Neuro:  No weakness, tingling, memory problems  Psych:  No fatigue, insomnia, mood problems, depression  Endocrine:  No polyuria, polydypsia, cold/heat intolerance    Vital Signs Last 24 Hrs  T(C): 36.6 (12 Jun 2019 16:54), Max: 37.2 (11 Jun 2019 23:26)  T(F): 97.8 (12 Jun 2019 16:54), Max: 98.9 (11 Jun 2019 23:26)  HR: 71 (12 Jun 2019 16:54) (69 - 90)  BP: 132/52 (12 Jun 2019 16:54) (132/52 - 163/56)  BP(mean): 92 (12 Jun 2019 05:29) (92 - 96)  RR: 18 (12 Jun 2019 16:54) (17 - 19)  SpO2: 96% (12 Jun 2019 16:54) (95% - 98%)    GENERAL PHYSICAL EXAM:  General:  Appears stated age, well-groomed, well-nourished, no distress  HEENT:  NC/AT, patent nares w/ pink mucosa, OP clear w/o lesions, PERRL, EOMI, conjunctivae clear, no thyromegaly, nodules, adenopathy, no JVD  Chest:  Full & symmetric excursion, no increased effort, breath sounds clear  Cardiovascular:  Regular rhythm, S1, S2, no murmur/rub/S3/S4, no carotid/femoral/abdominal bruit, radial/pedal pulses 2+, no edema  Abdomen:  Soft, non-tender, non-distended, normoactive bowel sounds, no HSM  Extremities:  Gait & station:   Digits:   Nails:   Joints, Bones, Muscles:   ROM:   Stability:  Skin:  No rash/erythema/ecchymoses/petechiae/wounds/abscess/warm/dry  Musculoskeletal:  Full ROM in all joints w/o swelling/tenderness/effusion    NEUROLOGICAL EXAM:  HENT:  Normocephalic head; atraumatic head.  Neck supple.  ENT: normal looking.  Mental State:    Awake, oriented to person,   Coherent.  Speech dysarthric,word finding difficulty.  Cooperative. at times agitated  Cranial Nerves:  II-XII:   Pupils round and reactive to light and accommodation.  Extraocular movements full.  Visual fields full (no homonymous hemianopsia).  Visual acuity wnl.  Facial symmetry intact.  Tongue midline.  Motor Functions:  Moves all extremities.  No pronator drift of UE. right 4/5   Sensory Functions:   Intact to touch and pinprick to face and extremities.    Reflexes:  Deep tendon reflexes normoactive to biceps, knees and ankles.  Babinski absent (present).  Cerebellar Testing:    Finger to nose intact.  Nystagmus absent.  GAIT :HESITANT     LABS:                        9.2    9.02  )-----------( 301      ( 11 Jun 2019 07:24 )             29.0     06-11    144  |  109<H>  |  9   ----------------------------<  110<H>  4.7   |  29  |  0.80    Ca    9.4      11 Jun 2019 07:24              RADIOLOGY & ADDITIONAL STUDIES:    POCT  Blood Glucose: 21:00 (06-11 @ 21:01)  POCT  Blood Glucose: 11:04 (06-12 @ 11:06)  LORazepam     Tablet: [Ordered as ATIVAN]  1.5 milliGRAM(s), Oral via Nasogastric tube, three times a day  Indication: agitation  Special Instructions: HOLD for sedation  Administration Instructions: This is a Look-alike/Sound-alike Medication  Provider's Contact #: [phone] (06-12 @ 12:48)  Provider to RN:       HOLD NGT FEEDINGS after midnight (06-12 @ 15:06)  POCT  Blood Glucose: 15:59 (06-12 @ 16:00)  NonViolent NonSelf-Destructive Level One Restraint:   BEHAVIOR/REASON:;  Medical Management;  Agitation  TYPE OF RESTRAINT:;  Bilateral Unsecured Mittens  ORDER DURATION:  24 Hours  BEHAVIOR DISCONTINUATION CRITERIA:   Patient will Cease Engaging In/Threatening: Pulling at Tubes/Lines and Drains (06-12 @ 17:05)  DX  STATUS POST LEFT HEMISPHERE CVA     Recommendations:    EEG.   DVT prophylaxis as ordered.  Medications: ANTIPLATELET, STATIN

## 2019-06-12 NOTE — PROGRESS NOTE BEHAVIORAL HEALTH - NSBHLOCFT_PSY_A_CORE
awake, semi-alert
dozing off
awake but unable to meaningfully engage in conversation / communication

## 2019-06-13 PROCEDURE — 99232 SBSQ HOSP IP/OBS MODERATE 35: CPT

## 2019-06-13 RX ORDER — DIVALPROEX SODIUM 500 MG/1
125 TABLET, DELAYED RELEASE ORAL THREE TIMES A DAY
Refills: 0 | Status: DISCONTINUED | OUTPATIENT
Start: 2019-06-13 | End: 2019-06-14

## 2019-06-13 RX ORDER — BACITRACIN ZINC 500 UNIT/G
1 OINTMENT IN PACKET (EA) TOPICAL DAILY
Refills: 0 | Status: DISCONTINUED | OUTPATIENT
Start: 2019-06-13 | End: 2019-06-18

## 2019-06-13 RX ADMIN — HEPARIN SODIUM 5000 UNIT(S): 5000 INJECTION INTRAVENOUS; SUBCUTANEOUS at 06:05

## 2019-06-13 RX ADMIN — Medication 1.5 MILLIGRAM(S): at 06:05

## 2019-06-13 RX ADMIN — LISINOPRIL 20 MILLIGRAM(S): 2.5 TABLET ORAL at 06:05

## 2019-06-13 RX ADMIN — Medication 2 MILLIGRAM(S): at 03:36

## 2019-06-13 RX ADMIN — Medication 25 MILLIGRAM(S): at 13:30

## 2019-06-13 RX ADMIN — HEPARIN SODIUM 5000 UNIT(S): 5000 INJECTION INTRAVENOUS; SUBCUTANEOUS at 13:30

## 2019-06-13 RX ADMIN — DIVALPROEX SODIUM 125 MILLIGRAM(S): 500 TABLET, DELAYED RELEASE ORAL at 21:16

## 2019-06-13 RX ADMIN — Medication 25 MILLIGRAM(S): at 06:04

## 2019-06-13 RX ADMIN — Medication 25 MILLIGRAM(S): at 21:17

## 2019-06-13 RX ADMIN — Medication 1.5 MILLIGRAM(S): at 13:29

## 2019-06-13 RX ADMIN — MIRTAZAPINE 7.5 MILLIGRAM(S): 45 TABLET, ORALLY DISINTEGRATING ORAL at 21:16

## 2019-06-13 RX ADMIN — PIPERACILLIN AND TAZOBACTAM 25 GRAM(S): 4; .5 INJECTION, POWDER, LYOPHILIZED, FOR SOLUTION INTRAVENOUS at 06:06

## 2019-06-13 RX ADMIN — PIPERACILLIN AND TAZOBACTAM 25 GRAM(S): 4; .5 INJECTION, POWDER, LYOPHILIZED, FOR SOLUTION INTRAVENOUS at 21:20

## 2019-06-13 RX ADMIN — Medication 25 MILLIGRAM(S): at 06:05

## 2019-06-13 RX ADMIN — HEPARIN SODIUM 5000 UNIT(S): 5000 INJECTION INTRAVENOUS; SUBCUTANEOUS at 21:17

## 2019-06-13 RX ADMIN — Medication 1 APPLICATION(S): at 22:45

## 2019-06-13 RX ADMIN — Medication 1.5 MILLIGRAM(S): at 21:16

## 2019-06-13 RX ADMIN — Medication 300 MILLIGRAM(S): at 11:54

## 2019-06-13 RX ADMIN — PIPERACILLIN AND TAZOBACTAM 25 GRAM(S): 4; .5 INJECTION, POWDER, LYOPHILIZED, FOR SOLUTION INTRAVENOUS at 15:38

## 2019-06-13 NOTE — PROGRESS NOTE BEHAVIORAL HEALTH - NSBHFUPINTERVALHXFT_PSY_A_CORE
Patient lying in bed awake, moaning and yelling in a monotonous tone with mouth agape. Does not engage in a meaningful manner or make significant eye contact. Unable to communicate needs. Patient seems to be tolerating increase in Ativan but her agitation / distress is not controlled. trying new agent Patient lying in bed awake, moaning and yelling in a monotonous tone with mouth agape. Does not engage in a meaningful manner or make significant eye contact. Unable to communicate needs. Patient seems to be tolerating increase in Ativan but her agitation / distress is not controlled. trying new agent    Discussion about risks/benefits held with Pt's daughter Ms. Ley who is saddened and feels lost as she does not know if her mother will ever speak/swallow again and feels that she was not explained the size, location of Pt's CVA and prognosis. Patient's daughter would also like a repeat head imaging done as she is distressed at her mother not getting better, continuing to moan and today seemingly communicating with her even less. Writer gave emotional support for daughter and encouraged her to communicate this with Pt's other providers as well. Ms Ley was grateful and agreeing to trying depakote for Patient.

## 2019-06-13 NOTE — PROGRESS NOTE ADULT - SUBJECTIVE AND OBJECTIVE BOX
EGD/PEG NOTE  Indication: DYSPHGAI     Anesthesia MAC provided by : DR PABLO     Assistant: STEPHIE MACKAY NP    Pre-Medications: ZOSYN IVPB    PROCEDURE: The patient was taken to the endoscopy suite. She was placed in 30 degrees head-up position. A time-out was held, and the patient and the planned procedure were confirmed with all those present. The patient was placed on pulse oximetry and a monitor as well as nasal cannula oxygen. Once an adequate level of sedation was reached, a bite block was placed, and the esophagus was intubated. We then passed the GE junction and entered the stomach. We continued the endoscopy out to the first portion of the duodenum. There were no ulcers present.     The light from the endoscope was readily visible through the patient's anterior abdominal wall, 2 cm inferior to the left costal margin. This transilluminated quite easily. A single finger impulse was seen briskly through the gastroscope as well. The patient's epigastrium was then prepped with Betadine and draped in a standard sterile fashion. We used a local needle to infiltrate the skin over the proposed PEG site first. This was infiltrated with a wheal of lidocaine. The local needle was then used to enter the stomach. Suction was held on this as it was used to enter the stomach, and we saw no air or succus or stool prior to entering the gastric lumen. Local was then infiltrated as we withdrew the needle along the path of this.    A small stab incision was then made, and the introducer needle was then placed while aspirating into the gastric lumen. Again, no air, succus or stool was noted prior to visualizing the tip of the needle in the gastric lumen. The guidewire was then placed through the introducer needle. This was grasped and was withdrawn through the patient's mouth. This was then attached to a #20 PEG tube, which was then pulled in an antegrade manner into the stomach and out the anterior abdominal wall. The esophagus was reentered with the gastroscope, and this was advanced into the stomach. The tube at 2.5 - 3. 0 cm was noted. It appeared it spin easily. It was secured at this depth, then cut to size, and placed to gravity drainage.    The stomach was then desufflated, and the endoscope was then withdrawn along the length of the esophagus. The patient tolerated the procedure well. There were no immediate complications noted.

## 2019-06-13 NOTE — PROGRESS NOTE BEHAVIORAL HEALTH - SUMMARY
RECOMMENDATIONS  - start depakote sprinkles via NGT 125mg TID for continued distressing agitation. Ativan is not holding Patient enough   - would repeat MRI of head to see if CVA territory expanded or if there is a new CVA RECOMMENDATIONS  - start depakote sprinkles via NGT 125mg TID for continued distressing agitation. Ativan is not holding Patient enough. explained to daughter that this is for symptom management to reduce pt's distress and yelling.   - highly recommending a repeat MRI of head to see if CVA territory expanded or if there is a new CVA  - Patient's decision maker is her daughter who thus should receive all relevant clinical information w/ explanations thereby allowing her to make the best / informed decisions for her mother.

## 2019-06-13 NOTE — PROGRESS NOTE ADULT - SUBJECTIVE AND OBJECTIVE BOX
Gastroenterology  Patient seen and examined bedside resting comfortably.  NGT feedings on Hold  Normal flatus/BM.     T(F): 99 (19 @ 12:04), Max: 99 (19 @ 12:04)  HR: 72 (19 @ 12:04) (71 - 90)  BP: 134/58 (19 @ 12:04) (132/52 - 171/60)  RR: 18 (19 @ 12:04) (18 - 18)  SpO2: 97% (19 @ 12:04) (94% - 97%)  Wt(kg): --  CAPILLARY BLOOD GLUCOSE      POCT Blood Glucose.: 123 mg/dL (2019 15:59)      PHYSICAL EXAM:  General: NAD, WDWN.   Neuro:  Alert & responsive  HEENT: NCAT, EOMI, conjunctiva clear  CV: +S1+S2 regular rate and rhythm  Lung: clear to ausculation bilaterally, respirations nonlabored, good inspiratory effort  Abdomen: soft, Non tender, No Distension. Normal active BS  Extremities: no pedal edema or calf tenderness noted     LABS:    I&O's Detail    2019 07:01  -  2019 07:00  --------------------------------------------------------  IN:  Total IN: 0 mL    OUT:    Incontinent per Collection Ba mL  Total OUT: 1800 mL    Total NET: -1800 mL

## 2019-06-13 NOTE — PROGRESS NOTE ADULT - SUBJECTIVE AND OBJECTIVE BOX
confused moaning  VSS  Erythema L wrist noted  Bacitracin and d/d daily  Awaiting PEG  Discussed at length c daughter

## 2019-06-13 NOTE — PROGRESS NOTE BEHAVIORAL HEALTH - OTHER
continuous moaning/yelling unable to ascertain at this time n/a - sleeping looks older than stated age; + NGT in unable to engage and cooperate deferred at this time

## 2019-06-14 ENCOUNTER — TRANSCRIPTION ENCOUNTER (OUTPATIENT)
Age: 74
End: 2019-06-14

## 2019-06-14 LAB
GLUCOSE BLDC GLUCOMTR-MCNC: 144 MG/DL — HIGH (ref 70–99)
GLUCOSE BLDC GLUCOMTR-MCNC: 164 MG/DL — HIGH (ref 70–99)

## 2019-06-14 PROCEDURE — 70551 MRI BRAIN STEM W/O DYE: CPT | Mod: 26

## 2019-06-14 PROCEDURE — 99232 SBSQ HOSP IP/OBS MODERATE 35: CPT

## 2019-06-14 RX ORDER — HYDRALAZINE HCL 50 MG
1 TABLET ORAL
Qty: 0 | Refills: 0 | DISCHARGE
Start: 2019-06-14

## 2019-06-14 RX ORDER — ATORVASTATIN CALCIUM 80 MG/1
1 TABLET, FILM COATED ORAL
Qty: 0 | Refills: 0 | DISCHARGE

## 2019-06-14 RX ORDER — DIVALPROEX SODIUM 500 MG/1
125 TABLET, DELAYED RELEASE ORAL THREE TIMES A DAY
Refills: 0 | Status: DISCONTINUED | OUTPATIENT
Start: 2019-06-14 | End: 2019-06-14

## 2019-06-14 RX ORDER — METOPROLOL TARTRATE 50 MG
1 TABLET ORAL
Qty: 0 | Refills: 0 | DISCHARGE
Start: 2019-06-14

## 2019-06-14 RX ORDER — ACETAMINOPHEN 500 MG
1 TABLET ORAL
Qty: 0 | Refills: 0 | DISCHARGE
Start: 2019-06-14

## 2019-06-14 RX ORDER — ASPIRIN/CALCIUM CARB/MAGNESIUM 324 MG
1 TABLET ORAL
Qty: 0 | Refills: 0 | DISCHARGE
Start: 2019-06-14

## 2019-06-14 RX ORDER — BACITRACIN ZINC 500 UNIT/G
1 OINTMENT IN PACKET (EA) TOPICAL
Qty: 0 | Refills: 0 | DISCHARGE
Start: 2019-06-14

## 2019-06-14 RX ORDER — HEPARIN SODIUM 5000 [USP'U]/ML
5000 INJECTION INTRAVENOUS; SUBCUTANEOUS
Qty: 0 | Refills: 0 | DISCHARGE
Start: 2019-06-14

## 2019-06-14 RX ORDER — VALPROIC ACID (AS SODIUM SALT) 250 MG/5ML
125 SOLUTION, ORAL ORAL THREE TIMES A DAY
Refills: 0 | Status: DISCONTINUED | OUTPATIENT
Start: 2019-06-14 | End: 2019-06-15

## 2019-06-14 RX ORDER — MIRTAZAPINE 45 MG/1
1 TABLET, ORALLY DISINTEGRATING ORAL
Qty: 0 | Refills: 0 | DISCHARGE
Start: 2019-06-14

## 2019-06-14 RX ORDER — VALPROIC ACID (AS SODIUM SALT) 250 MG/5ML
2.5 SOLUTION, ORAL ORAL
Qty: 0 | Refills: 0 | DISCHARGE
Start: 2019-06-14

## 2019-06-14 RX ADMIN — Medication 125 MILLIGRAM(S): at 21:28

## 2019-06-14 RX ADMIN — Medication 1.5 MILLIGRAM(S): at 14:10

## 2019-06-14 RX ADMIN — MIRTAZAPINE 7.5 MILLIGRAM(S): 45 TABLET, ORALLY DISINTEGRATING ORAL at 21:29

## 2019-06-14 RX ADMIN — Medication 1 APPLICATION(S): at 11:22

## 2019-06-14 RX ADMIN — Medication 1.5 MILLIGRAM(S): at 06:21

## 2019-06-14 RX ADMIN — Medication 1 MILLIGRAM(S): at 03:29

## 2019-06-14 RX ADMIN — Medication 300 MILLIGRAM(S): at 14:11

## 2019-06-14 RX ADMIN — PIPERACILLIN AND TAZOBACTAM 25 GRAM(S): 4; .5 INJECTION, POWDER, LYOPHILIZED, FOR SOLUTION INTRAVENOUS at 21:37

## 2019-06-14 RX ADMIN — Medication 25 MILLIGRAM(S): at 14:15

## 2019-06-14 RX ADMIN — Medication 1.5 MILLIGRAM(S): at 21:29

## 2019-06-14 RX ADMIN — DIVALPROEX SODIUM 125 MILLIGRAM(S): 500 TABLET, DELAYED RELEASE ORAL at 06:21

## 2019-06-14 RX ADMIN — PIPERACILLIN AND TAZOBACTAM 25 GRAM(S): 4; .5 INJECTION, POWDER, LYOPHILIZED, FOR SOLUTION INTRAVENOUS at 14:16

## 2019-06-14 RX ADMIN — HEPARIN SODIUM 5000 UNIT(S): 5000 INJECTION INTRAVENOUS; SUBCUTANEOUS at 14:14

## 2019-06-14 RX ADMIN — Medication 25 MILLIGRAM(S): at 17:10

## 2019-06-14 RX ADMIN — HEPARIN SODIUM 5000 UNIT(S): 5000 INJECTION INTRAVENOUS; SUBCUTANEOUS at 21:29

## 2019-06-14 RX ADMIN — HEPARIN SODIUM 5000 UNIT(S): 5000 INJECTION INTRAVENOUS; SUBCUTANEOUS at 06:21

## 2019-06-14 RX ADMIN — LISINOPRIL 20 MILLIGRAM(S): 2.5 TABLET ORAL at 11:22

## 2019-06-14 RX ADMIN — PIPERACILLIN AND TAZOBACTAM 25 GRAM(S): 4; .5 INJECTION, POWDER, LYOPHILIZED, FOR SOLUTION INTRAVENOUS at 06:21

## 2019-06-14 NOTE — PROGRESS NOTE BEHAVIORAL HEALTH - NSBHFUPINTERVALHXFT_PSY_A_CORE
Interval events: s/p EGD / PEG yesterday. Repeat MRI performed this morning showing Fairly large subacute left MCA territory infarct again noted, with involvement of posterior/inferior division.. Interval events: s/p EGD / PEG yesterday. Repeat MRI performed this morning showing Fairly large subacute left MCA territory infarct again noted, with involvement of posterior/inferior division. Patient quiet in bed, dozing off, + decreased psychomotor agitation since depakote started. As per staff, they also noted clinical improvement in patient in that she is yelling/moaning less.

## 2019-06-14 NOTE — DISCHARGE NOTE PROVIDER - CARE PROVIDER_API CALL
Pati Castro)  Neurology  [address], [address]  [place] [address]  Phone: [phone]  Fax: [phone]  Follow Up Time:     Edil Nichols (DO)  Family Medicine  [address], [address]  [place] [address]  Phone: [phone]  Fax: [phone]  Follow Up Time:

## 2019-06-14 NOTE — PROGRESS NOTE ADULT - PROBLEM SELECTOR PROBLEM 1
Dysphagia as late effect of cerebrovascular accident (CVA)

## 2019-06-14 NOTE — PROGRESS NOTE ADULT - ASSESSMENT
75y/o BF with Dyspagia
73y/o BF with Dyspagia
73y/o BF with Dyspagia  s/p EGD / PEG
75y/o BF with Dyspagia  s/p EGD / PEG

## 2019-06-14 NOTE — DISCHARGE NOTE PROVIDER - HOSPITAL COURSE
pt c hx coolon CA colostomy no significant CV hx admitted c Acute L MCA CVA. Eval by neurology, speech RX - dysphagia, NPO.    PT is able to move extremities but incoherent , confused.    PEG placed    As per neurology recommendations MRI repeated - no changes    d/c to rehab Acute rehab     Prognosis guarded

## 2019-06-14 NOTE — PROGRESS NOTE BEHAVIORAL HEALTH - SUMMARY
RECOMMENDATIONS  - changed form to Depakene syrup given new route, continue via PEG tube 125mg TID for continued distressing agitation (changed from NGT route). Ativan is not holding Patient enough. explained to daughter that this is for symptom management to reduce pt's distress and yelling.

## 2019-06-14 NOTE — DISCHARGE NOTE PROVIDER - NSDCCPCAREPLAN_GEN_ALL_CORE_FT
PRINCIPAL DISCHARGE DIAGNOSIS  Diagnosis: CVA (cerebral vascular accident)  Assessment and Plan of Treatment:       SECONDARY DISCHARGE DIAGNOSES  Diagnosis: Elevated troponin I level  Assessment and Plan of Treatment:

## 2019-06-14 NOTE — PROGRESS NOTE BEHAVIORAL HEALTH - OTHER
looks older than stated age; + NGT in unable to engage and cooperate deferred at this time continuous moaning/yelling unable to ascertain at this time n/a - sleeping

## 2019-06-14 NOTE — PROGRESS NOTE ADULT - PROBLEM SELECTOR PLAN 1
Scheduled for PEG tube placement tomorrow   Hold Feedings after midnight
Scheduled for PEG tube placement today
s/p PEG, advance to goal rate, Elevate HOB during feedings, check residual PRN  local peg site care prn. Please reconsult as needed
see post orders

## 2019-06-14 NOTE — PROGRESS NOTE ADULT - SUBJECTIVE AND OBJECTIVE BOX
Gastroenterology  Patient seen and examined bedside resting comfortably.  No complaints offered.   No abdominal pain  Denies nausea and vomiting. Tolerating  PEG  local peg site care prn. Please reconsult as needed .  Normal flatus/BM.     T(F): 99.1 (19 @ 16:31), Max: 99.1 (19 @ 16:31)  HR: 81 (19 @ 16:31) (81 - 108)  BP: 119/51 (19 @ 16:31) (109/44 - 178/58)  RR: 18 (19 @ 16:31) (14 - 23)  SpO2: 96% (19 @ 16:31) (94% - 100%)  Wt(kg): --  CAPILLARY BLOOD GLUCOSE      POCT Blood Glucose.: 164 mg/dL (2019 11:20)      PHYSICAL EXAM:  General: NAD, WDWN.   Neuro:  Alert & responsive  HEENT: NCAT, EOMI, conjunctiva clear  CV: +S1+S2 regular rate and rhythm  Lung: clear to ausculation bilaterally, respirations nonlabored, good inspiratory effort  Abdomen: soft, Non Tender,+ s/p PEG, advance to goal rate, Elevate HOB during feedings, check residual PRN  local peg site care prn. Please reconsult as needed site clear No distention Normal active BS  Extremities: no cyanosis, clubbing or edema    LABS:              I&O's Detail    2019 07:01  -  2019 07:00  --------------------------------------------------------  IN:  Total IN: 0 mL    OUT:    Incontinent per Collection Ba mL  Total OUT: 355 mL    Total NET: -355 mL

## 2019-06-14 NOTE — PROGRESS NOTE ADULT - SUBJECTIVE AND OBJECTIVE BOX
Pt is confused  MRI repeated no changes  discussed c neurologist  Discussed c Dr Rosa  PEG in place  frothy copious d/c from mouth  Try Scopolamin patch  PT evaluation  Consider Rehab after PT eval

## 2019-06-15 LAB
GLUCOSE BLDC GLUCOMTR-MCNC: 144 MG/DL — HIGH (ref 70–99)
GLUCOSE BLDC GLUCOMTR-MCNC: 258 MG/DL — HIGH (ref 70–99)

## 2019-06-15 PROCEDURE — 99232 SBSQ HOSP IP/OBS MODERATE 35: CPT

## 2019-06-15 RX ORDER — VALPROIC ACID (AS SODIUM SALT) 250 MG/5ML
250 SOLUTION, ORAL ORAL
Refills: 0 | Status: DISCONTINUED | OUTPATIENT
Start: 2019-06-15 | End: 2019-06-17

## 2019-06-15 RX ADMIN — Medication 1 MILLIGRAM(S): at 02:37

## 2019-06-15 RX ADMIN — Medication 1 APPLICATION(S): at 11:07

## 2019-06-15 RX ADMIN — MIRTAZAPINE 7.5 MILLIGRAM(S): 45 TABLET, ORALLY DISINTEGRATING ORAL at 21:24

## 2019-06-15 RX ADMIN — HEPARIN SODIUM 5000 UNIT(S): 5000 INJECTION INTRAVENOUS; SUBCUTANEOUS at 21:23

## 2019-06-15 RX ADMIN — Medication 25 MILLIGRAM(S): at 17:10

## 2019-06-15 RX ADMIN — Medication 25 MILLIGRAM(S): at 14:48

## 2019-06-15 RX ADMIN — Medication 1.5 MILLIGRAM(S): at 05:04

## 2019-06-15 RX ADMIN — Medication 1.5 MILLIGRAM(S): at 21:23

## 2019-06-15 RX ADMIN — Medication 25 MILLIGRAM(S): at 05:04

## 2019-06-15 RX ADMIN — Medication 250 MILLIGRAM(S): at 17:09

## 2019-06-15 RX ADMIN — Medication 125 MILLIGRAM(S): at 05:04

## 2019-06-15 RX ADMIN — PIPERACILLIN AND TAZOBACTAM 25 GRAM(S): 4; .5 INJECTION, POWDER, LYOPHILIZED, FOR SOLUTION INTRAVENOUS at 05:03

## 2019-06-15 RX ADMIN — HEPARIN SODIUM 5000 UNIT(S): 5000 INJECTION INTRAVENOUS; SUBCUTANEOUS at 05:06

## 2019-06-15 RX ADMIN — Medication 1.5 MILLIGRAM(S): at 14:48

## 2019-06-15 RX ADMIN — Medication 300 MILLIGRAM(S): at 11:08

## 2019-06-15 RX ADMIN — HEPARIN SODIUM 5000 UNIT(S): 5000 INJECTION INTRAVENOUS; SUBCUTANEOUS at 14:49

## 2019-06-15 NOTE — PROGRESS NOTE ADULT - SUBJECTIVE AND OBJECTIVE BOX
lethargic moaning loudly  no distress  Afeb VSS  Lungs fairly clear  S1S2 reg  Abd soft PEG feeding  Extr no c/e  s/p Acute CVA  psychosis  Dysphagia  d/c to Acute rehab after neurology and psychiatry reevaluation

## 2019-06-15 NOTE — PROGRESS NOTE BEHAVIORAL HEALTH - NSBHFUPINTERVALHXFT_PSY_A_CORE
Patient more calm but still needs PRNs for breakthrough agitation. Cognitively, she remains the same wit no clinical change.

## 2019-06-16 LAB
GLUCOSE BLDC GLUCOMTR-MCNC: 124 MG/DL — HIGH (ref 70–99)
GLUCOSE BLDC GLUCOMTR-MCNC: 131 MG/DL — HIGH (ref 70–99)
GLUCOSE BLDC GLUCOMTR-MCNC: 132 MG/DL — HIGH (ref 70–99)
GLUCOSE BLDC GLUCOMTR-MCNC: 133 MG/DL — HIGH (ref 70–99)
GLUCOSE BLDC GLUCOMTR-MCNC: 155 MG/DL — HIGH (ref 70–99)

## 2019-06-16 RX ADMIN — Medication 300 MILLIGRAM(S): at 11:37

## 2019-06-16 RX ADMIN — Medication 250 MILLIGRAM(S): at 06:00

## 2019-06-16 RX ADMIN — Medication 1.5 MILLIGRAM(S): at 05:59

## 2019-06-16 RX ADMIN — Medication 1 APPLICATION(S): at 11:38

## 2019-06-16 RX ADMIN — Medication 25 MILLIGRAM(S): at 06:00

## 2019-06-16 RX ADMIN — HEPARIN SODIUM 5000 UNIT(S): 5000 INJECTION INTRAVENOUS; SUBCUTANEOUS at 13:46

## 2019-06-16 RX ADMIN — Medication 1.5 MILLIGRAM(S): at 13:46

## 2019-06-16 RX ADMIN — MIRTAZAPINE 7.5 MILLIGRAM(S): 45 TABLET, ORALLY DISINTEGRATING ORAL at 21:12

## 2019-06-16 RX ADMIN — HEPARIN SODIUM 5000 UNIT(S): 5000 INJECTION INTRAVENOUS; SUBCUTANEOUS at 21:12

## 2019-06-16 RX ADMIN — Medication 250 MILLIGRAM(S): at 17:43

## 2019-06-16 RX ADMIN — Medication 25 MILLIGRAM(S): at 17:43

## 2019-06-16 RX ADMIN — Medication 0.5 MILLIGRAM(S): at 11:37

## 2019-06-16 RX ADMIN — HEPARIN SODIUM 5000 UNIT(S): 5000 INJECTION INTRAVENOUS; SUBCUTANEOUS at 06:00

## 2019-06-16 RX ADMIN — Medication 1.5 MILLIGRAM(S): at 21:11

## 2019-06-17 LAB
GLUCOSE BLDC GLUCOMTR-MCNC: 105 MG/DL — HIGH (ref 70–99)
GLUCOSE BLDC GLUCOMTR-MCNC: 118 MG/DL — HIGH (ref 70–99)
GLUCOSE BLDC GLUCOMTR-MCNC: 124 MG/DL — HIGH (ref 70–99)
GLUCOSE BLDC GLUCOMTR-MCNC: 131 MG/DL — HIGH (ref 70–99)
GLUCOSE BLDC GLUCOMTR-MCNC: 91 MG/DL — SIGNIFICANT CHANGE UP (ref 70–99)

## 2019-06-17 PROCEDURE — 74018 RADEX ABDOMEN 1 VIEW: CPT | Mod: 26

## 2019-06-17 PROCEDURE — 99232 SBSQ HOSP IP/OBS MODERATE 35: CPT

## 2019-06-17 RX ORDER — SIMETHICONE 80 MG/1
80 TABLET, CHEWABLE ORAL EVERY 8 HOURS
Refills: 0 | Status: DISCONTINUED | OUTPATIENT
Start: 2019-06-17 | End: 2019-06-17

## 2019-06-17 RX ORDER — ACETAMINOPHEN 500 MG
650 TABLET ORAL EVERY 6 HOURS
Refills: 0 | Status: DISCONTINUED | OUTPATIENT
Start: 2019-06-17 | End: 2019-06-18

## 2019-06-17 RX ORDER — MORPHINE SULFATE 50 MG/1
2 CAPSULE, EXTENDED RELEASE ORAL ONCE
Refills: 0 | Status: DISCONTINUED | OUTPATIENT
Start: 2019-06-17 | End: 2019-06-17

## 2019-06-17 RX ORDER — SIMETHICONE 80 MG/1
80 TABLET, CHEWABLE ORAL EVERY 8 HOURS
Refills: 0 | Status: DISCONTINUED | OUTPATIENT
Start: 2019-06-17 | End: 2019-06-18

## 2019-06-17 RX ORDER — POLYETHYLENE GLYCOL 3350 17 G/17G
17 POWDER, FOR SOLUTION ORAL DAILY
Refills: 0 | Status: DISCONTINUED | OUTPATIENT
Start: 2019-06-17 | End: 2019-06-18

## 2019-06-17 RX ORDER — VALPROIC ACID (AS SODIUM SALT) 250 MG/5ML
250 SOLUTION, ORAL ORAL THREE TIMES A DAY
Refills: 0 | Status: DISCONTINUED | OUTPATIENT
Start: 2019-06-17 | End: 2019-06-18

## 2019-06-17 RX ADMIN — Medication 25 MILLIGRAM(S): at 05:30

## 2019-06-17 RX ADMIN — Medication 25 MILLIGRAM(S): at 22:27

## 2019-06-17 RX ADMIN — Medication 250 MILLIGRAM(S): at 22:28

## 2019-06-17 RX ADMIN — Medication 250 MILLIGRAM(S): at 13:19

## 2019-06-17 RX ADMIN — Medication 250 MILLIGRAM(S): at 05:30

## 2019-06-17 RX ADMIN — MORPHINE SULFATE 2 MILLIGRAM(S): 50 CAPSULE, EXTENDED RELEASE ORAL at 17:30

## 2019-06-17 RX ADMIN — Medication 1 MILLIGRAM(S): at 13:18

## 2019-06-17 RX ADMIN — Medication 1.5 MILLIGRAM(S): at 19:53

## 2019-06-17 RX ADMIN — POLYETHYLENE GLYCOL 3350 17 GRAM(S): 17 POWDER, FOR SOLUTION ORAL at 17:28

## 2019-06-17 RX ADMIN — HEPARIN SODIUM 5000 UNIT(S): 5000 INJECTION INTRAVENOUS; SUBCUTANEOUS at 22:27

## 2019-06-17 RX ADMIN — HEPARIN SODIUM 5000 UNIT(S): 5000 INJECTION INTRAVENOUS; SUBCUTANEOUS at 05:30

## 2019-06-17 RX ADMIN — HEPARIN SODIUM 5000 UNIT(S): 5000 INJECTION INTRAVENOUS; SUBCUTANEOUS at 13:19

## 2019-06-17 RX ADMIN — Medication 650 MILLIGRAM(S): at 18:32

## 2019-06-17 RX ADMIN — Medication 300 MILLIGRAM(S): at 12:35

## 2019-06-17 RX ADMIN — Medication 1 MILLIGRAM(S): at 17:28

## 2019-06-17 RX ADMIN — MIRTAZAPINE 7.5 MILLIGRAM(S): 45 TABLET, ORALLY DISINTEGRATING ORAL at 22:29

## 2019-06-17 RX ADMIN — Medication 1 MILLIGRAM(S): at 22:56

## 2019-06-17 RX ADMIN — SIMETHICONE 80 MILLIGRAM(S): 80 TABLET, CHEWABLE ORAL at 23:59

## 2019-06-17 RX ADMIN — Medication 1.5 MILLIGRAM(S): at 05:30

## 2019-06-17 RX ADMIN — Medication 650 MILLIGRAM(S): at 17:28

## 2019-06-17 RX ADMIN — Medication 1 APPLICATION(S): at 12:35

## 2019-06-17 RX ADMIN — Medication 650 MILLIGRAM(S): at 23:12

## 2019-06-17 RX ADMIN — MORPHINE SULFATE 2 MILLIGRAM(S): 50 CAPSULE, EXTENDED RELEASE ORAL at 16:49

## 2019-06-17 NOTE — CHART NOTE - NSCHARTNOTEFT_GEN_A_CORE
Assessment:   Pt seen for follow up for nutrition diagnosis for inadequate oral or Nutrition Support Intake.  Pt adm c CVA, elevated troponin on 05/31/19, c poor oral intake & was placed on NGT feeding 06/06/19 & s/p PEG 06/13/19.  Pt c hx of shingles, polyneuropathy, rectal cancer, s/p colostomy, awaiting for Rehab placement.    Factors impacting intake: [ ] none [ ] nausea  [ ] vomiting [ ] diarrhea [ ] constipation  [ ]chewing problems [x ] swallowing issues; 06/10, swallow evaluation recommended NPO  [ x] other: on G-Tube feeding    Diet Prescription: Diet, Dysphagia 1 Pureed-Nectar Consistency Fluid:   Tube Feeding Modality: Gastrostomy  Jevity 1.2 Martin  Total Volume for 24 Hours (mL): 1320  Continuous  Starting Tube Feed Rate {mL per Hour}: 30  Increase Tube Feed Rate by (mL): 10     Every 4 hours  Until Goal Tube Feed Rate (mL per Hour): 55  Tube Feed Duration (in Hours): 24  Tube Feed Start Time: 21:00 (06-13-19 @ 17:17)    Intake: 0% oral intake, tolerating PEG feeding of c Jevity 1.2 @ goal rate 55 mL/hr x 24 hrs (=1320 ml,1584 martin, 73 gm pro, 1069 mL free water, 110% RDIs),     Current Weight: 06/17/19., 55.6 kg, 05/31,19, 51.7 kg , c wt. gain of 3.9 kg  % Weight Change: 7.54%  no edema noted, ? wt. accuracy, DVT preventive device on bed noted    Nutrition focused physical exam conducted; pt contracted, debilitated, Subcutaneous fat Exam;  [ Mild  ]  Orbital fat pads region,  [ Unable   ]Buccal fat region,  [ Mild  ]triceps region, [ WNL  ]ribs region.  Muscle Exam; [ Mild  ]temples region, [ Moderate  ]clavicle region, [ Mild   ]shoulder region, [ Unable  ]Scapula region, [ Mild  ]Interosseous region, [ Moderate ]thigh region, [  Mild ]Calf region    Pertinent Medications: MEDICATIONS  (STANDING):  aspirin Suppository 300 milliGRAM(s) Rectal daily  BACItracin   Ointment 1 Application(s) Topical daily  dextrose 5%. 1000 milliLiter(s) (50 mL/Hr) IV Continuous <Continuous>  dextrose 50% Injectable 12.5 Gram(s) IV Push once  dextrose 50% Injectable 25 Gram(s) IV Push once  dextrose 50% Injectable 25 Gram(s) IV Push once  heparin  Injectable 5000 Unit(s) SubCutaneous every 8 hours  hydrALAZINE 25 milliGRAM(s) Oral three times a day  LORazepam     Tablet 1.5 milliGRAM(s) Oral three times a day  LORazepam   Injectable 1.5 milliGRAM(s) IV Push once  metoprolol tartrate 25 milliGRAM(s) Oral two times a day  mirtazapine 7.5 milliGRAM(s) Oral at bedtime  valproic  acid Syrup 250 milliGRAM(s) Oral two times a day    MEDICATIONS  (PRN):  acetaminophen  Suppository .. 650 milliGRAM(s) Rectal every 6 hours PRN Temp greater or equal to 38C (100.4F), Mild Pain (1 - 3)  dextrose 40% Gel 15 Gram(s) Oral once PRN Blood Glucose LESS THAN 70 milliGRAM(s)/deciLiter  glucagon  Injectable 1 milliGRAM(s) IntraMuscular once PRN Glucose <70 milliGRAM(s)/deciLiter  LORazepam     Tablet 0.5 milliGRAM(s) Oral every 6 hours PRN mild anxiety  LORazepam   Injectable 1 milliGRAM(s) IV Push every 6 hours PRN severe agitation    Pertinent Labs:  05-31 [id number] 5.4 % 05-31 Chol 202 mg/dL<H>  mg/dL HDL 94 mg/dL Trig 42 mg/dL     CAPILLARY BLOOD GLUCOSE      POCT Blood Glucose.: 131 mg/dL (17 Jun 2019 06:45)  POCT Blood Glucose.: 118 mg/dL (17 Jun 2019 00:44)  POCT Blood Glucose.: 124 mg/dL (16 Jun 2019 17:45)    Skin: no pressure ulcer noted    Estimated Needs:   [x ] no change since previous assessment (06/03/19)  [ ] recalculated:     Previous Nutrition Diagnosis:   · Nutrition Diagnostic Terminology #1: Oral or Nutrition Support Intake  · Oral or Nutrition Support Intake: Inadequate oral intake  · Etiology: altered GI status- s/p CVA  dysphagia & altered mental status in the context of confusion and agitation  · Signs/Symptoms: inability to self-feed, refusing feedings    Nutrition Diagnosis is [ ] ongoing  [ ] resolved [x ] not applicable         New Nutrition Diagnosis:   [ ] Inadequate Energy Intake [ ]Inadequate Oral Intake [ ] Excessive Energy Intake   [ ] Underweight [ ] Increased Nutrient Needs [ ] Overweight/Obesity   [ ] Altered GI Function [ ] Unintended Weight Loss [ ] Food & Nutrition Related Knowledge Deficit [x ] Malnutrition; moderate malnutrition in context of acute illness     Related to: inadequate protein-energy intake in setting of CVA, dysphagia     As evidenced by: <75% nutrition needs >7 days during adm, physical finding of Mild fat & muscle loss      Goal: pt to continue to tolerate G-Tube feeding @ goal rate of Jevity 1.2 @ 55 ml/hr      Interventions:   Recommend  [ ] Change Diet To:  [ ] Nutrition Supplement  [x ] Nutrition Support: Recommend NPO c tube feeding, Jevity 1.2 goal rate 55 mL/hr x 24 hrs (=1320 ml,1584 martin, 73 gm pro, 1069 mL free water, 110% RDIs)   [x] Other: 150 ml water flush q 8 hours via G-Tube    Monitoring and Evaluation:   [ ] PO intake [ x ] Tolerance to diet prescription [ x ] weights [ x ] labs[ x ] follow up per protocol  [ ] other:

## 2019-06-17 NOTE — PROGRESS NOTE BEHAVIORAL HEALTH - NSBHFUPINTERVALHXFT_PSY_A_CORE
Patient not moaning / yelling; dozing off in bed, semi-awake, moving lightly around with occasional soft moan or mumbling. Overall, appears to be less distress and less uncomfortable as compared to initial presentation

## 2019-06-17 NOTE — CHART NOTE - NSCHARTNOTEFT_GEN_A_CORE
Upon Nutritional Assessment by the Registered Dietitian your patient was determined to meet criteria / has evidence of the following diagnosis/diagnoses:          [ ]  Mild Protein Calorie Malnutrition        [x ]  Moderate Protein Calorie Malnutrition(acute)        [ ] Severe Protein Calorie Malnutrition        [ ] Unspecified Protein Calorie Malnutrition        [ ] Underweight / BMI <19        [ ] Morbid Obesity / BMI > 40      Findings as based on:  •  Comprehensive nutrition assessment and consultation  •  Calorie counts (nutrient intake analysis)  •  Food acceptance and intake status from observations by staff  •  Follow up  •  Patient education  •  Intervention secondary to interdisciplinary rounds  •   concerns      Treatment:    The following diet has been recommended:  NPO c tube feeding, Jevity 1.2 @ goal rate 55 ml/hr & 150 ml water flush via G-Tube q 8 hours       PROVIDER Section:     By signing this assessment you are acknowledging and agree with the diagnosis/diagnoses assigned by the Registered Dietitian    Comments:

## 2019-06-17 NOTE — PROGRESS NOTE BEHAVIORAL HEALTH - SUMMARY
RECOMMENDATIONS  - trying to cross-taper Ativan / benzos to Depakote monotherapy in order to simplify Pt's regimen. So increase Depakene 250mg BID to TID and reduce Ativan 1.5mg PO tid to bid.

## 2019-06-17 NOTE — PROGRESS NOTE BEHAVIORAL HEALTH - OTHER
looks older than stated age; + NGT in unable to engage and cooperate deferred at this time n/a - nonverbal at this time soft mumbling - no moaning/yelling noted at this time looks more calm and less distressed as compared to initial presentation unable to ascertain at this time

## 2019-06-17 NOTE — PROGRESS NOTE BEHAVIORAL HEALTH - NSBHCONSULTFOLLOWAFTERCARE_PSY_A_CORE FT
likely will need BECKY cleared for discharge; BECKY/[facility] Psych or Neuro consultant can adjust Patient's depakote and Ativan as needed

## 2019-06-18 ENCOUNTER — TRANSCRIPTION ENCOUNTER (OUTPATIENT)
Age: 74
End: 2019-06-18

## 2019-06-18 VITALS
HEART RATE: 76 BPM | OXYGEN SATURATION: 94 % | SYSTOLIC BLOOD PRESSURE: 139 MMHG | DIASTOLIC BLOOD PRESSURE: 62 MMHG | RESPIRATION RATE: 18 BRPM | TEMPERATURE: 98 F

## 2019-06-18 LAB
GLUCOSE BLDC GLUCOMTR-MCNC: 102 MG/DL — HIGH (ref 70–99)
GLUCOSE BLDC GLUCOMTR-MCNC: 93 MG/DL — SIGNIFICANT CHANGE UP (ref 70–99)
GLUCOSE BLDC GLUCOMTR-MCNC: 99 MG/DL — SIGNIFICANT CHANGE UP (ref 70–99)

## 2019-06-18 PROCEDURE — 99232 SBSQ HOSP IP/OBS MODERATE 35: CPT

## 2019-06-18 RX ORDER — MORPHINE SULFATE 50 MG/1
1 CAPSULE, EXTENDED RELEASE ORAL ONCE
Refills: 0 | Status: DISCONTINUED | OUTPATIENT
Start: 2019-06-18 | End: 2019-06-18

## 2019-06-18 RX ORDER — MORPHINE SULFATE 50 MG/1
2 CAPSULE, EXTENDED RELEASE ORAL ONCE
Refills: 0 | Status: DISCONTINUED | OUTPATIENT
Start: 2019-06-18 | End: 2019-06-18

## 2019-06-18 RX ORDER — MORPHINE SULFATE 50 MG/1
2 CAPSULE, EXTENDED RELEASE ORAL EVERY 6 HOURS
Refills: 0 | Status: DISCONTINUED | OUTPATIENT
Start: 2019-06-18 | End: 2019-06-18

## 2019-06-18 RX ORDER — HALOPERIDOL DECANOATE 100 MG/ML
1 INJECTION INTRAMUSCULAR EVERY 6 HOURS
Refills: 0 | Status: DISCONTINUED | OUTPATIENT
Start: 2019-06-18 | End: 2019-06-18

## 2019-06-18 RX ADMIN — Medication 25 MILLIGRAM(S): at 17:45

## 2019-06-18 RX ADMIN — Medication 650 MILLIGRAM(S): at 17:45

## 2019-06-18 RX ADMIN — Medication 1 MILLIGRAM(S): at 12:54

## 2019-06-18 RX ADMIN — MORPHINE SULFATE 2 MILLIGRAM(S): 50 CAPSULE, EXTENDED RELEASE ORAL at 14:40

## 2019-06-18 RX ADMIN — Medication 650 MILLIGRAM(S): at 12:45

## 2019-06-18 RX ADMIN — SIMETHICONE 80 MILLIGRAM(S): 80 TABLET, CHEWABLE ORAL at 06:06

## 2019-06-18 RX ADMIN — Medication 300 MILLIGRAM(S): at 12:13

## 2019-06-18 RX ADMIN — Medication 250 MILLIGRAM(S): at 14:44

## 2019-06-18 RX ADMIN — SIMETHICONE 80 MILLIGRAM(S): 80 TABLET, CHEWABLE ORAL at 14:44

## 2019-06-18 RX ADMIN — Medication 650 MILLIGRAM(S): at 12:12

## 2019-06-18 RX ADMIN — Medication 250 MILLIGRAM(S): at 06:06

## 2019-06-18 RX ADMIN — Medication 1.5 MILLIGRAM(S): at 17:45

## 2019-06-18 RX ADMIN — MORPHINE SULFATE 1 MILLIGRAM(S): 50 CAPSULE, EXTENDED RELEASE ORAL at 03:50

## 2019-06-18 RX ADMIN — Medication 650 MILLIGRAM(S): at 18:15

## 2019-06-18 RX ADMIN — POLYETHYLENE GLYCOL 3350 17 GRAM(S): 17 POWDER, FOR SOLUTION ORAL at 12:26

## 2019-06-18 RX ADMIN — Medication 650 MILLIGRAM(S): at 02:34

## 2019-06-18 RX ADMIN — MORPHINE SULFATE 2 MILLIGRAM(S): 50 CAPSULE, EXTENDED RELEASE ORAL at 15:03

## 2019-06-18 RX ADMIN — HEPARIN SODIUM 5000 UNIT(S): 5000 INJECTION INTRAVENOUS; SUBCUTANEOUS at 06:07

## 2019-06-18 RX ADMIN — Medication 1 APPLICATION(S): at 12:13

## 2019-06-18 RX ADMIN — HEPARIN SODIUM 5000 UNIT(S): 5000 INJECTION INTRAVENOUS; SUBCUTANEOUS at 14:48

## 2019-06-18 RX ADMIN — Medication 1.5 MILLIGRAM(S): at 06:05

## 2019-06-18 RX ADMIN — Medication 650 MILLIGRAM(S): at 06:06

## 2019-06-18 RX ADMIN — MORPHINE SULFATE 1 MILLIGRAM(S): 50 CAPSULE, EXTENDED RELEASE ORAL at 04:35

## 2019-06-18 RX ADMIN — Medication 25 MILLIGRAM(S): at 14:44

## 2019-06-18 RX ADMIN — Medication 650 MILLIGRAM(S): at 06:41

## 2019-06-18 NOTE — PROGRESS NOTE BEHAVIORAL HEALTH - SECONDARY DX1
Delirium due to another medical condition, acute, hyperactive
Cerebrovascular accident (CVA), unspecified mechanism
Delirium due to another medical condition, acute, hyperactive
Delirium due to another medical condition, acute, hyperactive
Cerebrovascular accident (CVA), unspecified mechanism
Cerebrovascular accident (CVA), unspecified mechanism
Delirium due to another medical condition, acute, hyperactive

## 2019-06-18 NOTE — PROGRESS NOTE BEHAVIORAL HEALTH - NSBHCONSULTFOLLOWAFTERCARE_PSY_A_CORE FT
cleared for discharge; BECKY/[facility] Psych or Neuro consultant can adjust Patient's depakote and Ativan as needed

## 2019-06-18 NOTE — PROGRESS NOTE BEHAVIORAL HEALTH - PRIMARY DX
Cerebrovascular accident (CVA), unspecified mechanism
Delirium due to another medical condition, acute, hyperactive
Cerebrovascular accident (CVA), unspecified mechanism
Delirium due to another medical condition, acute, hyperactive
Delirium due to another medical condition, acute, hyperactive
Cerebrovascular accident (CVA), unspecified mechanism

## 2019-06-18 NOTE — PROGRESS NOTE BEHAVIORAL HEALTH - NSBHCONSULTOBS_PSY_A_CORE
Enhanced supervision

## 2019-06-18 NOTE — DISCHARGE NOTE NURSING/CASE MANAGEMENT/SOCIAL WORK - NSDCPEPTSTRK_GEN_ALL_CORE
Call 911 for stroke/Stroke warning signs and symptoms/Signs and symptoms of stroke/Stroke support groups for patients, families, and friends/Need for follow up after discharge/Prescribed medications/Risk factors for stroke/Stroke education booklet

## 2019-06-18 NOTE — PROGRESS NOTE ADULT - SUBJECTIVE AND OBJECTIVE BOX
VSS afeb  more comfortable  not verbal, lethargic, less agitated  Psych note appreciated  GT feeding  s/p L acute MCA infarction  d/c to Acute rehab

## 2019-06-18 NOTE — PROGRESS NOTE BEHAVIORAL HEALTH - NSBHCHARTREVIEWVS_PSY_A_CORE FT
Temp (F): 98.6 Degrees F; HR 93; /45; SpO2 (%) SpO2 (%): 97 %
Temp (F): 98.9 Degrees; HR 80; /59; RR 16 /min; SpO2 (%) SpO2 (%): 100 %
Temp (F): 98.7 Degrees F; HR 83; /44; RR 19 /min; SpO2 (%) SpO2 (%): 98 %; O2 delivery Patient On: room air
Temp (F): 97.2 Degrees F; HR 66; /66; RR 18; SpO2 (%) SpO2 (%): 96 %
Temp (F): 97.3 Degrees F; HR 72; /64; RR 18; SpO2 (%) SpO2 (%): 97 %
Temp (F): 97.8 Degrees F; HR 73; 161/69; RR 19;  SpO2 (%) SpO2 (%): 95 %
Temp (F): 98.3 Degrees F; HR 71; /63; RR  18 /min; SpO2 (%) SpO2 (%): 97 %
Temp (F): 98.5 Degrees F; HR 68; /47; RR 18; SpO2 (%) SpO2 (%): 97 %
Temp (F): 98.9 Degrees F; HR 87; /69; RR 17 /min; SpO2 (%) SpO2 (%): 98 %
Temp (F): 99 Degrees F; HR 72; /58; RR 18 /min; SpO2 (%) SpO2 (%): 97 %
Heart Rate Heart Rate (beats/min): 73 /min; /45; RR 18 /min; SpO2 (%) SpO2 (%): 100 %

## 2019-06-18 NOTE — PROGRESS NOTE BEHAVIORAL HEALTH - NS ED BHA MSE GENERAL APPEARANCE
Well developed/Other
Other/Well developed
Well developed/Other
Other/Well developed
Well developed/Other
Other/Well developed
Well developed/Other
Well developed/Other
Other/Well developed

## 2019-06-18 NOTE — DISCHARGE NOTE NURSING/CASE MANAGEMENT/SOCIAL WORK - NSDCDPATPORTLINK_GEN_ALL_CORE
You can access the [facility], offered by [facility], by registering with the following website: [email or web address]

## 2019-06-18 NOTE — PROGRESS NOTE BEHAVIORAL HEALTH - NSBHCONSULTOBSREASON_PSY_A_CORE FT
fall risk, confusion

## 2019-06-18 NOTE — PROGRESS NOTE BEHAVIORAL HEALTH - NSBHPTASSESSDT_PSY_A_CORE
10-Davie-2019
11-Jun-2019
12-Jun-2019
18-Jun-2019
05-Jun-2019
04-Jun-2019
17-Jun-2019
13-Jun-2019
14-Jun-2019
15-Davie-2019
07-Jun-2019
06-Jun-2019

## 2019-06-18 NOTE — GOALS OF CARE CONVERSATION - PERSONAL ADVANCE DIRECTIVE - CONVERSATION DETAILS
discussed in details about palliative option after rehab or during rehab if mom is not doing well. Pt is agreeable to that but would like to take a day at a time and decide that after she goes to rehab.   DNR and DNI for now

## 2019-06-18 NOTE — PROGRESS NOTE BEHAVIORAL HEALTH - SUMMARY
RECOMMENDATIONS  - trying to cross-taper Ativan / benzos to Depakote monotherapy in order to simplify Pt's regimen. Started increasing Depakene 250mg BID to TID and reducing Ativan 1.5mg PO tid to bid. HOLDING subsequent adjustments at this time and awaiting xray read, work up.

## 2019-06-18 NOTE — PROGRESS NOTE ADULT - REASON FOR ADMISSION
CVA

## 2019-06-18 NOTE — PROGRESS NOTE BEHAVIORAL HEALTH - NSBHFUPVIOL_PSY_A_CORE
none known
none known
yes
yes
none known

## 2019-06-18 NOTE — PROGRESS NOTE BEHAVIORAL HEALTH - ORIENTATION OTHER
unable to tolerate an MMSE at this time

## 2019-06-18 NOTE — PROGRESS NOTE BEHAVIORAL HEALTH - NSBHFUPINTERVALHXFT_PSY_A_CORE
Patient changed rooms/units. Patient was yelling/screaming for a substantial time yesterday - she is unable to verbalize needs thus making diagnosis of what bothers her a challenge. Pain medication was started and Patient calmed down, became more comfortable indicating pain could have been a factor. Xray KUB pending.

## 2019-06-18 NOTE — PROGRESS NOTE BEHAVIORAL HEALTH - NSBHFUPREASONCONS_PSY_A_CORE
agitation/med management
agitation/med management
med management
med management/agitation
med management/dementia/agitation
agitation/med management
agitation/med management/dementia
med management/agitation
agitation/med management
dementia/agitation/med management
med management/agitation
med management/delerium/agitation

## 2019-06-18 NOTE — PROGRESS NOTE BEHAVIORAL HEALTH - NSBHCONSFOLLOWNEEDS_PSY_A_CORE
no psychiatric contraindications to discharge
Patient needs further psychiatric safety assessment prior to discharge
no psychiatric contraindications to discharge
Patient needs further psychiatric safety assessment prior to discharge

## 2019-06-18 NOTE — PROGRESS NOTE ADULT - PROVIDER SPECIALTY LIST ADULT
Cardiology
Family Medicine
Gastroenterology
Neurology
Cardiology
Family Medicine
Gastroenterology

## 2019-06-18 NOTE — PROGRESS NOTE BEHAVIORAL HEALTH - NS ED BHA MED ROS MUSCULOSKELETAL
Unable to assess

## 2019-06-20 DIAGNOSIS — R13.10 DYSPHAGIA, UNSPECIFIED: ICD-10-CM

## 2019-06-20 DIAGNOSIS — Z93.3 COLOSTOMY STATUS: ICD-10-CM

## 2019-06-20 DIAGNOSIS — F05 DELIRIUM DUE TO KNOWN PHYSIOLOGICAL CONDITION: ICD-10-CM

## 2019-06-20 DIAGNOSIS — I63.9 CEREBRAL INFARCTION, UNSPECIFIED: ICD-10-CM

## 2019-06-20 DIAGNOSIS — E44.0 MODERATE PROTEIN-CALORIE MALNUTRITION: ICD-10-CM

## 2019-06-20 DIAGNOSIS — R29.719 NIHSS SCORE 19: ICD-10-CM

## 2019-06-20 DIAGNOSIS — R47.1 DYSARTHRIA AND ANARTHRIA: ICD-10-CM

## 2019-06-20 DIAGNOSIS — Z85.048 PERSONAL HISTORY OF OTHER MALIGNANT NEOPLASM OF RECTUM, RECTOSIGMOID JUNCTION, AND ANUS: ICD-10-CM

## 2019-06-20 DIAGNOSIS — Z78.1 PHYSICAL RESTRAINT STATUS: ICD-10-CM

## 2019-06-20 DIAGNOSIS — R45.1 RESTLESSNESS AND AGITATION: ICD-10-CM

## 2019-06-20 DIAGNOSIS — R74.8 ABNORMAL LEVELS OF OTHER SERUM ENZYMES: ICD-10-CM

## 2019-07-06 ENCOUNTER — OUTPATIENT (OUTPATIENT)
Dept: OUTPATIENT SERVICES | Facility: HOSPITAL | Age: 74
LOS: 1 days | Discharge: HOME | End: 2019-07-06
Payer: MEDICARE

## 2019-07-06 DIAGNOSIS — R91.8 OTHER NONSPECIFIC ABNORMAL FINDING OF LUNG FIELD: ICD-10-CM

## 2019-07-06 PROCEDURE — 71045 X-RAY EXAM CHEST 1 VIEW: CPT | Mod: 26

## 2019-08-28 ENCOUNTER — OUTPATIENT (OUTPATIENT)
Dept: OUTPATIENT SERVICES | Facility: HOSPITAL | Age: 74
LOS: 1 days | Discharge: HOME | End: 2019-08-28
Payer: MEDICARE

## 2019-08-28 ENCOUNTER — APPOINTMENT (OUTPATIENT)
Dept: ULTRASOUND IMAGING | Facility: HOSPITAL | Age: 74
End: 2019-08-28

## 2019-08-28 DIAGNOSIS — R10.31 RIGHT LOWER QUADRANT PAIN: ICD-10-CM

## 2019-08-28 PROCEDURE — 76856 US EXAM PELVIC COMPLETE: CPT | Mod: 26

## 2019-08-28 PROCEDURE — 76700 US EXAM ABDOM COMPLETE: CPT | Mod: 26

## 2019-09-04 ENCOUNTER — OUTPATIENT (OUTPATIENT)
Dept: OUTPATIENT SERVICES | Facility: HOSPITAL | Age: 74
LOS: 1 days | Discharge: HOME | End: 2019-09-04
Payer: MEDICARE

## 2019-09-04 DIAGNOSIS — M79.671 PAIN IN RIGHT FOOT: ICD-10-CM

## 2019-09-04 PROCEDURE — 73630 X-RAY EXAM OF FOOT: CPT | Mod: 26,RT

## 2019-09-09 NOTE — PROGRESS NOTE BEHAVIORAL HEALTH - NSBHLOC_PSY_A_CORE
Lethargic, arousable to verbal stimulus
Other
Other
None
Lethargic, arousable to verbal stimulus
Lethargic, arousable to verbal stimulus
Other
Lethargic, arousable to verbal stimulus

## 2019-09-25 ENCOUNTER — APPOINTMENT (OUTPATIENT)
Dept: RADIOLOGY | Facility: HOSPITAL | Age: 74
End: 2019-09-25

## 2019-09-25 ENCOUNTER — OUTPATIENT (OUTPATIENT)
Dept: OUTPATIENT SERVICES | Facility: HOSPITAL | Age: 74
LOS: 1 days | Discharge: HOME | End: 2019-09-25
Payer: MEDICARE

## 2019-09-25 DIAGNOSIS — I63.9 CEREBRAL INFARCTION, UNSPECIFIED: ICD-10-CM

## 2019-09-25 PROCEDURE — 70371 SPEECH EVALUATION COMPLEX: CPT | Mod: 26

## 2019-10-03 NOTE — ED ADULT NURSE NOTE - PAIN RATING/NUMBER SCALE (0-10): ACTIVITY
Patient:   EUGENIO MENDOZA            MRN: [id number]            FIN: [id number]              Age:   35 years     Sex:  FEMALE     :  [birth year]   Associated Diagnoses:   None   Author:   BRIANNE BRUNO     Multidisciplinary Rounds Completed  Open problems discussed  Plan of care updated with multidiciplinary team   5

## 2019-11-08 NOTE — BEHAVIORAL HEALTH ASSESSMENT NOTE - VETERAN
What Type Of Note Output Would You Prefer (Optional)?: Standard Output How Severe Are Your Spot(S)?: moderate Hpi Title: Evaluation of Skin Lesions Unable to assess

## 2020-01-22 NOTE — PROGRESS NOTE BEHAVIORAL HEALTH - NS ED BHA MED ROS GENITOURINARY
Unable to assess
Saucerization Excision Additional Text (Leave Blank If You Do Not Want): The margin was drawn around the clinically apparent lesion.  Incisions were then made along these lines, in a tangential fashion, to the appropriate tissue plane and the lesion was extirpated.
Unable to assess
Unable to assess

## 2020-05-20 NOTE — H&P ADULT - NSCORESITESY/N_GEN_A_CORE_RD
Please find out if she has any concern or symptoms. Otherwise it will consider screening and in that case, will need to wait.  Thanks.     Yes

## 2021-06-27 NOTE — PHYSICAL THERAPY INITIAL EVALUATION ADULT - CRITERIA FOR SKILLED THERAPEUTIC INTERVENTIONS
Pulmonary embolism functional limitations in following categories/impairments found/risk reduction/prevention/anticipated discharge recommendation

## 2021-07-05 NOTE — PROGRESS NOTE BEHAVIORAL HEALTH - OTHER
[facility] ON[facility] H[facility]ATOLOGY AND ONCOLOGY  [address]  [facility] [address] [phone]    Chief Complaint  Anemia (follow up) and Leukopenia, unspecified type (follow up)    She was previously seen for several hematological problems. She had abd CT scan which has shown rectal thickening and that she had Fe def anemia. Today she is returning with MRI and also other key labs    Now this is a FU visit       Component   Ref Range & Units 3 d ago 2 wk ago   WBC   3.40 - 10.80 10*3/mm3 6.33  4.56    RBC   3.77 - 5.28 10*6/mm3 4.17  4.30    Hemoglobin   12.0 - 15.9 g/dL 11.0Low   11.3Low     Hematocrit   34.0 - 46.6 % 34.1  35.5    MCV   79.0 - 97.0 fL 81.8  82.6    MCH   26.6 - 33.0 pg 26.4Low   26.3Low     MCHC   31.5 - 35.7 g/dL 32.3  31.8    RDW   12.3 - 15.4 % 13.2  13.5    RDW-SD   37.0 - 54.0 fl 39.2  36.9Low     MPV   6.0 - 12.0 fL 9.9  9.5    Platelets   140 - 450 10*3/mm3 241  220                Oncology/Hematology History    No history exists.         PAST MEDICAL HISTORY:  ALLERGIES:  No Known Allergies  CURRENT MEDICATIONS:  Outpatient Encounter Medications as of 2021   Medication Sig Dispense Refill   • ferrous sulfate (FerrouSul) 325 (65 FE) MG tablet Take 1 tablet by mouth Daily With Breakfast. (Patient taking differently: Take 325 mg by mouth Every 72 (Seventy-Two) Hours.)     • ondansetron ODT (ZOFRAN-ODT) 8 MG disintegrating tablet Place 1 tablet on the tongue Every 8 (Eight) Hours As Needed for Nausea or Vomiting. 15 tablet 0   • pantoprazole (PROTONIX) 40 MG EC tablet TAKE 1 TABLET BY MOUTH EVERY DAY 90 tablet 1   • [] dexamethasone (DECADRON) 1 MG tablet Take 1 tablet by mouth 2 (Two) Times a Day With Meals for 14 days. 28 tablet 2   • dexamethasone (DECADRON) 2 MG tablet Take 1 tablet by mouth Daily for 14 days. 14 tablet 2   • metoclopramide (REGLAN) 5 MG tablet Take 1 tablet by mouth 4 (Four) Times a Day Before Meals & at  Bedtime. 56 tablet 1     No facility-administered encounter medications on file as of 7/2/2021.     ADULT ILLNESSES:  Patient Active Problem List   Diagnosis Code   • Other constipation K59.09   • Rectal bleeding K62.5   • Other hemorrhoids K64.8   • Epigastric pain R10.13   • Gastroesophageal reflux disease with esophagitis K21.00   • Nausea R11.0   • Gastrointestinal hemorrhage K92.2   • Anemia, chronic disease D63.8   • Iron deficiency anemia D50.9   • Abnormal CT scan abdomen R93.89   • Anemia, blood loss D50.0   • Blood in the stool K92.1   • Lower abdominal pain R10.30   • Leukopenia D72.819   • Iron deficiency E61.1   • Colitis K52.9     SURGERIES:  Past Surgical History:   Procedure Laterality Date   • CAPSULE ENDOSCOPY N/A 4/16/2021    Procedure: CAPSULE ENDOSCOPY M2A;  Surgeon: Octavia Mattson MD;  Location: [facility] ENDOSCOPY;  Service: Gastroenterology;  Laterality: N/A; Normal exam.  No blood in lumen.  Capsule seen entering the colon.   • COLONOSCOPY N/A 5/22/2019    Non-bleeding internal hemorrhoids; The examination was otherwise normal; No specimens collected; Repeat colonoscopy at age 48 for screening purposes   • COLONOSCOPY N/A 3/18/2021    Dr. Hernandez-The examined portion of the ileum was normal; Erythematous mucosa in the rectum-biopsied; Non-bleeding internal hemorrhoids   • ENDOSCOPY N/A 6/17/2020    LA Grade A reflux esophagitis; Normal stomach-biopsied; Normal examined duodenum   • ENDOSCOPY N/A 3/18/2021    Dr. Hernandez-Z-line irregular-biopsied; Normal stomach-biopsied; Normal first portion of the duodenum and second portion of the duodenum   • WISDOM TOOTH EXTRACTION       HEALTH MAINTENANCE ITEMS:  Health Maintenance Due   Topic Date Due   • ANNUAL PHYSICAL  Never done   • HEPATITIS C SCREENING  Never done   • PAP SMEAR  Never done   • COVID-19 Vaccine (2 - Moderna 2-dose series) 04/03/2021       <no information>  Last Completed Colonoscopy     This patient has no relevant Health  "Maintenance data.          There is no immunization history on file for this patient.  Last Completed Mammogram     This patient has no relevant Health Maintenance data.            FAMILY HISTORY:  Family History   Problem Relation Age of Onset   • Heart defect Mother    • No Known Problems Father    • Hypertension Maternal Grandmother    • No Known Problems Maternal Grandfather    • No Known Problems Paternal Grandmother    • Prostate cancer Paternal Grandfather    • Pancreatic cancer Paternal Grandfather    • Colon cancer Neg Hx    • Colon polyps Neg Hx    • Esophageal cancer Neg Hx    • Liver cancer Neg Hx    • Stomach cancer Neg Hx    • Rectal cancer Neg Hx    • Liver disease Neg Hx      SOCIAL HISTORY:  Social History     Socioeconomic History   • Marital status:      Spouse name: Not on file   • Number of children: Not on file   • Years of education: Not on file   • Highest education level: Not on file   Tobacco Use   • Smoking status: Former Smoker     Types: Cigarettes   • Smokeless tobacco: Never Used   Vaping Use   • Vaping Use: Never used   Substance and Sexual Activity   • Alcohol use: Yes     Comment: Occasionally   • Drug use: No   • Sexual activity: Defer       REVIEW OF SYSTEMS:  Review of Systems   Constitutional: Positive for activity change.   HENT: Negative.    Eyes: Negative.    Respiratory: Negative.    Cardiovascular: Negative.    Gastrointestinal: Negative.    Endocrine: Negative.    Genitourinary: Negative.    Musculoskeletal: Positive for arthralgias.   Skin: Negative.    Allergic/Immunologic: Negative.    Neurological: Positive for weakness.   Hematological: Negative.    Psychiatric/Behavioral: Negative.        /88   Pulse 70   Temp 97.4 °F (36.3 °C)   Resp 16   Ht 170.2 cm (67\")   Wt 102 kg (225 lb 6.4 oz)   SpO2 98%   Breastfeeding No   BMI 35.30 kg/m²  Body surface area is 2.13 meters squared.  Pain Score    07/02/21 0943   PainSc:   5       Objective   Vital Signs: " "  /88   Pulse 70   Temp 97.4 °F (36.3 °C)   Resp 16   Ht 170.2 cm (67\")   Wt 102 kg (225 lb 6.4 oz)   SpO2 98%   Breastfeeding No   BMI 35.30 kg/m²  Body surface area is 2.13 meters squared.  Pain Score    07/02/21 0943   PainSc:   5     Ratna Flor reports a pain score of 5.  Given her pain assessment as noted, treatment options were discussed and the following options were decided upon as a follow-up plan to address the patient's pain: .      Patient's Body mass index is 35.3 kg/m². indicating that she is       Physical Exam  Constitutional:       Appearance: Normal appearance.   HENT:      Head: Atraumatic.      Mouth/Throat:      Mouth: Mucous membranes are dry.   Pulmonary:      Effort: Pulmonary effort is normal.   Abdominal:      Palpations: Abdomen is soft.   Musculoskeletal:         General: Normal range of motion.      Cervical back: Normal range of motion.   Skin:     General: Skin is dry.   Neurological:      General: No focal deficit present.      Mental Status: She is oriented to person, place, and time.   Psychiatric:         Mood and Affect: Mood normal.         Behavior: Behavior normal.            Result Review :S    Lab Results - Last 18 Months   Lab Units 07/02/21  0934 06/16/21  0732 05/19/21  1310 04/09/21  1121 03/24/21  1317 03/18/21  0336 03/17/21  0346 03/16/21  0246 03/15/21  1855 03/15/21  1613 03/15/21  1613   HEMOGLOBIN g/dL 11.0* 11.3* 11.1* 11.2* 10.2* 8.3* 8.5* 5.5* 4.4*  --  4.6*   HEMATOCRIT % 34.1 35.5 35.3 37.3 34.7 27.9* 28.2* 19.4* 16.5*  --  17.4*   MCV fL 81.8 82.6 81.1 76.7* 73.2* 68.7* 68.4* 63.4* 60.4*  --  60.8*   WBC 10*3/mm3 6.33 4.56 5.99 9.01 5.55 6.01 4.77 4.91 6.47  --  6.65   RDW % 13.2 13.5 19.1*  --  28.5* 25.4* 24.9* 21.9* 18.0*  --  18.0*   MPV fL 9.9 9.5 10.4 9.8 10.4 10.4 10.4 10.5 10.5  --  10.5   PLATELETS 10*3/mm3 241 220 250 386 193 352 385 426 463*  --  488*   IMM GRAN % % 0.2 0.2  --   --   --   --   --   --   --   --   --  "   NEUTROS ABS 10*3/mm3 4.73 2.70 4.17 6.84  --  4.12 2.89 2.31 4.92  --  5.00   LYMPHS ABS 10*3/mm3 1.03 1.24 1.20 1.39  --  0.90  --  1.87  --   --   --    MONOS ABS 10*3/mm3 0.49 0.50 0.56 0.58  --  0.75  --  0.59  --   --   --    EOS ABS 10*3/mm3 0.04 0.08 0.02 0.14  --  0.16 0.14 0.08 0.06   < >  --    BASOS ABS 10*3/mm3 0.03 0.03 0.02 0.04  --  0.06 0.05 0.05 0.13   < >  --    IMMATURE GRANS (ABS) 10*3/mm3 0.01 0.01  --   --   --   --   --   --   --   --   --    NRBC /100 WBC 0.0 0.0  --   --   --   --  2.0*  --   --   --   --    NEUTROPHIL % %  --   --   --   --   --   --  60.6  --  76.0  --  75.2   MONOCYTES % %  --   --   --   --   --   --  6.1  --  3.0*  --  6.9   BASOPHIL % %  --   --   --   --   --   --  1.0  --  2.0*  --   --    ATYP LYMPH % %  --   --   --   --   --   --  1.0  --   --   --   --    ANISOCYTOSIS   --   --   --   --   --   --  Slight/1+  --  Large/3+  --  Large/3+   GIANT PLT   --   --   --   --   --   --  Slight/1+  --  Slight/1+  --  Slight/1+    < > = values in this interval not displayed.       Lab Results - Last 18 Months   Lab Units 07/02/21  0934 05/19/21  1310 03/18/21  0336 03/17/21  0346 03/16/21  0246 03/15/21  1855 03/15/21  1613 04/08/20  1448   GLUCOSE mg/dL 109* 86 96 89 94 105* 94 92   SODIUM mmol/L 132* 139 145 140 141 140 138 144   POTASSIUM mmol/L 4.1 4.2 3.8 3.7 4.0 4.1 4.2 4.0   CO2 mmol/L 26.0 24.0 23.0 25.0 24.0 24.0 23.0 26.0   CHLORIDE mmol/L 101 104 111* 104 107 106 105 105   ANION GAP mmol/L 5.0 11.0 11.0 11.0 10.0 10.0 10.0 13.0   CREATININE mg/dL 0.54* 0.60 0.57 0.59 0.61 0.55* 0.55* 0.59   BUN mg/dL 13 12 8 9 9 9 10 12   BUN / CREAT RATIO  24.1 20.0 14.0 15.3 14.8 16.4 18.2 20.3   CALCIUM mg/dL 9.4 9.2 8.7 8.9 9.1 9.2 9.5 9.6   EGFR IF NONAFRICN AM mL/min/1.73  --   --  119 115 110 124 124 115   ALK PHOS U/L 55 60  --   --   --  46 48 59   TOTAL PROTEIN g/dL 6.8 7.5  --   --   --  6.9 6.9 8.2   ALT (SGPT) U/L 9 10  --   --   --  10 9 13   AST (SGOT) U/L 12 18   --   --   --  18 16 19   BILIRUBIN mg/dL 1.0 0.9  --   --   --  0.9 0.8 1.3*   ALBUMIN g/dL 4.10 4.30  --   --   --  4.30 4.40 4.50   GLOBULIN gm/dL 2.7 3.2  --   --   --  2.6 2.5 3.7       Lab Results - Last 18 Months   Lab Units 05/19/21  1310   CEA ng/mL 1.05   REFERENCE LAB REPORT  See Attached Result   See Attached Report  See Attached Report       Lab Results - Last 18 Months   Lab Units 07/02/21  0934 05/27/21  1006 05/19/21  1310 03/15/21  2030 03/15/21  1855 04/23/20  0829   IRON mcg/dL  --  37 112  --  10*  --    TIBC mcg/dL  --  483 533  --  586*  --    IRON SATURATION %  --  8* 21  --  2*  --    FERRITIN ng/mL 23.13 17.60 14.82  --  2.49*  --    TSH uIU/mL  --   --   --   --   --  1.630   FOLATE ng/mL  --   --  16.00 16.20  --   --               Assessment and Plan    Problem List Items Addressed This Visit        Other    Rectal bleeding    Overview     Added automatically from request for surgery [id number]         Anemia, chronic disease - Primary    Current Assessment & Plan     1. Her CBC has been improved  2. We will increase the dose of steroid         Relevant Medications    dexamethasone (DECADRON) 2 MG tablet    Other Relevant Orders    CBC & Differential    Comprehensive Metabolic Panel    Ferritin    Sedimentation Rate    Abnormal CT scan abdomen    Current Assessment & Plan     1. We have discussed about CT scan findings         Iron deficiency    Current Assessment & Plan     1. We will have a FU Fe panel         Colitis    Current Assessment & Plan     1. Her SX has been slowly improved             In summary  1. She has now stable HGB  2. Will increase the dose of steroid for anemia of chronic DS    Follow Up     Patient was given instructions and counseling regarding her condition or for health maintenance advice. Please see specific information pulled into the AVS if appropriate.    looks older than stated age n/a - sleeping deferred at this time unable to ascertain at this time

## 2021-07-09 NOTE — DISCHARGE NOTE ADULT - NS MD DC FALL RISK RISK
For information on Fall & Injury Prevention, visit [email or web address]

## 2021-09-25 NOTE — PROGRESS NOTE BEHAVIORAL HEALTH - NSBHFUPINTERVALHXFT_PSY_A_CORE
Called and spoke to mother, notified of +parainfluenza 3 test result. All questions answered, follow-up with pediatrician recommended and return precautions discussed. Patient is asleep in bed and does not awaken enough to be alert and engaging. Did not receive any PRNs. She looks comfortable and calm. As per staff, Patient is not eating, spits out food placed in her mouth and has a difficult time taking oral medications.

## 2021-11-04 NOTE — ASU PREOP CHECKLIST - IV STARTED
Access Code: [email or web address]  URL: [email or web address]  Date: 11/04/2021  Prepared by: Tracey Ambrocio    Exercises  Supine Lower Trunk Rotation - 2 x daily - 7 x weekly - 1 sets - 10 reps - 2-3 hold  Supine Piriformis Stretch with Foot on Ground - 2 x daily - 7 x weekly - 1 sets - 4 reps - 20 hold  Seated Hamstring Stretch - 1 x daily - 7 x weekly - 1 sets - 5 reps - 10 hold  Supine Posterior Pelvic Tilt - 1 x daily - 7 x weekly - 1 sets - 10 reps - 5 hold  Supine March - 1 x daily - 7 x weekly - 1 sets - 10 reps  Supine Hip Adduction Isometric with Ball - 1 x daily - 7 x weekly - 1 sets - 10 reps - 5 hold  Hooklying Clamshell with Resistance - 1 x daily - 7 x weekly - 1 sets - 10 reps - 5 hold  Hooklying Gluteal Sets - 1 x daily - 7 x weekly - 1 sets - 10 reps - 5 hold     yes

## 2022-01-25 ENCOUNTER — APPOINTMENT (OUTPATIENT)
Dept: CT IMAGING | Facility: CLINIC | Age: 77
End: 2022-01-25

## 2022-03-28 NOTE — PATIENT PROFILE ADULT - DO YOU FEEL LIKE HURTING YOURSELF OR OTHERS?
+Piecemeal deglutition/Uncontrolled bolus / spillover in blake-pharynx +Piecemeal deglutition/Uncontrolled bolus / spillover in blake-pharynx/Uncontrolled bolus / spillover in hypopharynx no

## 2023-08-05 NOTE — BEHAVIORAL HEALTH ASSESSMENT NOTE - NSBHREFER_PSY_A_CORE
inpatient Medical/Surgical team
Patient having swelling to the left arm from fingers to bicep, noticeable difference from other arm and arm different in color to other arm.  + pulses, denies injury or any type of wound.

## 2024-02-01 NOTE — ASU PATIENT PROFILE, ADULT - NSTOBACCOQUITATTEMPT_GEN_A_CORE_SD
Request received for 8/31/23.     Faxed notes to workers comp  at [phone].     Received fax confirmation.    
none

## 2024-02-20 NOTE — ED ADULT NURSE NOTE - NEURO ASSESSMENT
Not a duplicate. Pt needs medications per insurance sent to [facility] for a 30 day supply.           Reason for call:   [x] Refill   [] Prior Auth  [] Other:     Office: [facility]   [x] PCP/Provider - ghada  [] Specialty/Provider -     Medication: omeprazole, trazodone     Dose/Frequency: 20 mg, 50 mg/ daily     Quantity: 30 day supply     [facility]     Does the patient have enough for 3 days?   [x] Yes   [] No - Send as HP to POD     WDL

## 2024-04-16 NOTE — DISCHARGE NOTE NURSING/CASE MANAGEMENT/SOCIAL WORK - NSCORESITESY/N_GEN_A_CORE_RD
What Is The Reason For Today's Visit?: Full Body Skin Examination What Is The Reason For Today's Visit? (Being Monitored For X): the development of new lesions Yes

## 2024-06-05 NOTE — PHYSICAL THERAPY INITIAL EVALUATION ADULT - PHYSICAL ASSIST/NONPHYSICAL ASSIST: BED TO CHAIR, REHAB EVAL
67 y F presents for a telehealth visit for evaluation of chronic GERD. She has been on Pantoprazole for several years. Her last endoscopic evaluation was in 2019 with an EGD that revealed H pylori and colonoscopy that was polyp free. No H pylori eradication testing completed. She initially presented from her PCP with mild microcytic anemia with Hgb of 11.2 and MCV 78 with belching and upper abdominal cramping after meals despite Omeprazole 20mg daily. Symptoms for over a year and getting progressively worse despite low fat and low fiber diet.    Today, she mentions that she has mild difficulty breathing with eating before exercise and relates to uncontrolled GERD. This is intermittent. Avoids certain triggers that can worsen symptoms and usually her breathing is not an issue. Pantoprazole taken daily helps to relieve GERD. She needs a refill today.   She mentions continued iron deficiency anemia with "mild anemia" per her. Labs were checked in March of this year(I do not have recrods).   Patient seen today via telehealth by agreement and consent of patient. I used video conferencing during the visit. The patient encounter is appropriate and reasonable under the circumstances given the patient's particular presentation at this time. The patient has been advised of the followin) the potential risks and limitations of this mode of treatment (including but not limited to the absence of in-person examination); 2) the right to refuse telehealth services at any point without affecting the right to future care; 3) the right to receive in-person services, included immediately after this consultation if an urgent need arises; 4) information, including identifiable images or information from this telehealth consult, will only be shared in accordance with HIPPA regulations. Any and all of the patient's and/or patient's family member's questions on this issue have been answered.
set-up required/1 person assist